# Patient Record
Sex: MALE | Race: WHITE | NOT HISPANIC OR LATINO
[De-identification: names, ages, dates, MRNs, and addresses within clinical notes are randomized per-mention and may not be internally consistent; named-entity substitution may affect disease eponyms.]

---

## 2017-12-21 ENCOUNTER — APPOINTMENT (OUTPATIENT)
Dept: PULMONOLOGY | Facility: CLINIC | Age: 67
End: 2017-12-21

## 2019-10-16 ENCOUNTER — TRANSCRIPTION ENCOUNTER (OUTPATIENT)
Age: 69
End: 2019-10-16

## 2019-10-17 ENCOUNTER — OUTPATIENT (OUTPATIENT)
Dept: OUTPATIENT SERVICES | Facility: HOSPITAL | Age: 69
LOS: 1 days | End: 2019-10-17
Payer: MEDICARE

## 2019-10-17 ENCOUNTER — RESULT REVIEW (OUTPATIENT)
Age: 69
End: 2019-10-17

## 2019-10-17 VITALS
WEIGHT: 126.1 LBS | SYSTOLIC BLOOD PRESSURE: 137 MMHG | DIASTOLIC BLOOD PRESSURE: 64 MMHG | HEART RATE: 67 BPM | HEIGHT: 63.75 IN | TEMPERATURE: 98 F | OXYGEN SATURATION: 98 % | RESPIRATION RATE: 18 BRPM

## 2019-10-17 VITALS
OXYGEN SATURATION: 96 % | RESPIRATION RATE: 19 BRPM | SYSTOLIC BLOOD PRESSURE: 132 MMHG | DIASTOLIC BLOOD PRESSURE: 78 MMHG | HEART RATE: 87 BPM

## 2019-10-17 DIAGNOSIS — Z98.41 CATARACT EXTRACTION STATUS, RIGHT EYE: Chronic | ICD-10-CM

## 2019-10-17 DIAGNOSIS — H18.20 UNSPECIFIED CORNEAL EDEMA: ICD-10-CM

## 2019-10-17 DIAGNOSIS — T85.22XA DISPLACEMENT OF INTRAOCULAR LENS, INITIAL ENCOUNTER: ICD-10-CM

## 2019-10-17 DIAGNOSIS — Z90.5 ACQUIRED ABSENCE OF KIDNEY: Chronic | ICD-10-CM

## 2019-10-17 PROCEDURE — C1889: CPT

## 2019-10-17 PROCEDURE — 88300 SURGICAL PATH GROSS: CPT

## 2019-10-17 PROCEDURE — 88300 SURGICAL PATH GROSS: CPT | Mod: 26

## 2019-10-17 PROCEDURE — 66986 EXCHANGE LENS PROSTHESIS: CPT | Mod: LT

## 2019-10-17 PROCEDURE — 67039 LASER TREATMENT OF RETINA: CPT | Mod: LT

## 2019-10-17 PROCEDURE — V2632: CPT

## 2019-10-17 NOTE — ASU DISCHARGE PLAN (ADULT/PEDIATRIC) - PATIENT EDUCATION MATERIALS PROVIED
Intraocular lens implant (IOL), Gustavo gas bubble card, Eye shield instructions and eye kit given to patient/Implant card (specify)/Other (specify)

## 2019-10-17 NOTE — ASU DISCHARGE PLAN (ADULT/PEDIATRIC) - NURSING INSTRUCTIONS
Do not rub the eye. Tylenol or extra strength tylenol if needed for discomfort, avoid   Advil, Motrin, Aleve and aspirin to minimize bleeding.  Appointment with Dr. Calero on 10/18/19 @ 11:15am

## 2019-10-17 NOTE — ASU DISCHARGE PLAN (ADULT/PEDIATRIC) - PROCEDURE
left eye pars plana vitrectomy, removal of dislocated intraocular lens with new insertion of IOL scleral sutured, endolaser left eye pars plana vitrectomy, removal of dislocated intraocular lens with new insertion of IOL scleral sutured, SF6 gas,  endolaser

## 2019-10-17 NOTE — ASU DISCHARGE PLAN (ADULT/PEDIATRIC) - CARE PROVIDER_API CALL
Camilo Virgen)  Ophthalmology  [address] [place] [address], [facility] [address]  [place] [address]  Phone: [phone]  Fax: [phone]  Follow Up Time:

## 2019-10-17 NOTE — ASU DISCHARGE PLAN (ADULT/PEDIATRIC) - CALL YOUR DOCTOR IF YOU HAVE ANY OF THE FOLLOWING:
Pain not relieved by Medications/Nausea and vomiting that does not stop/Bleeding that does not stop/Fever greater than (need to indicate Fahrenheit or Celsius)/Wound/Surgical Site with redness, or foul smelling discharge or pus/Swelling that gets worse Bleeding that does not stop/Nausea and vomiting that does not stop/Swelling that gets worse/Fever greater than (need to indicate Fahrenheit or Celsius)/Pain not relieved by Medications

## 2019-12-05 PROBLEM — I10 ESSENTIAL (PRIMARY) HYPERTENSION: Chronic | Status: ACTIVE | Noted: 2019-10-17

## 2019-12-16 ENCOUNTER — APPOINTMENT (OUTPATIENT)
Dept: UROLOGY | Facility: CLINIC | Age: 69
End: 2019-12-16
Payer: MEDICARE

## 2019-12-16 VITALS
BODY MASS INDEX: 22.53 KG/M2 | WEIGHT: 132 LBS | DIASTOLIC BLOOD PRESSURE: 67 MMHG | SYSTOLIC BLOOD PRESSURE: 182 MMHG | HEART RATE: 81 BPM | RESPIRATION RATE: 16 BRPM | TEMPERATURE: 98.3 F | HEIGHT: 64 IN

## 2019-12-16 DIAGNOSIS — Z80.1 FAMILY HISTORY OF MALIGNANT NEOPLASM OF TRACHEA, BRONCHUS AND LUNG: ICD-10-CM

## 2019-12-16 DIAGNOSIS — Z83.3 FAMILY HISTORY OF DIABETES MELLITUS: ICD-10-CM

## 2019-12-16 DIAGNOSIS — Z72.0 TOBACCO USE: ICD-10-CM

## 2019-12-16 PROCEDURE — 99204 OFFICE O/P NEW MOD 45 MIN: CPT

## 2019-12-16 RX ORDER — AMLODIPINE BESYLATE 5 MG/1
5 TABLET ORAL
Refills: 0 | Status: ACTIVE | COMMUNITY

## 2019-12-16 NOTE — ASSESSMENT
[FreeTextEntry1] : \par \par Impression/plan: 70 yo gentleman with PMH bladder cancer s/p right NU, followed by recent TURBT with left stent placement for hydroureteronephrosis. The patient had a UDS study which demonstrated reduced bladder capacity with elevated voiding pressures up to 100 cm H2O, c/w MARIANO. He is here today for a second opinion regarding UDS study and discuss management options.\par \par 1. Hydro likely secondary to MARIANO. To confirm, can have MAg-3 with stent out to ensure upper tract not obstructed. If so, can have MARIANO procedure. Will confer with Dr. Smith re: outlet procedure.

## 2019-12-16 NOTE — HISTORY OF PRESENT ILLNESS
[FreeTextEntry1] : 70 yo gentleman with PMH bladder cancer s/p right NU, followed by recent TURBT with left stent placement for hydroureteronephrosis. The patient had a UDS study which demonstrated reduced bladder capacity with elevated voiding pressures up to 100 cm H2O, c/w MARIANO. He is here today for a second opinion regarding UDS study and discuss management options. He is currently on finasteride, no sig obstructive voiding symptoms. Creat 1.9, which is stable since NU 2 years ago.

## 2019-12-16 NOTE — REVIEW OF SYSTEMS
[Negative] : Heme/Lymph [Blood in urine that you can see] : blood visible in urine [Told you have blood in urine on a urine test] : told blood was present in a urine test [Discharge from urine canal] : discharge from urine canal [Wake up at night to urinate  How many times?  ___] : wakes up to urinate [unfilled] times during the night [Leakage of urine with straining, coughing, laughing] : leakage of urine with straining, coughing, laughing [FreeTextEntry3] : frequent urination

## 2019-12-16 NOTE — PHYSICAL EXAM
[General Appearance - Well Nourished] : well nourished [General Appearance - Well Developed] : well developed [Normal Appearance] : normal appearance [Well Groomed] : well groomed [General Appearance - In No Acute Distress] : no acute distress [Edema] : no peripheral edema [Respiration, Rhythm And Depth] : normal respiratory rhythm and effort [Exaggerated Use Of Accessory Muscles For Inspiration] : no accessory muscle use [Abdomen Soft] : soft [Abdomen Tenderness] : non-tender [Normal Station and Gait] : the gait and station were normal for the patient's age [Costovertebral Angle Tenderness] : no ~M costovertebral angle tenderness [No Focal Deficits] : no focal deficits [] : no rash [Oriented To Time, Place, And Person] : oriented to person, place, and time

## 2020-01-23 ENCOUNTER — APPOINTMENT (OUTPATIENT)
Dept: UROLOGY | Facility: CLINIC | Age: 70
End: 2020-01-23
Payer: MEDICARE

## 2020-01-23 VITALS
DIASTOLIC BLOOD PRESSURE: 69 MMHG | HEART RATE: 76 BPM | HEIGHT: 64 IN | RESPIRATION RATE: 16 BRPM | SYSTOLIC BLOOD PRESSURE: 175 MMHG | BODY MASS INDEX: 22.53 KG/M2 | TEMPERATURE: 98.7 F | WEIGHT: 132 LBS

## 2020-01-23 PROCEDURE — 99213 OFFICE O/P EST LOW 20 MIN: CPT

## 2020-01-23 NOTE — PHYSICAL EXAM
[General Appearance - Well Developed] : well developed [General Appearance - Well Nourished] : well nourished [Normal Appearance] : normal appearance [Well Groomed] : well groomed [General Appearance - In No Acute Distress] : no acute distress [Edema] : no peripheral edema [Respiration, Rhythm And Depth] : normal respiratory rhythm and effort [] : no respiratory distress [Exaggerated Use Of Accessory Muscles For Inspiration] : no accessory muscle use [Oriented To Time, Place, And Person] : oriented to person, place, and time [Normal Station and Gait] : the gait and station were normal for the patient's age

## 2020-01-23 NOTE — ASSESSMENT
[FreeTextEntry1] : \par \par Impression/plan: 68 yo gentleman with PMH bladder cancer s/p right NU, followed by recent TURBT with left stent placement for hydroureteronephrosis (only retrograde performed). The patient had a UDS study which demonstrated reduced bladder capacity with elevated voiding pressures up to 100 cm H2O, c/w MARIANO. He is currently on finasteride 5 mg daily. Creat 1.9, which is stable since NU 2 years ago. The plan was for stent removal, but after discussion with Dr. Smith, decision is made for outlet procedure, URS to evaluate ureter and stent removal followed by nuclear study to r/o ureteral obstruction. \par \par 1. Referral to Dr. Smith for discussion of outlet procedure, left URS and eval for ureteral obstruction.

## 2020-01-23 NOTE — HISTORY OF PRESENT ILLNESS
[FreeTextEntry1] : 68 yo gentleman with PMH bladder cancer s/p right NU, followed by recent TURBT with left stent placement for hydroureteronephrosis (only retrograde performed). The patient had a UDS study which demonstrated reduced bladder capacity with elevated voiding pressures up to 100 cm H2O, c/w MARIANO. He is currently on finasteride 5 mg daily. Creat 1.9, which is stable since NU 2 years ago. The plan was for stent removal, but after discussion with Dr. Smith, decision is made for outlet procedure, URS to evaluate ureter and stent removal followed by nuclear study to r/o ureteral obstruction. \par

## 2020-02-03 ENCOUNTER — APPOINTMENT (OUTPATIENT)
Dept: UROLOGY | Facility: CLINIC | Age: 70
End: 2020-02-03
Payer: MEDICARE

## 2020-02-03 PROCEDURE — 99214 OFFICE O/P EST MOD 30 MIN: CPT

## 2020-02-03 NOTE — PHYSICAL EXAM
[General Appearance - Well Developed] : well developed [General Appearance - Well Nourished] : well nourished [Normal Appearance] : normal appearance [Well Groomed] : well groomed [General Appearance - In No Acute Distress] : no acute distress [Edema] : no peripheral edema [Exaggerated Use Of Accessory Muscles For Inspiration] : no accessory muscle use [Respiration, Rhythm And Depth] : normal respiratory rhythm and effort [Abdomen Soft] : soft [Abdomen Tenderness] : non-tender [Costovertebral Angle Tenderness] : no ~M costovertebral angle tenderness [Urethral Meatus] : meatus normal [Testes Mass (___cm)] : there were no testicular masses [Scrotum] : the scrotum was normal [Urinary Bladder Findings] : the bladder was normal on palpation [No Prostate Nodules] : no prostate nodules [Normal Station and Gait] : the gait and station were normal for the patient's age [No Focal Deficits] : no focal deficits [] : no rash [Affect] : the affect was normal [Oriented To Time, Place, And Person] : oriented to person, place, and time [Not Anxious] : not anxious [Mood] : the mood was normal [No Palpable Adenopathy] : no palpable adenopathy

## 2020-02-03 NOTE — ADDENDUM
[FreeTextEntry1] : Entered by William Conde, acting as scribe for Dr. Paul Smith.\par \par The documentation recorded by the scribe accurately reflects the service I personally performed and the decisions made by me.

## 2020-02-03 NOTE — LETTER BODY
[FreeTextEntry1] : Kalin TOSCANO. Leventhal DO\par [address]\[place] [address]\par [phone]\par \par Dear Dr. Leventhal,\par \par Reason for Visit: BPH. Bladder cancer. Previous kidney cancer s/p right radical nephrectomy. Left hydronephrosis s/p stent placement.\par \par This is a 69 year-old gentleman with history of kidney cancer presenting with symptoms of BPH, bladder cancer, and left hydronephrosis s/p stent placement. Patient is here today for evaluation, accompanied by his son. Patient reports previous kidney cancer s/p right radical nephrectomy at [facility] and left hydronephrosis s/p  stent placement. He reports that he was told to replace his stent every 3 months. His recent urodynamics testing with Dr. Kumari demonstrated reduced bladder capacity with elevated voiding pressures consistent with bladder outlet obstruction. He reports previous bcg instillations for his bladder cancer. He denies any hematuria or urinary incontinence. He denies any alleviating factors. He is currently taking Flomax and reports strong uroflow. He reports no pain. All other review of systems are negative. He has no cancer in his family medical history. He has previous surgical history. Past medical history, family history and social history were inquired and were noncontributory to current condition. Patient currently smokes. I encourage the patient to stop smoking and seek smoking cessation programs. I discuss with him to potential long term complications and health effects from smoking. I gave the patient additional information including the [facility] Refer-to-Quit program. I spent over 3 minutes counseling the patient regarding smoking cessation. Medications and allergies were reviewed. He has no known allergies to medication. \par \par On examination, the patient is a healthy-appearing gentleman in no acute distress. He is alert and oriented follows commands. He has normal mood and affect. He is normocephalic. Neck is supple. Oral no thrush Respirations are unlabored. His abdomen is soft and nontender. Bladder is nonpalpable. No CVA tenderness. Neurologically he is grossly intact. No peripheral edema. Skin without gross abnormality. He has normal male external genitalia. Normal meatus. Bilateral testes are descended intrascrotally and normal to palpation. On rectal examination, there is normal sphincter tone. The prostate is clinically benign without focal induration or nodularity.\par \par His PSA is 0.98, within normal limits. His creatinine level is 1.9, which is stable.\par \par ASSESSMENT: BPH. Bladder cancer. Previous kidney cancer s/p right radical nephrectomy. Left hydronephrosis s/p stent placement.\par \par I counseled the patient on the various etiology of his symptoms. I discussed the natural history of BPH and the treatment options available. I discussed the options of conservative management with fluid in dietary restrictions, herbal therapy, medical therapy, and minimally invasive procedures.  Risk and benefits were discussed. I answered his questions. Given his bladder outlet obstruction, I recommend he continue taking his prostate medications and proceed with greenlight laser vaporization of prostate. I counseled the patient regarding the procedure. The risks and benefits were discussed. Alternatives were given. I answered the patient questions. In terms of his left hydronephrosis, I recommend he undergo left ureteroscopy and stent replacement.  I counseled the patient regarding the procedure. The risks and benefits were discussed. Alternatives were given. I answered the patient questions. The patient will take the necessary preparations for the procedures, including activated partial thromboplastin time, BMP, CBC w/ DIFF, culture, prothrombin Time and INR. I also asked that he send me his pathology report from [facility]. The patient will follow-up as directed and will contact me with any questions or concerns. Thank you for the opportunity to participate in the care of Mr. AHMADI. I will keep you updated on his progress.\par \par Plan: Left ureteroscopy and stent placement. Greenlight laser vaporization of prostate. Activated partial thromboplastin time. BMP. CBC w/ DIFF. Culture. Prothrombin Time and INR. Obtain pathology report from [facility]. Follow up as directed.

## 2020-02-04 LAB
ANION GAP SERPL CALC-SCNC: 14 MMOL/L
APPEARANCE: CLEAR
APTT BLD: 29.1 SEC
BACTERIA: NEGATIVE
BASOPHILS # BLD AUTO: 0.05 K/UL
BASOPHILS NFR BLD AUTO: 0.6 %
BILIRUBIN URINE: NEGATIVE
BLOOD URINE: ABNORMAL
BUN SERPL-MCNC: 28 MG/DL
CALCIUM SERPL-MCNC: 8.8 MG/DL
CHLORIDE SERPL-SCNC: 101 MMOL/L
CO2 SERPL-SCNC: 25 MMOL/L
COLOR: NORMAL
CREAT SERPL-MCNC: 1.98 MG/DL
EOSINOPHIL # BLD AUTO: 0.25 K/UL
EOSINOPHIL NFR BLD AUTO: 3 %
GLUCOSE QUALITATIVE U: NEGATIVE
GLUCOSE SERPL-MCNC: 90 MG/DL
HCT VFR BLD CALC: 40.4 %
HGB BLD-MCNC: 12.4 G/DL
HYALINE CASTS: 0 /LPF
IMM GRANULOCYTES NFR BLD AUTO: 0.1 %
INR PPP: 0.97 RATIO
KETONES URINE: NEGATIVE
LEUKOCYTE ESTERASE URINE: NEGATIVE
LYMPHOCYTES # BLD AUTO: 1.81 K/UL
LYMPHOCYTES NFR BLD AUTO: 21.6 %
MAN DIFF?: NORMAL
MCHC RBC-ENTMCNC: 28.9 PG
MCHC RBC-ENTMCNC: 30.7 GM/DL
MCV RBC AUTO: 94.2 FL
MICROSCOPIC-UA: NORMAL
MONOCYTES # BLD AUTO: 0.77 K/UL
MONOCYTES NFR BLD AUTO: 9.2 %
NEUTROPHILS # BLD AUTO: 5.48 K/UL
NEUTROPHILS NFR BLD AUTO: 65.5 %
NITRITE URINE: NEGATIVE
PH URINE: 6
PLATELET # BLD AUTO: 294 K/UL
POTASSIUM SERPL-SCNC: 5 MMOL/L
PROTEIN URINE: NORMAL
PSA FREE FLD-MCNC: 23 %
PSA FREE SERPL-MCNC: 0.23 NG/ML
PSA SERPL-MCNC: 1.01 NG/ML
PT BLD: 10.9 SEC
RBC # BLD: 4.29 M/UL
RBC # FLD: 13.2 %
RED BLOOD CELLS URINE: 23 /HPF
SODIUM SERPL-SCNC: 140 MMOL/L
SPECIFIC GRAVITY URINE: 1.01
SQUAMOUS EPITHELIAL CELLS: 0 /HPF
UROBILINOGEN URINE: NORMAL
WBC # FLD AUTO: 8.37 K/UL
WHITE BLOOD CELLS URINE: 2 /HPF

## 2020-02-05 LAB — BACTERIA UR CULT: NORMAL

## 2020-02-19 ENCOUNTER — OUTPATIENT (OUTPATIENT)
Dept: OUTPATIENT SERVICES | Facility: HOSPITAL | Age: 70
LOS: 1 days | End: 2020-02-19
Payer: MEDICARE

## 2020-02-19 VITALS
HEART RATE: 87 BPM | TEMPERATURE: 98 F | OXYGEN SATURATION: 97 % | DIASTOLIC BLOOD PRESSURE: 79 MMHG | SYSTOLIC BLOOD PRESSURE: 155 MMHG | RESPIRATION RATE: 15 BRPM | HEIGHT: 63 IN | WEIGHT: 128.09 LBS

## 2020-02-19 DIAGNOSIS — N40.1 BENIGN PROSTATIC HYPERPLASIA WITH LOWER URINARY TRACT SYMPTOMS: ICD-10-CM

## 2020-02-19 DIAGNOSIS — Z01.818 ENCOUNTER FOR OTHER PREPROCEDURAL EXAMINATION: ICD-10-CM

## 2020-02-19 DIAGNOSIS — N13.30 UNSPECIFIED HYDRONEPHROSIS: ICD-10-CM

## 2020-02-19 DIAGNOSIS — C67.9 MALIGNANT NEOPLASM OF BLADDER, UNSPECIFIED: ICD-10-CM

## 2020-02-19 DIAGNOSIS — Z98.41 CATARACT EXTRACTION STATUS, RIGHT EYE: Chronic | ICD-10-CM

## 2020-02-19 DIAGNOSIS — Z90.5 ACQUIRED ABSENCE OF KIDNEY: Chronic | ICD-10-CM

## 2020-02-19 DIAGNOSIS — Z29.9 ENCOUNTER FOR PROPHYLACTIC MEASURES, UNSPECIFIED: ICD-10-CM

## 2020-02-19 LAB
BLD GP AB SCN SERPL QL: NEGATIVE — SIGNIFICANT CHANGE UP
RH IG SCN BLD-IMP: POSITIVE — SIGNIFICANT CHANGE UP

## 2020-02-19 RX ORDER — CEFAZOLIN SODIUM 1 G
2000 VIAL (EA) INJECTION ONCE
Refills: 0 | Status: DISCONTINUED | OUTPATIENT
Start: 2020-02-26 | End: 2020-03-12

## 2020-02-19 NOTE — H&P PST ADULT - EYES COMMENTS
Left upper lid drooping over eye, cloudy film to eye, unable to assess pupil reaction to light, R pupil reactive

## 2020-02-19 NOTE — H&P PST ADULT - NEUROLOGICAL DETAILS
alert and oriented x 3/sensation intact/responds to verbal commands/normal strength/responds to pain

## 2020-02-19 NOTE — H&P PST ADULT - ATTENDING COMMENTS
70 year old gentleman with BPH and bladder cancer with persistent urinary symptoms and left hydronephrosis despite medical therapy. Patient wishes to proceed with Greenlight laser vaporization of prostate possible transurethral resection of prostate and left ureteroscopy/stent placement  to treat his condition. Alternatives were given. Risks including but not limited to bleeding, need for blood transfusion, infection, risk of anesthesia, pain, failure to cure, persistent urinary symptoms, bladder neck contractures, erectile dysfunction, urinary incontinence, need for future procedure, and injury to surrounding structures were discussed. Patient wishes to proceed with procedure.

## 2020-02-19 NOTE — H&P PST ADULT - NSICDXPROBLEM_GEN_ALL_CORE_FT
PROBLEM DIAGNOSES  Problem: Enlarged prostate with lower urinary tract symptoms (LUTS)  Assessment and Plan: Scheduled for Greenlight Laser Vaporization of Prostate Left ureteroscopy  Preop instructions given.  Labs, incl urine cx and medical eval in chart.  T&S drawn and sent to lab  Recommend nicotine patch  Smoking cessation education    Problem: Prophylactic measure  Assessment and Plan: The Caprini score indicates this patient is at risk for a VTE event (score 3-5).  Most surgical patients in this group would benefit from pharmacologic prophylaxis.  The surgical team will determine the balance between VTE risk and bleeding risk

## 2020-02-19 NOTE — H&P PST ADULT - OPHTHALMOLOGIC COMMENTS
Outer membrane of left eye needs to be operated on, s/p 2 cataract surgeries, left eye droop upper lid, cloudy vision in left eye

## 2020-02-19 NOTE — H&P PST ADULT - HISTORY OF PRESENT ILLNESS
Mr. Delgado is a 70 year old man with PMH HTN, everyday smoker, prediabetes, bladder cancer s/p multiple surgeries, s/p right nephrectomy and enlarged prostate with LUTS now scheduled for Greenlight Vaporization of prostate left ureteroscopy.

## 2020-02-19 NOTE — H&P PST ADULT - NSANTHOSAYNRD_GEN_A_CORE
No. MILAN screening performed.  STOP BANG Legend: 0-2 = LOW Risk; 3-4 = INTERMEDIATE Risk; 5-8 = HIGH Risk

## 2020-02-19 NOTE — H&P PST ADULT - NEGATIVE ALLERGY TYPES
no reactions to medicines/no reactions to animals/no reactions to food/no outdoor environmental allergies/no indoor environmental allergies

## 2020-02-19 NOTE — H&P PST ADULT - NSICDXPASTMEDICALHX_GEN_ALL_CORE_FT
PAST MEDICAL HISTORY:  Bladder cancer     Current every day smoker     Enlarged prostate     HTN (hypertension)     Prediabetes     Ureter cancer, right

## 2020-02-19 NOTE — H&P PST ADULT - ASSESSMENT
CAPRINI SCORE [CLOT]    AGE RELATED RISK FACTORS                                                       MOBILITY RELATED FACTORS  [ ] Age 41-60 years                                            (1 Point)                  [ ] Bed rest                                                        (1 Point)  [x ] Age: 61-74 years                                           (2 Points)                 [ ] Plaster cast                                                   (2 Points)  [ ] Age= 75 years                                              (3 Points)                 [ ] Bed bound for more than 72 hours                 (2 Points)    DISEASE RELATED RISK FACTORS                                               GENDER SPECIFIC FACTORS  [ ] Edema in the lower extremities                       (1 Point)                  [ ] Pregnancy                                                     (1 Point)  [ ] Varicose veins                                               (1 Point)                  [ ] Post-partum < 6 weeks                                   (1 Point)             [ ] BMI > 25 Kg/m2                                            (1 Point)                  [ ] Hormonal therapy  or oral contraception          (1 Point)                 [ ] Sepsis (in the previous month)                        (1 Point)                  [ ] History of pregnancy complications                 (1 point)  [ ] Pneumonia or serious lung disease                                               [ ] Unexplained or recurrent                     (1 Point)           (in the previous month)                               (1 Point)  [ ] Abnormal pulmonary function test                     (1 Point)                 SURGERY RELATED RISK FACTORS  [ ] Acute myocardial infarction                              (1 Point)                 [ ]  Section                                             (1 Point)  [ ] Congestive heart failure (in the previous month)  (1 Point)               [ ] Minor surgery                                                  (1 Point)   [ ] Inflammatory bowel disease                             (1 Point)                 [ ] Arthroscopic surgery                                        (2 Points)  [ ] Central venous access                                      (2 Points)                [ x] General surgery lasting more than 45 minutes   (2 Points)       [ ] Stroke (in the previous month)                          (5 Points)               [ ] Elective arthroplasty                                         (5 Points)                                                                                                                                               HEMATOLOGY RELATED FACTORS                                                 TRAUMA RELATED RISK FACTORS  [ ] Prior episodes of VTE                                     (3 Points)                 [ ] Fracture of the hip, pelvis, or leg                       (5 Points)  [ ] Positive family history for VTE                         (3 Points)                 [ ] Acute spinal cord injury (in the previous month)  (5 Points)  [ ] Prothrombin 50466 A                                     (3 Points)                 [ ] Paralysis  (less than 1 month)                             (5 Points)  [ ] Factor V Leiden                                             (3 Points)                  [ ] Multiple Trauma within 1 month                        (5 Points)  [ ] Lupus anticoagulants                                     (3 Points)                                                           [ ] Anticardiolipin antibodies                               (3 Points)                                                       [ ] High homocysteine in the blood                      (3 Points)                                             [ ] Other congenital or acquired thrombophilia      (3 Points)                                                [ ] Heparin induced thrombocytopenia                  (3 Points)                                          Total Score [   4       ]

## 2020-02-19 NOTE — H&P PST ADULT - RS GEN PE MLT RESP DETAILS PC
breath sounds equal/no rhonchi/no wheezes/clear to auscultation bilaterally/respirations non-labored/good air movement/airway patent

## 2020-02-25 ENCOUNTER — TRANSCRIPTION ENCOUNTER (OUTPATIENT)
Age: 70
End: 2020-02-25

## 2020-02-26 ENCOUNTER — RESULT REVIEW (OUTPATIENT)
Age: 70
End: 2020-02-26

## 2020-02-26 ENCOUNTER — OUTPATIENT (OUTPATIENT)
Dept: OUTPATIENT SERVICES | Facility: HOSPITAL | Age: 70
LOS: 1 days | End: 2020-02-26
Payer: MEDICARE

## 2020-02-26 ENCOUNTER — APPOINTMENT (OUTPATIENT)
Dept: UROLOGY | Facility: HOSPITAL | Age: 70
End: 2020-02-26

## 2020-02-26 VITALS
DIASTOLIC BLOOD PRESSURE: 65 MMHG | RESPIRATION RATE: 16 BRPM | HEIGHT: 63 IN | SYSTOLIC BLOOD PRESSURE: 148 MMHG | WEIGHT: 128.09 LBS | HEART RATE: 74 BPM | OXYGEN SATURATION: 97 % | TEMPERATURE: 98 F

## 2020-02-26 DIAGNOSIS — N13.30 UNSPECIFIED HYDRONEPHROSIS: ICD-10-CM

## 2020-02-26 DIAGNOSIS — C67.9 MALIGNANT NEOPLASM OF BLADDER, UNSPECIFIED: ICD-10-CM

## 2020-02-26 DIAGNOSIS — Z90.5 ACQUIRED ABSENCE OF KIDNEY: Chronic | ICD-10-CM

## 2020-02-26 DIAGNOSIS — N40.1 BENIGN PROSTATIC HYPERPLASIA WITH LOWER URINARY TRACT SYMPTOMS: ICD-10-CM

## 2020-02-26 DIAGNOSIS — Z98.41 CATARACT EXTRACTION STATUS, RIGHT EYE: Chronic | ICD-10-CM

## 2020-02-26 LAB — RH IG SCN BLD-IMP: POSITIVE — SIGNIFICANT CHANGE UP

## 2020-02-26 PROCEDURE — 76000 FLUOROSCOPY <1 HR PHYS/QHP: CPT

## 2020-02-26 PROCEDURE — 52648 LASER SURGERY OF PROSTATE: CPT

## 2020-02-26 PROCEDURE — 86850 RBC ANTIBODY SCREEN: CPT

## 2020-02-26 PROCEDURE — G0463: CPT

## 2020-02-26 PROCEDURE — 86900 BLOOD TYPING SEROLOGIC ABO: CPT

## 2020-02-26 PROCEDURE — 88112 CYTOPATH CELL ENHANCE TECH: CPT | Mod: 26

## 2020-02-26 PROCEDURE — 52351 CYSTOURETERO & OR PYELOSCOPE: CPT

## 2020-02-26 PROCEDURE — 74420 UROGRAPHY RTRGR +-KUB: CPT | Mod: 26

## 2020-02-26 PROCEDURE — 88305 TISSUE EXAM BY PATHOLOGIST: CPT | Mod: 26

## 2020-02-26 PROCEDURE — 86901 BLOOD TYPING SEROLOGIC RH(D): CPT

## 2020-02-26 RX ORDER — LIDOCAINE HCL 20 MG/ML
0.2 VIAL (ML) INJECTION ONCE
Refills: 0 | Status: DISCONTINUED | OUTPATIENT
Start: 2020-02-26 | End: 2020-02-26

## 2020-02-26 RX ORDER — SODIUM CHLORIDE 9 MG/ML
1000 INJECTION, SOLUTION INTRAVENOUS
Refills: 0 | Status: DISCONTINUED | OUTPATIENT
Start: 2020-02-26 | End: 2020-03-12

## 2020-02-26 RX ORDER — CEPHALEXIN 500 MG
1 CAPSULE ORAL
Qty: 6 | Refills: 0
Start: 2020-02-26 | End: 2020-02-28

## 2020-02-26 RX ORDER — ONDANSETRON 8 MG/1
4 TABLET, FILM COATED ORAL ONCE
Refills: 0 | Status: DISCONTINUED | OUTPATIENT
Start: 2020-02-26 | End: 2020-02-27

## 2020-02-26 RX ORDER — SODIUM CHLORIDE 9 MG/ML
3 INJECTION INTRAMUSCULAR; INTRAVENOUS; SUBCUTANEOUS EVERY 8 HOURS
Refills: 0 | Status: DISCONTINUED | OUTPATIENT
Start: 2020-02-26 | End: 2020-02-26

## 2020-02-26 RX ORDER — HYDROMORPHONE HYDROCHLORIDE 2 MG/ML
0.25 INJECTION INTRAMUSCULAR; INTRAVENOUS; SUBCUTANEOUS
Refills: 0 | Status: DISCONTINUED | OUTPATIENT
Start: 2020-02-26 | End: 2020-02-27

## 2020-02-26 NOTE — ASU DISCHARGE PLAN (ADULT/PEDIATRIC) - CARE PROVIDER_API CALL
Paul Smith)  Urology  [address] [place] [address], [place] [address]  Phone: [phone]  Fax: [phone]  Follow Up Time:

## 2020-02-26 NOTE — BRIEF OPERATIVE NOTE - NSICDXBRIEFPOSTOP_GEN_ALL_CORE_FT
POST-OP DIAGNOSIS:  Hydronephrosis, left 26-Feb-2020 16:46:00  Juan Carlos Rolon  BPH with urinary obstruction 26-Feb-2020 16:45:39  Juan Carlos Rolon

## 2020-02-26 NOTE — BRIEF OPERATIVE NOTE - NSICDXBRIEFPREOP_GEN_ALL_CORE_FT
PRE-OP DIAGNOSIS:  Hydronephrosis, left 26-Feb-2020 16:45:48  Juan Carlos Rolon  BPH with urinary obstruction 26-Feb-2020 16:45:29  Juan Carlos Rolon

## 2020-02-26 NOTE — BRIEF OPERATIVE NOTE - NSICDXBRIEFPROCEDURE_GEN_ALL_CORE_FT
PROCEDURES:  Left rigid ureteroscopy 26-Feb-2020 16:45:08 left ureteral stent exchange Juan Carlos Rolon  Photoselective vaporization of prostate (PVP) with GreenLight laser 26-Feb-2020 16:44:05  Juan Carlos Rolon

## 2020-02-26 NOTE — ASU DISCHARGE PLAN (ADULT/PEDIATRIC) - ASU DC SPECIAL INSTRUCTIONSFT
You may take Tylenol and Ibuprofen over the counter every 6 hours for pain.     You will be discharged with the reeves in place. Please follow up with Dr Smith tomorrow for catheter removal. Please call the office to schedule your appointment.

## 2020-02-27 ENCOUNTER — APPOINTMENT (OUTPATIENT)
Dept: UROLOGY | Facility: CLINIC | Age: 70
End: 2020-02-27

## 2020-02-27 VITALS
DIASTOLIC BLOOD PRESSURE: 79 MMHG | SYSTOLIC BLOOD PRESSURE: 167 MMHG | OXYGEN SATURATION: 99 % | TEMPERATURE: 98 F | HEART RATE: 92 BPM | RESPIRATION RATE: 18 BRPM

## 2020-02-27 PROBLEM — F17.200 NICOTINE DEPENDENCE, UNSPECIFIED, UNCOMPLICATED: Chronic | Status: ACTIVE | Noted: 2020-02-19

## 2020-02-27 PROCEDURE — C1758: CPT

## 2020-02-27 PROCEDURE — C1889: CPT

## 2020-02-27 PROCEDURE — 76000 FLUOROSCOPY <1 HR PHYS/QHP: CPT

## 2020-02-27 PROCEDURE — 86900 BLOOD TYPING SEROLOGIC ABO: CPT

## 2020-02-27 PROCEDURE — C2617: CPT

## 2020-02-27 PROCEDURE — 88112 CYTOPATH CELL ENHANCE TECH: CPT

## 2020-02-27 PROCEDURE — 88305 TISSUE EXAM BY PATHOLOGIST: CPT

## 2020-02-27 PROCEDURE — C1769: CPT

## 2020-02-27 PROCEDURE — 86901 BLOOD TYPING SEROLOGIC RH(D): CPT

## 2020-02-27 PROCEDURE — C1887: CPT

## 2020-02-27 PROCEDURE — 52648 LASER SURGERY OF PROSTATE: CPT

## 2020-02-27 PROCEDURE — 52332 CYSTOSCOPY AND TREATMENT: CPT | Mod: LT

## 2020-02-27 RX ORDER — ACETAMINOPHEN 500 MG
650 TABLET ORAL ONCE
Refills: 0 | Status: COMPLETED | OUTPATIENT
Start: 2020-02-27 | End: 2020-02-27

## 2020-02-27 RX ADMIN — SODIUM CHLORIDE 125 MILLILITER(S): 9 INJECTION, SOLUTION INTRAVENOUS at 00:18

## 2020-02-27 RX ADMIN — Medication 650 MILLIGRAM(S): at 07:00

## 2020-02-27 RX ADMIN — SODIUM CHLORIDE 125 MILLILITER(S): 9 INJECTION, SOLUTION INTRAVENOUS at 04:30

## 2020-02-27 NOTE — PROGRESS NOTE ADULT - SUBJECTIVE AND OBJECTIVE BOX
UROLOGY DAILY PROGRESS NOTE:     Subjective: Patient seen and examined at bedside. No acute events overnight      Objective:  Vital signs  T(F): , Max: 97.9 (02-27-20 @ 00:00)  HR: 92 (02-27-20 @ 04:00)  BP: 121/61 (02-27-20 @ 04:00)  SpO2: 94% (02-27-20 @ 04:00)  Wt(kg): --    Output     I&O's Detail    26 Feb 2020 07:01  -  27 Feb 2020 06:49  --------------------------------------------------------  IN:    lactated ringers.: 1375 mL    Oral Fluid: 240 mL  Total IN: 1615 mL    OUT:  Total OUT: 0 mL    Total NET: 1615 mL          Physical Exam:  Gen: NAD  Abd: soft NTND  Back: no CVAT  : CBI held and urine watermelon red translucent reeves removed     Labs:          LABS/RADIOLOGY RESULTS:        Blood Cultures                Urine Cx:

## 2020-02-27 NOTE — PROGRESS NOTE ADULT - ASSESSMENT
69 yo male BPH POD #1 greenlight TURP  CBI ran overnight  held and reeves removed  TOV  PVR  DVT prophylaxis   incentive spirometer  Dc planning keflex x 3 days

## 2020-03-02 LAB — NON-GYNECOLOGICAL CYTOLOGY STUDY: SIGNIFICANT CHANGE UP

## 2020-03-05 ENCOUNTER — APPOINTMENT (OUTPATIENT)
Dept: UROLOGY | Facility: CLINIC | Age: 70
End: 2020-03-05
Payer: MEDICARE

## 2020-03-05 LAB
ANION GAP SERPL CALC-SCNC: 16 MMOL/L
BUN SERPL-MCNC: 37 MG/DL
CALCIUM SERPL-MCNC: 8.2 MG/DL
CHLORIDE SERPL-SCNC: 100 MMOL/L
CO2 SERPL-SCNC: 23 MMOL/L
CREAT SERPL-MCNC: 2 MG/DL
GLUCOSE SERPL-MCNC: 112 MG/DL
POTASSIUM SERPL-SCNC: 5.1 MMOL/L
SODIUM SERPL-SCNC: 139 MMOL/L

## 2020-03-05 PROCEDURE — 99024 POSTOP FOLLOW-UP VISIT: CPT

## 2020-03-05 NOTE — ADDENDUM
[FreeTextEntry1] : Entered by Gee Guzman, acting as scribe for Dr. Paul Smith.\par \par The documentation recorded by the scribe accurately reflects the service I personally performed and the decisions made by me.

## 2020-03-05 NOTE — LETTER BODY
[FreeTextEntry1] : Kalin OBREGON Leventhal DO\par [address]\[place] [address]\par [phone]\par \par Dear Dr. Leventhal,\par \par Reason for Visit: BPH. Bladder cancer. Previous kidney cancer s/p right radical nephrectomy. Left hydronephrosis s/p stent placement.\par \par This is a 70 year-old gentleman with history of kidney cancer, s/p right radical nephrectomy, and low grade bladder cancer, presenting with BPH and left hydronephrosis s/p stent placement. Patient reports previous kidney cancer s/p right radical nephrectomy at [facility] and left hydronephrosis s/p stent placement. He reports that he was told to replace his stent every 3 months. His recent urodynamics testing with Dr. Kumari demonstrated reduced bladder capacity with elevated voiding pressures consistent with bladder outlet obstruction. He reports of previous BCG instillations for his bladder cancer. Patient returns today for follow up, accompanied by his son. Since his last visit, he underwent an unremarkable left ureteroscopy, laser lithotripsy, stone extraction, and stent placement and GreenLight laser vaporization of prostate 1 week ago. He reports he is recovering well following the procedures. He denies any gross hematuria or urinary incontinence. He reports of strong uroflow and improved urinary symptoms compared to prior to the surgery. He currently has two left stents in place. He has no pain currently. The past medical history, family history and social history are unchanged. All other review of systems are negative. Patient denies any changes in medications. Medication list was reconciled. \par \par On examination, the patient is an older-appearing gentleman in no acute distress. He is alert and oriented follows commands. He has normal mood and affect. He is normocephalic. Neck is supple. Oral no thrush Respirations are unlabored. His abdomen is soft and nontender. Bladder is nonpalpable. No CVA tenderness. Neurologically he is grossly intact. No peripheral edema. Skin without gross abnormality. \par \par Post-void residual on bladder scan today was  cc. \par \par ASSESSMENT: BPH. Bladder cancer. Previous kidney cancer s/p right radical nephrectomy. Left hydronephrosis s/p stent placement.\par \par I counseled the patient and his son. I reassured them. 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 The left ureter and renal collecting system were notably hydronephrotic and tortuous. Due to minimal bother of the stents, I recommend he keep the stents in place until I can be sure that his left ureter will not return to its J hook formation. He will follow up in 2 months for renal ultrasound to ensure stability. Risks and alternatives were discussed. I answered the patient questions. He will also obtain BMP, urinalysis, and urine culture today. The patient will follow-up as directed and will contact me with any questions or concerns. Thank you for the opportunity to participate in the care of Mr. AHMADI. I will keep you updated on his progress.\par \par Plan: BMP. Urinalysis. Culture. Follow up in 2 months for renal US.

## 2020-03-08 LAB
APPEARANCE: CLEAR
BACTERIA UR CULT: NORMAL
BACTERIA: NEGATIVE
BILIRUBIN URINE: NEGATIVE
BLOOD URINE: ABNORMAL
COLOR: NORMAL
GLUCOSE QUALITATIVE U: NEGATIVE
HYALINE CASTS: 1 /LPF
KETONES URINE: NEGATIVE
LEUKOCYTE ESTERASE URINE: ABNORMAL
MICROSCOPIC-UA: NORMAL
NITRITE URINE: NEGATIVE
PH URINE: 6
PROTEIN URINE: ABNORMAL
RED BLOOD CELLS URINE: 60 /HPF
SPECIFIC GRAVITY URINE: 1.02
SQUAMOUS EPITHELIAL CELLS: 4 /HPF
UROBILINOGEN URINE: NORMAL
WHITE BLOOD CELLS URINE: 35 /HPF

## 2020-05-11 ENCOUNTER — APPOINTMENT (OUTPATIENT)
Dept: UROLOGY | Facility: CLINIC | Age: 70
End: 2020-05-11
Payer: MEDICARE

## 2020-05-11 ENCOUNTER — OUTPATIENT (OUTPATIENT)
Dept: OUTPATIENT SERVICES | Facility: HOSPITAL | Age: 70
LOS: 1 days | End: 2020-05-11
Payer: MEDICARE

## 2020-05-11 DIAGNOSIS — Z98.41 CATARACT EXTRACTION STATUS, RIGHT EYE: Chronic | ICD-10-CM

## 2020-05-11 DIAGNOSIS — R35.0 FREQUENCY OF MICTURITION: ICD-10-CM

## 2020-05-11 DIAGNOSIS — Z90.5 ACQUIRED ABSENCE OF KIDNEY: Chronic | ICD-10-CM

## 2020-05-11 PROCEDURE — 76775 US EXAM ABDO BACK WALL LIM: CPT

## 2020-05-11 PROCEDURE — 76775 US EXAM ABDO BACK WALL LIM: CPT | Mod: 26

## 2020-05-11 PROCEDURE — 99213 OFFICE O/P EST LOW 20 MIN: CPT | Mod: 25

## 2020-05-11 PROCEDURE — 99024 POSTOP FOLLOW-UP VISIT: CPT

## 2020-05-11 NOTE — LETTER BODY
[FreeTextEntry1] : Keith S. Leventhal DO\par [address]\[place] [address]\par [phone]\par \par Dear Dr. Leventhal,\par \par Reason for Visit: BPH s/p Greenlight laser vaporization of prostate. Bladder cancer. Previous kidney cancer s/p right radical nephrectomy. Left hydronephrosis s/p stent placement.\par \par This is a 70 year-old gentleman with history of kidney cancer, s/p right radical nephrectomy, and low grade bladder cancer, presenting with BPH s/p greenlight laser vaporization of prostate and left hydronephrosis s/p stent placement. Patient recently underwent Greenlight laser vaporization of prostate and left ureteroscopy and stent exchange in February. Patient returns today for follow up and renal ultrasound. Since his last visit, he reports voiding well with strong uroflow following his surgery. His stents are still in place. He has no pain, hematuria, or dysuria currently. The past medical history, family history and social history are unchanged. All other review of systems are negative. Patient denies any changes in medications. Medication list was reconciled. \par \par On examination, the patient is an older-appearing gentleman in no acute distress. He is alert and oriented follows commands. He has normal mood and affect. He is normocephalic. Neck is supple. Oral no thrush Respirations are unlabored. His abdomen is soft and nontender. Bladder is nonpalpable. No CVA tenderness. Neurologically he is grossly intact. No peripheral edema. Skin without gross abnormality. \par \par Post-void residual on bladder scan today was 60 cc. \par \par I personally reviewed ultrasound images with the patient today. Images demonstrated no evidence of hydronephrosis.\par \par ASSESSMENT: BPH s/p Greenlight laser vaporization of prostate. Bladder cancer. Previous kidney cancer s/p right radical nephrectomy. Left hydronephrosis s/p stent placement.\par \par I counseled the patient. Patient's ultrasound today demonstrated no evidence of hydronephrosis. I reassured the patient and recommended he follow up in 3 months for stent exchange. In terms of his BPH, patient is voiding well following his surgery, PVR 60 cc. I recommended he obtain a BMP today to ensure stability. Patient understands that if he develops gross hematuria or any urinary discomfort, he will contact me for further evaluation. Risks and alternatives were discussed. I answered the patient questions. The patient will follow-up as directed and will contact me with any questions or concerns. Thank you for the opportunity to participate in the care of Mr. AHMADI. I will keep you updated on his progress.\par \par Plan: BMP. Follow up in 3 months for stent exchange. Health Care Proxy (HCP)

## 2020-05-12 DIAGNOSIS — N40.1 BENIGN PROSTATIC HYPERPLASIA WITH LOWER URINARY TRACT SYMPTOMS: ICD-10-CM

## 2020-05-12 DIAGNOSIS — C67.9 MALIGNANT NEOPLASM OF BLADDER, UNSPECIFIED: ICD-10-CM

## 2020-05-12 DIAGNOSIS — N13.30 UNSPECIFIED HYDRONEPHROSIS: ICD-10-CM

## 2020-05-13 LAB
ANION GAP SERPL CALC-SCNC: 13 MMOL/L
BUN SERPL-MCNC: 32 MG/DL
CALCIUM SERPL-MCNC: 8.3 MG/DL
CHLORIDE SERPL-SCNC: 100 MMOL/L
CO2 SERPL-SCNC: 28 MMOL/L
CREAT SERPL-MCNC: 1.86 MG/DL
GLUCOSE SERPL-MCNC: 104 MG/DL
POTASSIUM SERPL-SCNC: 5.1 MMOL/L
SODIUM SERPL-SCNC: 141 MMOL/L

## 2020-08-10 ENCOUNTER — APPOINTMENT (OUTPATIENT)
Dept: UROLOGY | Facility: CLINIC | Age: 70
End: 2020-08-10
Payer: MEDICARE

## 2020-08-10 VITALS — TEMPERATURE: 98.3 F

## 2020-08-10 PROCEDURE — 99214 OFFICE O/P EST MOD 30 MIN: CPT

## 2020-08-10 NOTE — LETTER BODY
[FreeTextEntry1] : Keith S. Leventhal DO\par [address]\[place] [address]\par [phone]\par \par Dear Dr. Leventhal,\par \par Reason for Visit: BPH s/p Greenlight laser vaporization of prostate. Bladder cancer. Previous kidney cancer s/p right radical nephrectomy. Left hydronephrosis s/p stent placement.\par \par This is a 70 year-old gentleman with history of kidney cancer, s/p right radical nephrectomy, and low grade bladder cancer, presenting with BPH s/p greenlight laser vaporization of prostate and chronic left hydronephrosis s/p stent placement. Patient underwent Greenlight laser vaporization of prostate and left ureteroscopy and stent exchange in February. Patient returns today for follow up. Since he was last seen, patient has been voiding well without difficulty. He has strong uroflow. He denies any urinary complaints. He is due for stent exchange. The past medical history, family history and social history are unchanged. All other review of systems are negative. Patient denies any changes in medications. Medication list was reconciled. \par \par On examination, the patient is an older-appearing gentleman in no acute distress. He is alert and oriented follows commands. He has normal mood and affect. He is normocephalic. Neck is supple. Oral no thrush Respirations are unlabored. His abdomen is soft and nontender. Bladder is nonpalpable. No CVA tenderness. Neurologically he is grossly intact. No peripheral edema. Skin without gross abnormality. \par \par ASSESSMENT: BPH s/p Greenlight laser vaporization of prostate. Bladder cancer. Previous kidney cancer s/p right radical nephrectomy. Left hydronephrosis s/p stent placement.\par \par I counseled the patient. In terms of his left ureteral obstruction, patient will proceed with stent exchange and cystoscopy for evaluation of his left ureteral stricture.  I counseled the patient regarding the procedure. The risks and benefits were discussed. Alternatives were given. I answered the patient questions. The patient will take the necessary preparations for the procedure, including activated partial thromboplastin time, BMP, CBC w/ DIFF, culture, prothrombin Time and INR. In terms of his BPH, patient is voiding without any difficulties. I recommend he continue taking Proscar regularly as directed. Risks and alternatives were discussed. I answered the patient questions. The patient will follow-up as directed and will contact me with any questions or concerns. Thank you for the opportunity to participate in the care of Mr. AHMADI. I will keep you updated on his progress.\par \par Plan: Continue Proscar. Stent exchange and cystoscopy. Pre-operative labs. Follow up as directed.

## 2020-08-15 LAB
ANION GAP SERPL CALC-SCNC: 14 MMOL/L
APTT BLD: 29 SEC
BACTERIA UR CULT: NORMAL
BASOPHILS # BLD AUTO: 0.05 K/UL
BASOPHILS NFR BLD AUTO: 0.7 %
BUN SERPL-MCNC: 38 MG/DL
CALCIUM SERPL-MCNC: 8.3 MG/DL
CHLORIDE SERPL-SCNC: 100 MMOL/L
CO2 SERPL-SCNC: 25 MMOL/L
CREAT SERPL-MCNC: 1.9 MG/DL
EOSINOPHIL # BLD AUTO: 0.27 K/UL
EOSINOPHIL NFR BLD AUTO: 3.5 %
GLUCOSE SERPL-MCNC: 135 MG/DL
HCT VFR BLD CALC: 41 %
HGB BLD-MCNC: 12.9 G/DL
IMM GRANULOCYTES NFR BLD AUTO: 0.1 %
INR PPP: 1.01 RATIO
LYMPHOCYTES # BLD AUTO: 1.62 K/UL
LYMPHOCYTES NFR BLD AUTO: 21.1 %
MAN DIFF?: NORMAL
MCHC RBC-ENTMCNC: 29.1 PG
MCHC RBC-ENTMCNC: 31.5 GM/DL
MCV RBC AUTO: 92.6 FL
MONOCYTES # BLD AUTO: 0.75 K/UL
MONOCYTES NFR BLD AUTO: 9.8 %
NEUTROPHILS # BLD AUTO: 4.98 K/UL
NEUTROPHILS NFR BLD AUTO: 64.8 %
PLATELET # BLD AUTO: 283 K/UL
POTASSIUM SERPL-SCNC: 4.5 MMOL/L
PSA FREE FLD-MCNC: 19 %
PSA FREE SERPL-MCNC: 0.09 NG/ML
PSA SERPL-MCNC: 0.46 NG/ML
PT BLD: 11.9 SEC
RBC # BLD: 4.43 M/UL
RBC # FLD: 12.8 %
SODIUM SERPL-SCNC: 139 MMOL/L
URINE CYTOLOGY: NORMAL
WBC # FLD AUTO: 7.68 K/UL

## 2020-08-16 ENCOUNTER — OUTPATIENT (OUTPATIENT)
Dept: OUTPATIENT SERVICES | Facility: HOSPITAL | Age: 70
LOS: 1 days | End: 2020-08-16
Payer: MEDICARE

## 2020-08-16 DIAGNOSIS — Z90.5 ACQUIRED ABSENCE OF KIDNEY: Chronic | ICD-10-CM

## 2020-08-16 DIAGNOSIS — Z11.59 ENCOUNTER FOR SCREENING FOR OTHER VIRAL DISEASES: ICD-10-CM

## 2020-08-16 DIAGNOSIS — Z98.41 CATARACT EXTRACTION STATUS, RIGHT EYE: Chronic | ICD-10-CM

## 2020-08-16 LAB — SARS-COV-2 RNA SPEC QL NAA+PROBE: SIGNIFICANT CHANGE UP

## 2020-08-16 PROCEDURE — U0003: CPT

## 2020-08-17 ENCOUNTER — TRANSCRIPTION ENCOUNTER (OUTPATIENT)
Age: 70
End: 2020-08-17

## 2020-08-18 ENCOUNTER — RESULT REVIEW (OUTPATIENT)
Age: 70
End: 2020-08-18

## 2020-08-18 ENCOUNTER — OUTPATIENT (OUTPATIENT)
Dept: OUTPATIENT SERVICES | Facility: HOSPITAL | Age: 70
LOS: 1 days | End: 2020-08-18
Payer: MEDICARE

## 2020-08-18 VITALS
RESPIRATION RATE: 19 BRPM | TEMPERATURE: 98 F | WEIGHT: 130.95 LBS | HEART RATE: 73 BPM | SYSTOLIC BLOOD PRESSURE: 133 MMHG | OXYGEN SATURATION: 98 % | DIASTOLIC BLOOD PRESSURE: 60 MMHG | HEIGHT: 63.5 IN

## 2020-08-18 VITALS
SYSTOLIC BLOOD PRESSURE: 153 MMHG | DIASTOLIC BLOOD PRESSURE: 69 MMHG | HEART RATE: 86 BPM | OXYGEN SATURATION: 99 % | RESPIRATION RATE: 14 BRPM

## 2020-08-18 DIAGNOSIS — H18.20 UNSPECIFIED CORNEAL EDEMA: ICD-10-CM

## 2020-08-18 DIAGNOSIS — Z98.41 CATARACT EXTRACTION STATUS, RIGHT EYE: Chronic | ICD-10-CM

## 2020-08-18 DIAGNOSIS — Z90.5 ACQUIRED ABSENCE OF KIDNEY: Chronic | ICD-10-CM

## 2020-08-18 PROCEDURE — 88312 SPECIAL STAINS GROUP 1: CPT

## 2020-08-18 PROCEDURE — 65756 CORNEAL TRNSPL ENDOTHELIAL: CPT | Mod: AS

## 2020-08-18 PROCEDURE — 87075 CULTR BACTERIA EXCEPT BLOOD: CPT

## 2020-08-18 PROCEDURE — 65756 CORNEAL TRNSPL ENDOTHELIAL: CPT | Mod: LT

## 2020-08-18 PROCEDURE — 88304 TISSUE EXAM BY PATHOLOGIST: CPT

## 2020-08-18 PROCEDURE — 88112 CYTOPATH CELL ENHANCE TECH: CPT | Mod: 26

## 2020-08-18 PROCEDURE — V2785: CPT

## 2020-08-18 PROCEDURE — 88304 TISSUE EXAM BY PATHOLOGIST: CPT | Mod: 26

## 2020-08-18 PROCEDURE — 87070 CULTURE OTHR SPECIMN AEROBIC: CPT

## 2020-08-18 PROCEDURE — 88312 SPECIAL STAINS GROUP 1: CPT | Mod: 26

## 2020-08-18 NOTE — ASU DISCHARGE PLAN (ADULT/PEDIATRIC) - CALL YOUR DOCTOR IF YOU HAVE ANY OF THE FOLLOWING:
Wound/Surgical Site with redness, or foul smelling discharge or pus/Swelling that gets worse/Nausea and vomiting that does not stop/Bleeding that does not stop/Pain not relieved by Medications/Fever greater than (need to indicate Fahrenheit or Celsius)

## 2020-08-18 NOTE — ASU DISCHARGE PLAN (ADULT/PEDIATRIC) - CARE PROVIDER_API CALL
Kristie Langford Y  OPHTHALMOLOGY  [address] [place], [place] [address]  Phone: [phone]  Fax: [phone]  Scheduled Appointment: 08/19/2020 12:30 PM

## 2020-08-19 LAB
GRAM STN FLD: SIGNIFICANT CHANGE UP
SPECIMEN SOURCE: SIGNIFICANT CHANGE UP

## 2020-09-08 LAB
CULTURE RESULTS: SIGNIFICANT CHANGE UP
SPECIMEN SOURCE: SIGNIFICANT CHANGE UP

## 2020-11-03 ENCOUNTER — OUTPATIENT (OUTPATIENT)
Dept: OUTPATIENT SERVICES | Facility: HOSPITAL | Age: 70
LOS: 1 days | End: 2020-11-03
Payer: MEDICARE

## 2020-11-03 VITALS
HEIGHT: 64 IN | TEMPERATURE: 97 F | RESPIRATION RATE: 20 BRPM | WEIGHT: 134.92 LBS | OXYGEN SATURATION: 96 % | SYSTOLIC BLOOD PRESSURE: 131 MMHG | HEART RATE: 69 BPM | DIASTOLIC BLOOD PRESSURE: 64 MMHG

## 2020-11-03 DIAGNOSIS — N13.30 UNSPECIFIED HYDRONEPHROSIS: ICD-10-CM

## 2020-11-03 DIAGNOSIS — Z01.818 ENCOUNTER FOR OTHER PREPROCEDURAL EXAMINATION: ICD-10-CM

## 2020-11-03 DIAGNOSIS — C67.9 MALIGNANT NEOPLASM OF BLADDER, UNSPECIFIED: ICD-10-CM

## 2020-11-03 DIAGNOSIS — N40.1 BENIGN PROSTATIC HYPERPLASIA WITH LOWER URINARY TRACT SYMPTOMS: ICD-10-CM

## 2020-11-03 DIAGNOSIS — Z90.5 ACQUIRED ABSENCE OF KIDNEY: Chronic | ICD-10-CM

## 2020-11-03 DIAGNOSIS — Z98.41 CATARACT EXTRACTION STATUS, RIGHT EYE: Chronic | ICD-10-CM

## 2020-11-03 DIAGNOSIS — Z96.0 PRESENCE OF UROGENITAL IMPLANTS: Chronic | ICD-10-CM

## 2020-11-03 LAB
A1C WITH ESTIMATED AVERAGE GLUCOSE RESULT: 6.5 % — HIGH (ref 4–5.6)
ANION GAP SERPL CALC-SCNC: 13 MMOL/L — SIGNIFICANT CHANGE UP (ref 5–17)
BUN SERPL-MCNC: 28 MG/DL — HIGH (ref 7–23)
CALCIUM SERPL-MCNC: 8 MG/DL — LOW (ref 8.4–10.5)
CHLORIDE SERPL-SCNC: 101 MMOL/L — SIGNIFICANT CHANGE UP (ref 96–108)
CO2 SERPL-SCNC: 25 MMOL/L — SIGNIFICANT CHANGE UP (ref 22–31)
CREAT SERPL-MCNC: 1.67 MG/DL — HIGH (ref 0.5–1.3)
ESTIMATED AVERAGE GLUCOSE: 140 MG/DL — HIGH (ref 68–114)
GLUCOSE SERPL-MCNC: 141 MG/DL — HIGH (ref 70–99)
HCT VFR BLD CALC: 40.5 % — SIGNIFICANT CHANGE UP (ref 39–50)
HGB BLD-MCNC: 13 G/DL — SIGNIFICANT CHANGE UP (ref 13–17)
MCHC RBC-ENTMCNC: 29.3 PG — SIGNIFICANT CHANGE UP (ref 27–34)
MCHC RBC-ENTMCNC: 32.1 GM/DL — SIGNIFICANT CHANGE UP (ref 32–36)
MCV RBC AUTO: 91.2 FL — SIGNIFICANT CHANGE UP (ref 80–100)
NRBC # BLD: 0 /100 WBCS — SIGNIFICANT CHANGE UP (ref 0–0)
PLATELET # BLD AUTO: 268 K/UL — SIGNIFICANT CHANGE UP (ref 150–400)
POTASSIUM SERPL-MCNC: 4.3 MMOL/L — SIGNIFICANT CHANGE UP (ref 3.5–5.3)
POTASSIUM SERPL-SCNC: 4.3 MMOL/L — SIGNIFICANT CHANGE UP (ref 3.5–5.3)
RBC # BLD: 4.44 M/UL — SIGNIFICANT CHANGE UP (ref 4.2–5.8)
RBC # FLD: 13.2 % — SIGNIFICANT CHANGE UP (ref 10.3–14.5)
SODIUM SERPL-SCNC: 139 MMOL/L — SIGNIFICANT CHANGE UP (ref 135–145)
WBC # BLD: 7.95 K/UL — SIGNIFICANT CHANGE UP (ref 3.8–10.5)
WBC # FLD AUTO: 7.95 K/UL — SIGNIFICANT CHANGE UP (ref 3.8–10.5)

## 2020-11-03 PROCEDURE — 85027 COMPLETE CBC AUTOMATED: CPT

## 2020-11-03 PROCEDURE — 80048 BASIC METABOLIC PNL TOTAL CA: CPT

## 2020-11-03 PROCEDURE — 83036 HEMOGLOBIN GLYCOSYLATED A1C: CPT

## 2020-11-03 PROCEDURE — G0463: CPT

## 2020-11-03 PROCEDURE — 87086 URINE CULTURE/COLONY COUNT: CPT

## 2020-11-03 RX ORDER — CEFAZOLIN SODIUM 1 G
2000 VIAL (EA) INJECTION ONCE
Refills: 0 | Status: DISCONTINUED | OUTPATIENT
Start: 2020-11-11 | End: 2020-11-25

## 2020-11-03 NOTE — H&P PST ADULT - ATTENDING COMMENTS
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 Patient wishes to proceed with examination under anesthesia, left retrograde pyelogram, possibe left ureteroscopy, possible left ureteral stent exchange, possible stent removal, proceed as indicated.  Informed consent was obtained. Alternatives were given. Risks including but not limited to bleeding, infection, risk of anesthesia, pain, failure to cure, negative ureteroscopy, recurrent obstruction, renal failure, need for future procedure, and injury to surrounding structures were discussed. Patient wishes to proceed with procedure. I also spoke with his son as well at .

## 2020-11-03 NOTE — H&P PST ADULT - NSICDXPASTMEDICALHX_GEN_ALL_CORE_FT
PAST MEDICAL HISTORY:  Bladder cancer     Current every day smoker     Enlarged prostate     HTN (hypertension)     Prediabetes     Ureter cancer, right PAST MEDICAL HISTORY:  Bladder cancer x 20 yrs    Current every day smoker     Enlarged prostate     HTN (hypertension)     Prediabetes     Ureter cancer, right

## 2020-11-03 NOTE — H&P PST ADULT - NSICDXFAMILYHX_GEN_ALL_CORE_FT
FAMILY HISTORY:  FH: lung cancer, Mother  FH: type 2 diabetes, Father FAMILY HISTORY:  FH: lung cancer, Mother  FH: type 2 diabetes, Father

## 2020-11-03 NOTE — H&P PST ADULT - NSANTHSNORERD_ENT_A_CORE
Yes, Sandhya should have been notified by Triage (and I also sent a MyChart Message) that I did speak with the [facility]. They are waiting on a form that was sent to her email. She needs to fill this form out and return it to them before an appointment can be scheduled.    Unfortunately I have not heard back from my contact at the [facility] yet. I would recommend that Sandhya call pulmonology and schedule with the first available provider. They are all wonderful!    Finally, I also notified Sandhya that her white blood cell count was elevated which concerned me. Has she been able to schedule with Infectious Disease?    No

## 2020-11-03 NOTE — H&P PST ADULT - NSICDXPROBLEM_GEN_ALL_CORE_FT
PROBLEM DIAGNOSES  Problem: Malignant neoplasm of bladder  Assessment and Plan: cystoscopy left stent exchange

## 2020-11-03 NOTE — H&P PST ADULT - HISTORY OF PRESENT ILLNESS
69 yo male with history of prostate cancer and bladder cancer, requiring ureteral stent change every 3-5 mos.  last stent change was 2/20 and there was a delay with pandemic.  patient feeling ok at present, c/o dysuria and occasional hematuria.      Covid swab 11/8/20 at [facility].

## 2020-11-03 NOTE — H&P PST ADULT - NSICDXPASTSURGICALHX_GEN_ALL_CORE_FT
PAST SURGICAL HISTORY:  H/O right nephrectomy     History of bilateral cataract extraction PAST SURGICAL HISTORY:  H/O right nephrectomy     History of bilateral cataract extraction     S/P ureteral stent placement many times

## 2020-11-04 LAB
CULTURE RESULTS: SIGNIFICANT CHANGE UP
SPECIMEN SOURCE: SIGNIFICANT CHANGE UP

## 2020-11-08 ENCOUNTER — OUTPATIENT (OUTPATIENT)
Dept: OUTPATIENT SERVICES | Facility: HOSPITAL | Age: 70
LOS: 1 days | End: 2020-11-08
Payer: MEDICARE

## 2020-11-08 DIAGNOSIS — Z11.59 ENCOUNTER FOR SCREENING FOR OTHER VIRAL DISEASES: ICD-10-CM

## 2020-11-08 DIAGNOSIS — Z90.5 ACQUIRED ABSENCE OF KIDNEY: Chronic | ICD-10-CM

## 2020-11-08 DIAGNOSIS — Z98.41 CATARACT EXTRACTION STATUS, RIGHT EYE: Chronic | ICD-10-CM

## 2020-11-08 DIAGNOSIS — Z96.0 PRESENCE OF UROGENITAL IMPLANTS: Chronic | ICD-10-CM

## 2020-11-08 PROBLEM — C67.9 MALIGNANT NEOPLASM OF BLADDER, UNSPECIFIED: Chronic | Status: ACTIVE | Noted: 2019-10-17

## 2020-11-08 PROCEDURE — U0003: CPT

## 2020-11-09 LAB — SARS-COV-2 RNA SPEC QL NAA+PROBE: SIGNIFICANT CHANGE UP

## 2020-11-10 ENCOUNTER — TRANSCRIPTION ENCOUNTER (OUTPATIENT)
Age: 70
End: 2020-11-10

## 2020-11-11 ENCOUNTER — RESULT REVIEW (OUTPATIENT)
Age: 70
End: 2020-11-11

## 2020-11-11 ENCOUNTER — TRANSCRIPTION ENCOUNTER (OUTPATIENT)
Age: 70
End: 2020-11-11

## 2020-11-11 ENCOUNTER — APPOINTMENT (OUTPATIENT)
Dept: UROLOGY | Facility: HOSPITAL | Age: 70
End: 2020-11-11

## 2020-11-11 ENCOUNTER — OUTPATIENT (OUTPATIENT)
Dept: OUTPATIENT SERVICES | Facility: HOSPITAL | Age: 70
LOS: 1 days | End: 2020-11-11
Payer: MEDICARE

## 2020-11-11 VITALS
OXYGEN SATURATION: 97 % | RESPIRATION RATE: 18 BRPM | WEIGHT: 134.92 LBS | TEMPERATURE: 97 F | DIASTOLIC BLOOD PRESSURE: 57 MMHG | HEART RATE: 73 BPM | HEIGHT: 64 IN | SYSTOLIC BLOOD PRESSURE: 134 MMHG

## 2020-11-11 VITALS
DIASTOLIC BLOOD PRESSURE: 68 MMHG | RESPIRATION RATE: 18 BRPM | OXYGEN SATURATION: 99 % | HEART RATE: 72 BPM | SYSTOLIC BLOOD PRESSURE: 118 MMHG

## 2020-11-11 DIAGNOSIS — Z90.5 ACQUIRED ABSENCE OF KIDNEY: Chronic | ICD-10-CM

## 2020-11-11 DIAGNOSIS — C67.9 MALIGNANT NEOPLASM OF BLADDER, UNSPECIFIED: ICD-10-CM

## 2020-11-11 DIAGNOSIS — N13.30 UNSPECIFIED HYDRONEPHROSIS: ICD-10-CM

## 2020-11-11 DIAGNOSIS — Z96.0 PRESENCE OF UROGENITAL IMPLANTS: Chronic | ICD-10-CM

## 2020-11-11 DIAGNOSIS — N40.1 BENIGN PROSTATIC HYPERPLASIA WITH LOWER URINARY TRACT SYMPTOMS: ICD-10-CM

## 2020-11-11 DIAGNOSIS — Z98.41 CATARACT EXTRACTION STATUS, RIGHT EYE: Chronic | ICD-10-CM

## 2020-11-11 DIAGNOSIS — Z01.818 ENCOUNTER FOR OTHER PREPROCEDURAL EXAMINATION: ICD-10-CM

## 2020-11-11 LAB — GLUCOSE BLDC GLUCOMTR-MCNC: 140 MG/DL — HIGH (ref 70–99)

## 2020-11-11 PROCEDURE — C1769: CPT

## 2020-11-11 PROCEDURE — 52354 CYSTOURETERO W/BIOPSY: CPT | Mod: LT

## 2020-11-11 PROCEDURE — C2617: CPT

## 2020-11-11 PROCEDURE — 88112 CYTOPATH CELL ENHANCE TECH: CPT

## 2020-11-11 PROCEDURE — 88305 TISSUE EXAM BY PATHOLOGIST: CPT

## 2020-11-11 PROCEDURE — 52332 CYSTOSCOPY AND TREATMENT: CPT | Mod: LT

## 2020-11-11 PROCEDURE — C2625: CPT

## 2020-11-11 PROCEDURE — 52601 PROSTATECTOMY (TURP): CPT

## 2020-11-11 PROCEDURE — 52235 CYSTOSCOPY AND TREATMENT: CPT

## 2020-11-11 PROCEDURE — 88112 CYTOPATH CELL ENHANCE TECH: CPT | Mod: 26

## 2020-11-11 PROCEDURE — C1758: CPT

## 2020-11-11 PROCEDURE — 52235 CYSTOSCOPY AND TREATMENT: CPT | Mod: 59

## 2020-11-11 PROCEDURE — 82962 GLUCOSE BLOOD TEST: CPT

## 2020-11-11 PROCEDURE — 76000 FLUOROSCOPY <1 HR PHYS/QHP: CPT

## 2020-11-11 PROCEDURE — 88305 TISSUE EXAM BY PATHOLOGIST: CPT | Mod: 26,59

## 2020-11-11 PROCEDURE — 88305 TISSUE EXAM BY PATHOLOGIST: CPT | Mod: 26

## 2020-11-11 PROCEDURE — 74420 UROGRAPHY RTRGR +-KUB: CPT | Mod: 26

## 2020-11-11 PROCEDURE — 52630 REMOVE PROSTATE REGROWTH: CPT

## 2020-11-11 RX ORDER — ONDANSETRON 8 MG/1
4 TABLET, FILM COATED ORAL ONCE
Refills: 0 | Status: DISCONTINUED | OUTPATIENT
Start: 2020-11-11 | End: 2020-11-11

## 2020-11-11 RX ORDER — ACETAMINOPHEN 500 MG
1000 TABLET ORAL ONCE
Refills: 0 | Status: DISCONTINUED | OUTPATIENT
Start: 2020-11-11 | End: 2020-11-11

## 2020-11-11 RX ORDER — SODIUM CHLORIDE 9 MG/ML
1000 INJECTION, SOLUTION INTRAVENOUS
Refills: 0 | Status: DISCONTINUED | OUTPATIENT
Start: 2020-11-11 | End: 2020-11-25

## 2020-11-11 RX ORDER — SODIUM CHLORIDE 9 MG/ML
3 INJECTION INTRAMUSCULAR; INTRAVENOUS; SUBCUTANEOUS EVERY 8 HOURS
Refills: 0 | Status: DISCONTINUED | OUTPATIENT
Start: 2020-11-11 | End: 2020-11-11

## 2020-11-11 RX ORDER — LIDOCAINE HCL 20 MG/ML
0.2 VIAL (ML) INJECTION ONCE
Refills: 0 | Status: DISCONTINUED | OUTPATIENT
Start: 2020-11-11 | End: 2020-11-11

## 2020-11-11 RX ORDER — CEPHALEXIN 500 MG
1 CAPSULE ORAL
Qty: 9 | Refills: 0
Start: 2020-11-11 | End: 2020-11-13

## 2020-11-11 NOTE — DISCHARGE NOTE NURSING/CASE MANAGEMENT/SOCIAL WORK - PATIENT PORTAL LINK FT
You can access the FollowMyHealth Patient Portal offered by [facility] by registering at the following website: [email or web address]. By joining [employer]’s FollowMyHealth portal, you will also be able to view your health information using other applications (apps) compatible with our system.

## 2020-11-11 NOTE — ASU DISCHARGE PLAN (ADULT/PEDIATRIC) - ASU DC SPECIAL INSTRUCTIONSFT
Call the office if you have fever greater than 101, reeves not draining, pain not relieved with pain medication, nausea/vomiting. Drink plenty of fluids.  No heavy lifting >10lbs or straining. Avoid constipation. You may have intermittent blood in the urine.  This is normal. If your urine becomes bright red or with clots, please call the office. Take antibiotics as prescribed. Empty reeves bag as needed. Please call to schedule an appointment with Dr. Smith tomorrow 11/12/20 for removal of reeves.

## 2020-11-11 NOTE — ASU DISCHARGE PLAN (ADULT/PEDIATRIC) - CARE PROVIDER_API CALL
Paul Smith  UROLOGY  [address] [place] [address], [place] [address]  Phone: [phone]  Fax: [phone]  Follow Up Time:

## 2020-11-11 NOTE — BRIEF OPERATIVE NOTE - OPERATION/FINDINGS
Cystoscopy, left retrograde pyelogram, left ureteroscopy, left ureteral brushing biopsy, TURBT, TUR prostate biopsy, left ureteral stents replacement    bladder tumor noted at left ureteral orifice Cystoscopy, left retrograde pyelogram, left ureteroscopy, left ureteral brushing biopsy, TURBT, TUR prostate biopsy, two tandem left ureteral stents replacement    bladder tumor noted at left ureteral orifice

## 2020-11-11 NOTE — BRIEF OPERATIVE NOTE - NSICDXBRIEFPROCEDURE_GEN_ALL_CORE_FT
PROCEDURES:  Cystoscopy with replacement of left ureteral stent 11-Nov-2020 09:24:56  Stacey Aguirre  Cystoscopy, with TURP and TURBT 11-Nov-2020 09:22:28  Stacey Aguirre  Cystoscopy with left ureteroscopy with biopsy 11-Nov-2020 09:22:08  Stacey Aguirre

## 2020-11-11 NOTE — BRIEF OPERATIVE NOTE - SPECIMENS
1. left ureteral washing (cytology); 2. left ureteral brushing biopsy (cytology); 3. bladder tumor left ureteral orifice; 4. prostate chip

## 2020-11-11 NOTE — BRIEF OPERATIVE NOTE - NSICDXBRIEFPOSTOP_GEN_ALL_CORE_FT
POST-OP DIAGNOSIS:  Ureteral stricture, left 11-Nov-2020 09:23:51  Stacey Aguirre  Bladder tumor 11-Nov-2020 09:23:25 left ureteral orfice Stacey Aguirre

## 2020-11-11 NOTE — PRE-OP CHECKLIST - 1.
emotional support provided to patient pre op instruction and orientation to procedure provided to patient

## 2020-11-11 NOTE — ASU DISCHARGE PLAN (ADULT/PEDIATRIC) - CALL YOUR DOCTOR IF YOU HAVE ANY OF THE FOLLOWING:
Fever greater than (need to indicate Fahrenheit or Celsius)/Bleeding that does not stop/Inability to tolerate liquids or foods/Jones not draining/Nausea and vomiting that does not stop/Pain not relieved by Medications

## 2020-11-12 ENCOUNTER — APPOINTMENT (OUTPATIENT)
Dept: UROLOGY | Facility: CLINIC | Age: 70
End: 2020-11-12
Payer: MEDICARE

## 2020-11-12 ENCOUNTER — OUTPATIENT (OUTPATIENT)
Dept: OUTPATIENT SERVICES | Facility: HOSPITAL | Age: 70
LOS: 1 days | End: 2020-11-12
Payer: MEDICARE

## 2020-11-12 VITALS — DIASTOLIC BLOOD PRESSURE: 78 MMHG | HEART RATE: 88 BPM | SYSTOLIC BLOOD PRESSURE: 145 MMHG

## 2020-11-12 VITALS — TEMPERATURE: 97.4 F

## 2020-11-12 DIAGNOSIS — Z96.0 PRESENCE OF UROGENITAL IMPLANTS: Chronic | ICD-10-CM

## 2020-11-12 DIAGNOSIS — Z98.41 CATARACT EXTRACTION STATUS, RIGHT EYE: Chronic | ICD-10-CM

## 2020-11-12 DIAGNOSIS — Z90.5 ACQUIRED ABSENCE OF KIDNEY: Chronic | ICD-10-CM

## 2020-11-12 PROCEDURE — 51700 IRRIGATION OF BLADDER: CPT | Mod: 58

## 2020-11-12 PROCEDURE — 51700 IRRIGATION OF BLADDER: CPT

## 2020-11-13 LAB — NON-GYNECOLOGICAL CYTOLOGY STUDY: SIGNIFICANT CHANGE UP

## 2020-11-17 LAB — SURGICAL PATHOLOGY STUDY: SIGNIFICANT CHANGE UP

## 2020-11-18 LAB — NON-GYNECOLOGICAL CYTOLOGY STUDY: SIGNIFICANT CHANGE UP

## 2020-11-20 ENCOUNTER — NON-APPOINTMENT (OUTPATIENT)
Age: 70
End: 2020-11-20

## 2020-11-21 NOTE — LETTER BODY
[FreeTextEntry1] : Keith S. Leventhal DO\par [address]\[place] [address]\par [phone]\par \par Dear Dr. Leventhal,\par \par Reason for visit: Followup after TURBT\par \par This is a 70 year old gentleman with previous right urothelial carcinoma s/p right nephroureterectomy. Patient returns for voiding trial after s/p TURBT. Patient denies any changes in health. The past medical history and family history and social history are unchanged. All other review of systems are negative. Patient denies any changes in medications. Medication list was reconciled.\par \par Patient was given a voiding trial today. The patient's bladder was filled with 200 cc of water. Patient was able to void spontaneously.\par \par Assessment: Recurrent bladder cance.r \par \par I counseled the patient. I encouraged hydration. He will followup after pathology report is available.  Risks and alternatives were discussed. I answered the patient questions. The patient will follow-up as directed and will contact me with any questions or concerns.\par \par Plan: Followup 1 month

## 2020-12-02 DIAGNOSIS — C67.9 MALIGNANT NEOPLASM OF BLADDER, UNSPECIFIED: ICD-10-CM

## 2020-12-02 DIAGNOSIS — N40.1 BENIGN PROSTATIC HYPERPLASIA WITH LOWER URINARY TRACT SYMPTOMS: ICD-10-CM

## 2020-12-07 ENCOUNTER — OUTPATIENT (OUTPATIENT)
Dept: OUTPATIENT SERVICES | Facility: HOSPITAL | Age: 70
LOS: 1 days | End: 2020-12-07
Payer: MEDICARE

## 2020-12-07 ENCOUNTER — APPOINTMENT (OUTPATIENT)
Dept: UROLOGY | Facility: CLINIC | Age: 70
End: 2020-12-07
Payer: MEDICARE

## 2020-12-07 VITALS — DIASTOLIC BLOOD PRESSURE: 69 MMHG | HEART RATE: 58 BPM | SYSTOLIC BLOOD PRESSURE: 167 MMHG

## 2020-12-07 VITALS — SYSTOLIC BLOOD PRESSURE: 135 MMHG | DIASTOLIC BLOOD PRESSURE: 76 MMHG | RESPIRATION RATE: 16 BRPM | HEART RATE: 65 BPM

## 2020-12-07 DIAGNOSIS — R35.0 FREQUENCY OF MICTURITION: ICD-10-CM

## 2020-12-07 DIAGNOSIS — Z90.5 ACQUIRED ABSENCE OF KIDNEY: Chronic | ICD-10-CM

## 2020-12-07 DIAGNOSIS — Z96.0 PRESENCE OF UROGENITAL IMPLANTS: Chronic | ICD-10-CM

## 2020-12-07 DIAGNOSIS — Z98.41 CATARACT EXTRACTION STATUS, RIGHT EYE: Chronic | ICD-10-CM

## 2020-12-07 PROCEDURE — 96402U: CUSTOM | Mod: NC

## 2020-12-07 PROCEDURE — 51720 TREATMENT OF BLADDER LESION: CPT

## 2020-12-07 RX ORDER — OFLOXACIN 3 MG/ML
0.3 SOLUTION/ DROPS OPHTHALMIC
Qty: 5 | Refills: 0 | Status: ACTIVE | COMMUNITY
Start: 2020-08-12

## 2020-12-07 RX ORDER — NEOMYCIN SULFATE, POLYMYXIN B SULFATE AND DEXAMETHASONE 3.5; 10000; 1 MG/ML; [USP'U]/ML; MG/ML
3.5-10000-0.1 SUSPENSION OPHTHALMIC
Qty: 5 | Refills: 0 | Status: ACTIVE | COMMUNITY
Start: 2020-10-15

## 2020-12-07 RX ORDER — MITOMYCIN 40 MG/80ML
40 INJECTION, POWDER, LYOPHILIZED, FOR SOLUTION INTRAVENOUS
Qty: 1 | Refills: 0 | Status: COMPLETED | OUTPATIENT
Start: 2020-12-07

## 2020-12-07 RX ORDER — CEPHALEXIN 500 MG/1
500 CAPSULE ORAL
Qty: 9 | Refills: 0 | Status: DISCONTINUED | COMMUNITY
Start: 2020-11-11

## 2020-12-07 RX ORDER — MITOMYCIN 40 MG/80ML
40 INJECTION, POWDER, LYOPHILIZED, FOR SOLUTION INTRAVENOUS
Qty: 1 | Refills: 0 | Status: COMPLETED | OUTPATIENT
Start: 2020-12-07 | End: 2020-12-07

## 2020-12-07 RX ORDER — MITOMYCIN 5 MG/10ML
40 INJECTION, POWDER, LYOPHILIZED, FOR SOLUTION INTRAVENOUS ONCE
Refills: 0 | Status: DISCONTINUED | OUTPATIENT
Start: 2020-12-07 | End: 2020-12-21

## 2020-12-07 RX ORDER — NEOMYCIN AND POLYMYXIN B SULFATES AND DEXAMETHASONE 3.5; 10000; 1 MG/G; [IU]/G; MG/G
3.5-10000-0.1 OINTMENT OPHTHALMIC
Qty: 4 | Refills: 0 | Status: ACTIVE | COMMUNITY
Start: 2020-10-15

## 2020-12-07 RX ORDER — PREDNISOLONE ACETATE 10 MG/ML
1 SUSPENSION/ DROPS OPHTHALMIC
Qty: 15 | Refills: 0 | Status: ACTIVE | COMMUNITY
Start: 2020-11-19

## 2020-12-07 RX ADMIN — MITOMYCIN 0 MG: 40 INJECTION, POWDER, LYOPHILIZED, FOR SOLUTION INTRAVENOUS at 00:00

## 2020-12-12 DIAGNOSIS — C67.9 MALIGNANT NEOPLASM OF BLADDER, UNSPECIFIED: ICD-10-CM

## 2020-12-14 ENCOUNTER — OUTPATIENT (OUTPATIENT)
Dept: OUTPATIENT SERVICES | Facility: HOSPITAL | Age: 70
LOS: 1 days | End: 2020-12-14
Payer: MEDICARE

## 2020-12-14 ENCOUNTER — APPOINTMENT (OUTPATIENT)
Dept: UROLOGY | Facility: CLINIC | Age: 70
End: 2020-12-14
Payer: MEDICARE

## 2020-12-14 VITALS — TEMPERATURE: 97.8 F | SYSTOLIC BLOOD PRESSURE: 164 MMHG | DIASTOLIC BLOOD PRESSURE: 81 MMHG | HEART RATE: 89 BPM

## 2020-12-14 DIAGNOSIS — Z98.41 CATARACT EXTRACTION STATUS, RIGHT EYE: Chronic | ICD-10-CM

## 2020-12-14 DIAGNOSIS — Z90.5 ACQUIRED ABSENCE OF KIDNEY: Chronic | ICD-10-CM

## 2020-12-14 DIAGNOSIS — Z96.0 PRESENCE OF UROGENITAL IMPLANTS: Chronic | ICD-10-CM

## 2020-12-14 DIAGNOSIS — R35.0 FREQUENCY OF MICTURITION: ICD-10-CM

## 2020-12-14 PROCEDURE — 51720 TREATMENT OF BLADDER LESION: CPT | Mod: 58

## 2020-12-14 PROCEDURE — 51720 TREATMENT OF BLADDER LESION: CPT

## 2020-12-14 RX ORDER — MITOMYCIN 40 MG/80ML
40 INJECTION, POWDER, LYOPHILIZED, FOR SOLUTION INTRAVENOUS
Qty: 1 | Refills: 0 | Status: COMPLETED | OUTPATIENT
Start: 2020-12-14

## 2020-12-14 RX ORDER — MITOMYCIN 5 MG/10ML
40 INJECTION, POWDER, LYOPHILIZED, FOR SOLUTION INTRAVENOUS ONCE
Refills: 0 | Status: DISCONTINUED | OUTPATIENT
Start: 2020-12-14 | End: 2020-12-28

## 2020-12-14 RX ADMIN — MITOMYCIN 0 MG: 40 INJECTION, POWDER, LYOPHILIZED, FOR SOLUTION INTRAVENOUS at 00:00

## 2020-12-18 DIAGNOSIS — C67.9 MALIGNANT NEOPLASM OF BLADDER, UNSPECIFIED: ICD-10-CM

## 2020-12-21 ENCOUNTER — APPOINTMENT (OUTPATIENT)
Dept: UROLOGY | Facility: CLINIC | Age: 70
End: 2020-12-21
Payer: MEDICARE

## 2020-12-21 ENCOUNTER — OUTPATIENT (OUTPATIENT)
Dept: OUTPATIENT SERVICES | Facility: HOSPITAL | Age: 70
LOS: 1 days | End: 2020-12-21
Payer: MEDICARE

## 2020-12-21 VITALS — SYSTOLIC BLOOD PRESSURE: 118 MMHG | RESPIRATION RATE: 16 BRPM | HEART RATE: 86 BPM | DIASTOLIC BLOOD PRESSURE: 72 MMHG

## 2020-12-21 VITALS
SYSTOLIC BLOOD PRESSURE: 122 MMHG | HEIGHT: 64 IN | DIASTOLIC BLOOD PRESSURE: 72 MMHG | TEMPERATURE: 97.8 F | WEIGHT: 132 LBS | RESPIRATION RATE: 16 BRPM | HEART RATE: 86 BPM | BODY MASS INDEX: 22.53 KG/M2

## 2020-12-21 DIAGNOSIS — Z96.0 PRESENCE OF UROGENITAL IMPLANTS: Chronic | ICD-10-CM

## 2020-12-21 DIAGNOSIS — Z90.5 ACQUIRED ABSENCE OF KIDNEY: Chronic | ICD-10-CM

## 2020-12-21 DIAGNOSIS — R35.0 FREQUENCY OF MICTURITION: ICD-10-CM

## 2020-12-21 DIAGNOSIS — Z98.41 CATARACT EXTRACTION STATUS, RIGHT EYE: Chronic | ICD-10-CM

## 2020-12-21 PROCEDURE — 51720 TREATMENT OF BLADDER LESION: CPT

## 2020-12-21 PROCEDURE — 51720 TREATMENT OF BLADDER LESION: CPT | Mod: 58

## 2020-12-21 PROCEDURE — 99214 OFFICE O/P EST MOD 30 MIN: CPT | Mod: 25

## 2020-12-21 RX ORDER — MITOMYCIN 40 MG/80ML
40 INJECTION, POWDER, LYOPHILIZED, FOR SOLUTION INTRAVENOUS
Qty: 1 | Refills: 0 | Status: COMPLETED | OUTPATIENT
Start: 2020-12-13 | End: 2020-12-21

## 2020-12-21 RX ORDER — MITOMYCIN 5 MG/10ML
40 INJECTION, POWDER, LYOPHILIZED, FOR SOLUTION INTRAVENOUS ONCE
Refills: 0 | Status: DISCONTINUED | OUTPATIENT
Start: 2020-12-21 | End: 2021-01-04

## 2020-12-21 RX ORDER — MITOMYCIN 40 MG/80ML
40 INJECTION, POWDER, LYOPHILIZED, FOR SOLUTION INTRAVENOUS
Qty: 1 | Refills: 0 | Status: COMPLETED | OUTPATIENT
Start: 2020-12-21

## 2020-12-21 RX ADMIN — MITOMYCIN 0 MG: 40 INJECTION, POWDER, LYOPHILIZED, FOR SOLUTION INTRAVENOUS at 00:00

## 2020-12-22 DIAGNOSIS — C67.9 MALIGNANT NEOPLASM OF BLADDER, UNSPECIFIED: ICD-10-CM

## 2020-12-22 DIAGNOSIS — N40.1 BENIGN PROSTATIC HYPERPLASIA WITH LOWER URINARY TRACT SYMPTOMS: ICD-10-CM

## 2020-12-28 ENCOUNTER — OUTPATIENT (OUTPATIENT)
Dept: OUTPATIENT SERVICES | Facility: HOSPITAL | Age: 70
LOS: 1 days | End: 2020-12-28
Payer: MEDICARE

## 2020-12-28 ENCOUNTER — APPOINTMENT (OUTPATIENT)
Dept: UROLOGY | Facility: CLINIC | Age: 70
End: 2020-12-28
Payer: MEDICARE

## 2020-12-28 VITALS
TEMPERATURE: 97.7 F | RESPIRATION RATE: 16 BRPM | HEART RATE: 108 BPM | DIASTOLIC BLOOD PRESSURE: 69 MMHG | SYSTOLIC BLOOD PRESSURE: 173 MMHG

## 2020-12-28 VITALS — SYSTOLIC BLOOD PRESSURE: 120 MMHG | DIASTOLIC BLOOD PRESSURE: 78 MMHG | HEART RATE: 88 BPM

## 2020-12-28 DIAGNOSIS — Z96.0 PRESENCE OF UROGENITAL IMPLANTS: Chronic | ICD-10-CM

## 2020-12-28 DIAGNOSIS — Z98.41 CATARACT EXTRACTION STATUS, RIGHT EYE: Chronic | ICD-10-CM

## 2020-12-28 DIAGNOSIS — R35.0 FREQUENCY OF MICTURITION: ICD-10-CM

## 2020-12-28 DIAGNOSIS — Z90.5 ACQUIRED ABSENCE OF KIDNEY: Chronic | ICD-10-CM

## 2020-12-28 PROCEDURE — 51720 TREATMENT OF BLADDER LESION: CPT | Mod: 58

## 2020-12-28 PROCEDURE — 51720 TREATMENT OF BLADDER LESION: CPT

## 2020-12-28 RX ORDER — MITOMYCIN 40 MG/80ML
40 INJECTION, POWDER, LYOPHILIZED, FOR SOLUTION INTRAVENOUS
Qty: 1 | Refills: 0 | Status: COMPLETED | OUTPATIENT
Start: 2020-12-28

## 2020-12-28 RX ORDER — MITOMYCIN 5 MG/10ML
40 INJECTION, POWDER, LYOPHILIZED, FOR SOLUTION INTRAVENOUS ONCE
Refills: 0 | Status: DISCONTINUED | OUTPATIENT
Start: 2020-12-28 | End: 2021-01-11

## 2020-12-28 RX ORDER — MITOMYCIN 40 MG/80ML
40 INJECTION, POWDER, LYOPHILIZED, FOR SOLUTION INTRAVENOUS
Qty: 1 | Refills: 0 | Status: COMPLETED | OUTPATIENT
Start: 2020-12-28 | End: 2020-12-28

## 2020-12-28 RX ADMIN — MITOMYCIN 0 MG: 40 INJECTION, POWDER, LYOPHILIZED, FOR SOLUTION INTRAVENOUS at 00:00

## 2020-12-30 DIAGNOSIS — C67.9 MALIGNANT NEOPLASM OF BLADDER, UNSPECIFIED: ICD-10-CM

## 2021-01-04 ENCOUNTER — OUTPATIENT (OUTPATIENT)
Dept: OUTPATIENT SERVICES | Facility: HOSPITAL | Age: 71
LOS: 1 days | End: 2021-01-04
Payer: MEDICARE

## 2021-01-04 ENCOUNTER — APPOINTMENT (OUTPATIENT)
Dept: UROLOGY | Facility: CLINIC | Age: 71
End: 2021-01-04
Payer: MEDICARE

## 2021-01-04 VITALS
HEART RATE: 91 BPM | RESPIRATION RATE: 16 BRPM | SYSTOLIC BLOOD PRESSURE: 149 MMHG | TEMPERATURE: 97 F | DIASTOLIC BLOOD PRESSURE: 69 MMHG

## 2021-01-04 VITALS
RESPIRATION RATE: 14 BRPM | DIASTOLIC BLOOD PRESSURE: 74 MMHG | HEART RATE: 78 BPM | OXYGEN SATURATION: 97 % | SYSTOLIC BLOOD PRESSURE: 147 MMHG

## 2021-01-04 DIAGNOSIS — Z90.5 ACQUIRED ABSENCE OF KIDNEY: Chronic | ICD-10-CM

## 2021-01-04 DIAGNOSIS — R35.0 FREQUENCY OF MICTURITION: ICD-10-CM

## 2021-01-04 DIAGNOSIS — Z96.0 PRESENCE OF UROGENITAL IMPLANTS: Chronic | ICD-10-CM

## 2021-01-04 DIAGNOSIS — Z98.41 CATARACT EXTRACTION STATUS, RIGHT EYE: Chronic | ICD-10-CM

## 2021-01-04 PROCEDURE — 51720 TREATMENT OF BLADDER LESION: CPT

## 2021-01-04 PROCEDURE — 51720 TREATMENT OF BLADDER LESION: CPT | Mod: 58

## 2021-01-04 RX ORDER — MITOMYCIN 5 MG/10ML
40 INJECTION, POWDER, LYOPHILIZED, FOR SOLUTION INTRAVENOUS ONCE
Refills: 0 | Status: DISCONTINUED | OUTPATIENT
Start: 2021-01-04 | End: 2021-01-18

## 2021-01-04 RX ORDER — MITOMYCIN 40 MG/80ML
40 INJECTION, POWDER, LYOPHILIZED, FOR SOLUTION INTRAVENOUS
Qty: 1 | Refills: 0 | Status: COMPLETED | OUTPATIENT
Start: 2021-01-04

## 2021-01-04 RX ORDER — MITOMYCIN 40 MG/80ML
40 INJECTION, POWDER, LYOPHILIZED, FOR SOLUTION INTRAVENOUS
Qty: 1 | Refills: 0 | Status: COMPLETED | OUTPATIENT
Start: 2021-01-04 | End: 2021-01-04

## 2021-01-04 RX ADMIN — MITOMYCIN 0 MG: 40 INJECTION, POWDER, LYOPHILIZED, FOR SOLUTION INTRAVENOUS at 00:00

## 2021-01-08 DIAGNOSIS — C67.9 MALIGNANT NEOPLASM OF BLADDER, UNSPECIFIED: ICD-10-CM

## 2021-01-10 LAB — BACTERIA UR CULT: NORMAL

## 2021-01-11 ENCOUNTER — APPOINTMENT (OUTPATIENT)
Dept: UROLOGY | Facility: CLINIC | Age: 71
End: 2021-01-11

## 2021-01-11 VITALS — BODY MASS INDEX: 22.53 KG/M2 | TEMPERATURE: 98.2 F | HEIGHT: 64 IN | WEIGHT: 132 LBS

## 2021-01-25 ENCOUNTER — OUTPATIENT (OUTPATIENT)
Dept: OUTPATIENT SERVICES | Facility: HOSPITAL | Age: 71
LOS: 1 days | End: 2021-01-25
Payer: MEDICARE

## 2021-01-25 ENCOUNTER — APPOINTMENT (OUTPATIENT)
Dept: UROLOGY | Facility: CLINIC | Age: 71
End: 2021-01-25
Payer: MEDICARE

## 2021-01-25 VITALS — SYSTOLIC BLOOD PRESSURE: 174 MMHG | DIASTOLIC BLOOD PRESSURE: 76 MMHG | HEART RATE: 93 BPM

## 2021-01-25 VITALS — TEMPERATURE: 97.8 F

## 2021-01-25 DIAGNOSIS — Z96.0 PRESENCE OF UROGENITAL IMPLANTS: Chronic | ICD-10-CM

## 2021-01-25 DIAGNOSIS — Z98.41 CATARACT EXTRACTION STATUS, RIGHT EYE: Chronic | ICD-10-CM

## 2021-01-25 DIAGNOSIS — Z90.5 ACQUIRED ABSENCE OF KIDNEY: Chronic | ICD-10-CM

## 2021-01-25 DIAGNOSIS — C67.9 MALIGNANT NEOPLASM OF BLADDER, UNSPECIFIED: ICD-10-CM

## 2021-01-25 DIAGNOSIS — N40.1 BENIGN PROSTATIC HYPERPLASIA WITH LOWER URINARY TRACT SYMPTOMS: ICD-10-CM

## 2021-01-25 DIAGNOSIS — R35.0 FREQUENCY OF MICTURITION: ICD-10-CM

## 2021-01-25 PROCEDURE — 51720 TREATMENT OF BLADDER LESION: CPT | Mod: 79

## 2021-01-25 PROCEDURE — 99214 OFFICE O/P EST MOD 30 MIN: CPT | Mod: 24

## 2021-01-25 PROCEDURE — 51720 TREATMENT OF BLADDER LESION: CPT

## 2021-01-25 RX ORDER — MITOMYCIN 40 MG/80ML
40 INJECTION, POWDER, LYOPHILIZED, FOR SOLUTION INTRAVENOUS
Qty: 1 | Refills: 0 | Status: DISCONTINUED | OUTPATIENT
Start: 2021-01-25 | End: 2021-01-25

## 2021-01-25 RX ORDER — METHYLPREDNISOLONE 4 MG/1
4 TABLET ORAL
Qty: 1 | Refills: 0 | Status: ACTIVE | COMMUNITY
Start: 2021-01-25 | End: 1900-01-01

## 2021-01-25 RX ORDER — MITOMYCIN 5 MG/10ML
40 INJECTION, POWDER, LYOPHILIZED, FOR SOLUTION INTRAVENOUS ONCE
Refills: 0 | Status: DISCONTINUED | OUTPATIENT
Start: 2021-01-25 | End: 2021-02-08

## 2021-01-26 NOTE — LETTER BODY
[FreeTextEntry1] : Kalin OBREGON Leventhal DO\par [address]\[place] [address]\par [phone]\par \par Dear Dr. Leventhal,\par \par Reason for visit: Followup after TURBT\par \par This is a 70 year old gentleman with previous right urothelial carcinoma s/p right nephroureterectomy. The patient returns today for bladder instillation with Mitomycin. However, the patient complains of diffuse pruritic rash on arms and legs. Patient denies any other changes in health. The past medical history and family history and social history are unchanged. All other review of systems are negative. Patient denies any changes in medications. Medication list was reconciled.\par \par On examination, the patient is an older-appearing gentleman in no acute distress. He is alert and oriented follows commands. He has normal mood and affect. He is normocephalic. Neck is supple. Oral no thrush Respirations are unlabored. His abdomen is soft and nontender. Bladder is nonpalpable. No CVA tenderness. Neurologically he is grossly intact. No peripheral edema. Skin without gross abnormality. He has normal male external genitalia. Normal meatus. Bilateral testes are descended intrascrotally and normal to palpation. He has a nonerythematous papillary rash across his arms and legs.\par \par I counseled the patient. He will not undergo bladder instillation with Mitomycin today. I discussed with the patient that his rash is consistent with a drug reaction. He will discontinue bladder instillations with Mitomycin. I recommended the patient try a steroid taper. I discussed the potential side effects of the medication. I counseled the patient on its use and side effects. If the patient develops any side effects, the patient will discontinue the medication and contact me. He will follow-up in 6 weeks for cystoscopy. Risks and alternatives were discussed. I answered the patient questions. The patient will follow-up as directed and will contact me with any questions or concerns. Thank you for the opportunity to participate in the care of Mr. AHMADI. I will keep you updated on his progress.\par \par Plan: DC bladder instillations with Mitomycin. Try steroid taper. Follow-up in 6 weeks for cystoscopy.

## 2021-01-26 NOTE — ADDENDUM
[FreeTextEntry1] : Entered by Nils Tavarez, acting as scribe for Dr. Paul Smith.\par \par The documentation recorded by the scribe accurately reflects the service I personally performed and the decisions made by me.\par

## 2021-03-08 ENCOUNTER — OUTPATIENT (OUTPATIENT)
Dept: OUTPATIENT SERVICES | Facility: HOSPITAL | Age: 71
LOS: 1 days | End: 2021-03-08
Payer: MEDICARE

## 2021-03-08 ENCOUNTER — APPOINTMENT (OUTPATIENT)
Dept: UROLOGY | Facility: CLINIC | Age: 71
End: 2021-03-08
Payer: MEDICARE

## 2021-03-08 VITALS — DIASTOLIC BLOOD PRESSURE: 77 MMHG | HEART RATE: 77 BPM | SYSTOLIC BLOOD PRESSURE: 163 MMHG

## 2021-03-08 DIAGNOSIS — Z90.5 ACQUIRED ABSENCE OF KIDNEY: Chronic | ICD-10-CM

## 2021-03-08 DIAGNOSIS — Z96.0 PRESENCE OF UROGENITAL IMPLANTS: Chronic | ICD-10-CM

## 2021-03-08 DIAGNOSIS — R35.0 FREQUENCY OF MICTURITION: ICD-10-CM

## 2021-03-08 DIAGNOSIS — Z98.41 CATARACT EXTRACTION STATUS, RIGHT EYE: Chronic | ICD-10-CM

## 2021-03-08 LAB
ANION GAP SERPL CALC-SCNC: 9 MMOL/L
BUN SERPL-MCNC: 37 MG/DL
CALCIUM SERPL-MCNC: 8.4 MG/DL
CHLORIDE SERPL-SCNC: 106 MMOL/L
CO2 SERPL-SCNC: 30 MMOL/L
CREAT SERPL-MCNC: 1.85 MG/DL
GLUCOSE SERPL-MCNC: 91 MG/DL
POTASSIUM SERPL-SCNC: 4.7 MMOL/L
SODIUM SERPL-SCNC: 144 MMOL/L

## 2021-03-08 PROCEDURE — 52000 CYSTOURETHROSCOPY: CPT

## 2021-03-08 PROCEDURE — 99213 OFFICE O/P EST LOW 20 MIN: CPT | Mod: 25

## 2021-03-08 PROCEDURE — 88112 CYTOPATH CELL ENHANCE TECH: CPT | Mod: 26

## 2021-03-08 NOTE — LETTER BODY
[FreeTextEntry1] : Kalin OBREGON Leventhal DO\par [address]\[place] [address]\par [phone]\par \par Dear Dr. Leventhal,\par \par Reason for visit: Followup after TURBT.\par \par This is a 71 year old gentleman with previous right urothelial carcinoma s/p right nephroureterectomy and recurrent bladder cancer s/p TURBT. The patient previously experienced a diffuse pruritic rash on arms and legs with Mitomycin bladder instillation. He returns today for follow-up and cystoscopy. Since his last visit, the patient reports taking a steroid taper as directed. He notes resolution of his rash. Patient denies any other changes in health. The past medical history and family history and social history are unchanged. All other review of systems are negative. Patient denies any changes in medications. Medication list was reconciled.\par \par On examination, the patient is an older-appearing gentleman in no acute distress. He is alert and oriented follows commands. He has normal mood and affect. He is normocephalic. Neck is supple. Oral no thrush Respirations are unlabored. His abdomen is soft and nontender. Bladder is nonpalpable. No CVA tenderness. Neurologically he is grossly intact. No peripheral edema. Skin without gross abnormality. He has normal male external genitalia. Normal meatus. Bilateral testes are descended intrascrotally and normal to palpation. He has a nonerythematous papillary rash across his arms and legs.\par \par His cystoscopy today demonstrated two ureteral stents in place, but was otherwise unremarkable.\par \par Assessment: Bladder cancer, s/p TURBT.\par \par I counseled the patient. In terms of his previous bladder cancer, I reassured the patient as his cystoscopy today was unremarkable. He will obtain BMP and urine cytology today to ensure stability. I encouraged the patient to follow-up in 3 months for stents exchange. Risks and alternatives were discussed. I answered the patient questions. The patient will follow-up as directed and will contact me with any questions or concerns. Thank you for the opportunity to participate in the care of Mr. AHMADI. I will keep you updated on his progress.\par \par Plan: BMP. Cytology. Follow-up in 3 months for stents exchange.

## 2021-03-10 LAB — URINE CYTOLOGY: NORMAL

## 2021-03-18 DIAGNOSIS — N40.1 BENIGN PROSTATIC HYPERPLASIA WITH LOWER URINARY TRACT SYMPTOMS: ICD-10-CM

## 2021-03-18 DIAGNOSIS — N13.30 UNSPECIFIED HYDRONEPHROSIS: ICD-10-CM

## 2021-03-18 DIAGNOSIS — Z00.00 ENCOUNTER FOR GENERAL ADULT MEDICAL EXAMINATION WITHOUT ABNORMAL FINDINGS: ICD-10-CM

## 2021-03-18 DIAGNOSIS — C67.9 MALIGNANT NEOPLASM OF BLADDER, UNSPECIFIED: ICD-10-CM

## 2021-06-10 ENCOUNTER — APPOINTMENT (OUTPATIENT)
Dept: UROLOGY | Facility: CLINIC | Age: 71
End: 2021-06-10
Payer: MEDICARE

## 2021-06-10 LAB
BASOPHILS # BLD AUTO: 0.03 K/UL
BASOPHILS NFR BLD AUTO: 0.4 %
EOSINOPHIL # BLD AUTO: 0.2 K/UL
EOSINOPHIL NFR BLD AUTO: 2.6 %
HCT VFR BLD CALC: 39.8 %
HGB BLD-MCNC: 12.8 G/DL
IMM GRANULOCYTES NFR BLD AUTO: 0.1 %
LYMPHOCYTES # BLD AUTO: 1.24 K/UL
LYMPHOCYTES NFR BLD AUTO: 16.3 %
MAN DIFF?: NORMAL
MCHC RBC-ENTMCNC: 29.5 PG
MCHC RBC-ENTMCNC: 32.2 GM/DL
MCV RBC AUTO: 91.7 FL
MONOCYTES # BLD AUTO: 0.78 K/UL
MONOCYTES NFR BLD AUTO: 10.3 %
NEUTROPHILS # BLD AUTO: 5.33 K/UL
NEUTROPHILS NFR BLD AUTO: 70.3 %
PLATELET # BLD AUTO: 277 K/UL
RBC # BLD: 4.34 M/UL
RBC # FLD: 13.4 %
WBC # FLD AUTO: 7.59 K/UL

## 2021-06-10 PROCEDURE — 99213 OFFICE O/P EST LOW 20 MIN: CPT

## 2021-06-11 LAB
ANION GAP SERPL CALC-SCNC: 14 MMOL/L
BUN SERPL-MCNC: 52 MG/DL
CALCIUM SERPL-MCNC: 8.1 MG/DL
CHLORIDE SERPL-SCNC: 105 MMOL/L
CO2 SERPL-SCNC: 23 MMOL/L
CREAT SERPL-MCNC: 2.14 MG/DL
GLUCOSE SERPL-MCNC: 115 MG/DL
POTASSIUM SERPL-SCNC: 5.2 MMOL/L
SODIUM SERPL-SCNC: 142 MMOL/L

## 2021-06-14 LAB — BACTERIA UR CULT: NORMAL

## 2021-06-24 NOTE — ADDENDUM
[FreeTextEntry1] : Entered by Yana Corral, acting as scribe for Dr. Paul Smith.\par \par The documentation recorded by the scribe accurately reflects the service I personally performed and the decisions made by me.

## 2021-06-24 NOTE — LETTER BODY
[FreeTextEntry1] : Kalin S. Leventhal DO\par [address]\[place] [address]\par [phone]\par \par Dear Dr. Leventhal,\par \par Reason for visit: Bladder cancer s/p TURBT. Left renal obstruction. \par \par This is a 71 year old gentleman with previous right urothelial carcinoma s/p right nephroureterectomy and recurrent bladder cancer s/p TURBT presenting with left renal obstruction.. The patient previously experienced a diffuse pruritic rash on arms and legs with Mitomycin bladder instillation. Since he was last seen, the patient underwent a cystoscopy in March which demonstrated two ureteral stents in place, but was otherwise unremarkable He returns today for follow-up. Patient denies any other changes in health. The past medical history and family history and social history are unchanged. All other review of systems are negative. Patient denies any changes in medications. Medication list was reconciled.\par \par On examination, the patient is an older-appearing gentleman in no acute distress. He is alert and oriented follows commands. He has normal mood and affect. He is normocephalic. Neck is supple. Oral no thrush Respirations are unlabored. His abdomen is soft and nontender. Bladder is nonpalpable. No CVA tenderness. Neurologically he is grossly intact. No peripheral edema. Skin without gross abnormality. He has normal male external genitalia. Normal meatus. Bilateral testes are descended intrascrotally and normal to palpation\par  \par His BMP from March demonstrated abnormal renal functions, creatinine 1.85. His urine culture was negative. \par \par Assessment: Bladder cancer, s/p TURBT. Left ureteral obstruction. \par \par I counseled the patient. In terms of his previous bladder cancer, he is stable.  I reassured the patient. He will obtain BMP and urine cytology today to ensure stability. In terms of his left renal obstruction, I recommended the patient undergo left stent exchange. I counseled the patient regarding the procedure. The risks and benefits were discussed. Alternatives were given. I answered the patient questions. The patient will take the necessary preparations for the procedure.I recommended the patient obtain blood tests. Risks and alternatives were discussed. I answered the patient questions. The patient will follow-up as directed and will contact me with any questions or concerns. Thank you for the opportunity to participate in the care of Mr. AHMADI. I will keep you updated on his progress.\par \par Plan: BMP. Urine culture. Urine Cytology. Blood tests. Left stent exchange.

## 2021-06-30 ENCOUNTER — OUTPATIENT (OUTPATIENT)
Dept: OUTPATIENT SERVICES | Facility: HOSPITAL | Age: 71
LOS: 1 days | End: 2021-06-30
Payer: MEDICARE

## 2021-06-30 VITALS
WEIGHT: 132.94 LBS | TEMPERATURE: 98 F | HEART RATE: 81 BPM | DIASTOLIC BLOOD PRESSURE: 67 MMHG | OXYGEN SATURATION: 96 % | SYSTOLIC BLOOD PRESSURE: 123 MMHG | RESPIRATION RATE: 17 BRPM | HEIGHT: 64 IN

## 2021-06-30 DIAGNOSIS — N13.30 UNSPECIFIED HYDRONEPHROSIS: ICD-10-CM

## 2021-06-30 DIAGNOSIS — C67.9 MALIGNANT NEOPLASM OF BLADDER, UNSPECIFIED: ICD-10-CM

## 2021-06-30 DIAGNOSIS — Z96.0 PRESENCE OF UROGENITAL IMPLANTS: Chronic | ICD-10-CM

## 2021-06-30 DIAGNOSIS — Z90.5 ACQUIRED ABSENCE OF KIDNEY: Chronic | ICD-10-CM

## 2021-06-30 DIAGNOSIS — N40.1 BENIGN PROSTATIC HYPERPLASIA WITH LOWER URINARY TRACT SYMPTOMS: ICD-10-CM

## 2021-06-30 DIAGNOSIS — I10 ESSENTIAL (PRIMARY) HYPERTENSION: ICD-10-CM

## 2021-06-30 DIAGNOSIS — Z01.818 ENCOUNTER FOR OTHER PREPROCEDURAL EXAMINATION: ICD-10-CM

## 2021-06-30 DIAGNOSIS — Z98.41 CATARACT EXTRACTION STATUS, RIGHT EYE: Chronic | ICD-10-CM

## 2021-06-30 LAB
A1C WITH ESTIMATED AVERAGE GLUCOSE RESULT: 6.5 % — HIGH (ref 4–5.6)
ANION GAP SERPL CALC-SCNC: 16 MMOL/L — SIGNIFICANT CHANGE UP (ref 5–17)
BUN SERPL-MCNC: 48 MG/DL — HIGH (ref 7–23)
CALCIUM SERPL-MCNC: 8.2 MG/DL — LOW (ref 8.4–10.5)
CHLORIDE SERPL-SCNC: 102 MMOL/L — SIGNIFICANT CHANGE UP (ref 96–108)
CO2 SERPL-SCNC: 22 MMOL/L — SIGNIFICANT CHANGE UP (ref 22–31)
CREAT SERPL-MCNC: 2.15 MG/DL — HIGH (ref 0.5–1.3)
ESTIMATED AVERAGE GLUCOSE: 140 MG/DL — HIGH (ref 68–114)
GLUCOSE SERPL-MCNC: 136 MG/DL — HIGH (ref 70–99)
HCT VFR BLD CALC: 39.9 % — SIGNIFICANT CHANGE UP (ref 39–50)
HGB BLD-MCNC: 13 G/DL — SIGNIFICANT CHANGE UP (ref 13–17)
MCHC RBC-ENTMCNC: 29.5 PG — SIGNIFICANT CHANGE UP (ref 27–34)
MCHC RBC-ENTMCNC: 32.6 GM/DL — SIGNIFICANT CHANGE UP (ref 32–36)
MCV RBC AUTO: 90.7 FL — SIGNIFICANT CHANGE UP (ref 80–100)
NRBC # BLD: 0 /100 WBCS — SIGNIFICANT CHANGE UP (ref 0–0)
PLATELET # BLD AUTO: 256 K/UL — SIGNIFICANT CHANGE UP (ref 150–400)
POTASSIUM SERPL-MCNC: 4.3 MMOL/L — SIGNIFICANT CHANGE UP (ref 3.5–5.3)
POTASSIUM SERPL-SCNC: 4.3 MMOL/L — SIGNIFICANT CHANGE UP (ref 3.5–5.3)
RBC # BLD: 4.4 M/UL — SIGNIFICANT CHANGE UP (ref 4.2–5.8)
RBC # FLD: 13.4 % — SIGNIFICANT CHANGE UP (ref 10.3–14.5)
SODIUM SERPL-SCNC: 140 MMOL/L — SIGNIFICANT CHANGE UP (ref 135–145)
WBC # BLD: 9.6 K/UL — SIGNIFICANT CHANGE UP (ref 3.8–10.5)
WBC # FLD AUTO: 9.6 K/UL — SIGNIFICANT CHANGE UP (ref 3.8–10.5)

## 2021-06-30 PROCEDURE — 80048 BASIC METABOLIC PNL TOTAL CA: CPT

## 2021-06-30 PROCEDURE — 83036 HEMOGLOBIN GLYCOSYLATED A1C: CPT

## 2021-06-30 PROCEDURE — 85027 COMPLETE CBC AUTOMATED: CPT

## 2021-06-30 PROCEDURE — 87086 URINE CULTURE/COLONY COUNT: CPT

## 2021-06-30 PROCEDURE — G0463: CPT

## 2021-06-30 RX ORDER — LIDOCAINE HCL 20 MG/ML
0.2 VIAL (ML) INJECTION ONCE
Refills: 0 | Status: DISCONTINUED | OUTPATIENT
Start: 2021-07-14 | End: 2021-07-28

## 2021-06-30 RX ORDER — SODIUM CHLORIDE 9 MG/ML
3 INJECTION INTRAMUSCULAR; INTRAVENOUS; SUBCUTANEOUS EVERY 8 HOURS
Refills: 0 | Status: DISCONTINUED | OUTPATIENT
Start: 2021-07-14 | End: 2021-07-28

## 2021-06-30 NOTE — H&P PST ADULT - NSICDXPASTMEDICALHX_GEN_ALL_CORE_FT
PAST MEDICAL HISTORY:  Arthritis     Bladder cancer x 20 yrs    Current every day smoker     Emphysema lung diagnosed ct scan 2020    Enlarged prostate     HTN (hypertension)     Prediabetes     Ureter cancer, right      PAST MEDICAL HISTORY:  Arthritis     Bladder cancer x 20 yrs    Current every day smoker     Emphysema lung diagnosed ct scan 2020    Enlarged prostate     HTN (hypertension)     Prediabetes     Right inguinal hernia     Ureter cancer, right

## 2021-06-30 NOTE — H&P PST ADULT - NSICDXPROBLEM_GEN_ALL_CORE_FT
PROBLEM DIAGNOSES  Problem: Unspecified hydronephrosis  Assessment and Plan: Left stent exchange cystoscopy on 07/14/21  pre op instructions given  cbc,bmp,HAiC urine culture sent         PROBLEM DIAGNOSES  Problem: Unspecified hydronephrosis  Assessment and Plan: Left stent exchange cystoscopy on 07/14/21  pre op instructions given  cbc,bmp,HAiC urine culture sent  continue finasteride  medical clearance on 07/02/21      Problem: Hypertension  Assessment and Plan: continue antihypertensives

## 2021-06-30 NOTE — H&P PST ADULT - HISTORY OF PRESENT ILLNESS
70 yo male with PMH of right urothelial carcinoma s/p right nephroureterectomy and recurrent bladder cancer s/p TURBT with left renal hydronephrosis  requiring ureteral stent change every 5-6 months.  last stent change was 11/2020. 70 yo male with PMH of emphysema{asymptomatic as per pt}, HTN, right urothelial carcinoma s/p right nephroureterectomy and recurrent bladder cancer s/p TURBT with left renal hydronephrosis  requiring ureteral stent change every 5-6 months,  last stent change was 11/2020. Denies hematuria, urinary hesitancy dysuria or pelvic discomfort. Pt presented to PST for left ureteral stent exchange/ cystoscopy on 07/14/21. Pt denies chest pain, dyspnea, nausea, vomiting.    ****Denies recent travel, covid infection or covid exposure  Pt fully vaccinated, reports in chart. 72 yo male with PMH of emphysema{asymptomatic as per pt}, HTN, right urothelial carcinoma s/p right nephroureterectomy and recurrent bladder cancer s/p TURBT with left renal hydronephrosis  requiring ureteral stent change every 5-6 months,  last stent change was 11/2020. Denies hematuria, urinary hesitancy dysuria or pelvic discomfort. Pt presented to PST for left ureteral stent exchange/ cystoscopy on 07/14/21. Pt denies chest pain, dyspnea, nausea, vomiting.    ****Denies recent travel, covid infection or covid exposure  Pt fully vaccinated, reports in chart.  ++Advised Dr Smith via email and spoke to Manisha at office, Pt urine culture tested positive >100,000 CFU/ml Gram positive organisms. Please call Pt and order antibiotics.

## 2021-06-30 NOTE — H&P PST ADULT - NSICDXPASTSURGICALHX_GEN_ALL_CORE_FT
PAST SURGICAL HISTORY:  H/O right nephrectomy     History of bilateral cataract extraction     S/P ureteral stent placement many times

## 2021-06-30 NOTE — H&P PST ADULT - ATTENDING COMMENTS
Patient with left ureteral obstruction and recurrent bladder cancer. Patient wishes to proceed with left stent placement possible bladder biopsy, TURBT. Informed consent was obtained. Alternatives were given. Risks including but not limited to bleeding, infection, risk of anesthesia, pain, failure to cure, stone migration, need for future procedure, and injury to surrounding structures were discussed. Patient wishes to proceed with procedure.

## 2021-06-30 NOTE — H&P PST ADULT - ACTIVITY
walks daily 1-2 miles,climb 2-3 flights of stairs, do moderate household activities without any dyspnea or chest pain

## 2021-07-02 LAB
CULTURE RESULTS: SIGNIFICANT CHANGE UP
SPECIMEN SOURCE: SIGNIFICANT CHANGE UP

## 2021-07-04 ENCOUNTER — NON-APPOINTMENT (OUTPATIENT)
Age: 71
End: 2021-07-04

## 2021-07-06 ENCOUNTER — NON-APPOINTMENT (OUTPATIENT)
Age: 71
End: 2021-07-06

## 2021-07-09 PROBLEM — M19.90 UNSPECIFIED OSTEOARTHRITIS, UNSPECIFIED SITE: Chronic | Status: ACTIVE | Noted: 2021-06-30

## 2021-07-09 PROBLEM — J43.9 EMPHYSEMA, UNSPECIFIED: Chronic | Status: ACTIVE | Noted: 2021-06-30

## 2021-07-13 ENCOUNTER — TRANSCRIPTION ENCOUNTER (OUTPATIENT)
Age: 71
End: 2021-07-13

## 2021-07-14 ENCOUNTER — APPOINTMENT (OUTPATIENT)
Dept: UROLOGY | Facility: HOSPITAL | Age: 71
End: 2021-07-14

## 2021-07-14 ENCOUNTER — RESULT REVIEW (OUTPATIENT)
Age: 71
End: 2021-07-14

## 2021-07-14 ENCOUNTER — OUTPATIENT (OUTPATIENT)
Dept: OUTPATIENT SERVICES | Facility: HOSPITAL | Age: 71
LOS: 1 days | End: 2021-07-14
Payer: MEDICARE

## 2021-07-14 VITALS
OXYGEN SATURATION: 96 % | HEART RATE: 80 BPM | RESPIRATION RATE: 16 BRPM | SYSTOLIC BLOOD PRESSURE: 116 MMHG | DIASTOLIC BLOOD PRESSURE: 59 MMHG

## 2021-07-14 VITALS
TEMPERATURE: 98 F | SYSTOLIC BLOOD PRESSURE: 107 MMHG | RESPIRATION RATE: 16 BRPM | DIASTOLIC BLOOD PRESSURE: 56 MMHG | HEART RATE: 64 BPM | WEIGHT: 132.94 LBS | HEIGHT: 64 IN | OXYGEN SATURATION: 98 %

## 2021-07-14 DIAGNOSIS — Z90.5 ACQUIRED ABSENCE OF KIDNEY: Chronic | ICD-10-CM

## 2021-07-14 DIAGNOSIS — N13.5 CROSSING VESSEL AND STRICTURE OF URETER WITHOUT HYDRONEPHROSIS: ICD-10-CM

## 2021-07-14 DIAGNOSIS — Z98.41 CATARACT EXTRACTION STATUS, RIGHT EYE: Chronic | ICD-10-CM

## 2021-07-14 DIAGNOSIS — N13.30 UNSPECIFIED HYDRONEPHROSIS: ICD-10-CM

## 2021-07-14 DIAGNOSIS — Z96.0 PRESENCE OF UROGENITAL IMPLANTS: Chronic | ICD-10-CM

## 2021-07-14 DIAGNOSIS — N40.1 BENIGN PROSTATIC HYPERPLASIA WITH LOWER URINARY TRACT SYMPTOMS: ICD-10-CM

## 2021-07-14 DIAGNOSIS — C67.9 MALIGNANT NEOPLASM OF BLADDER, UNSPECIFIED: ICD-10-CM

## 2021-07-14 LAB — GLUCOSE BLDC GLUCOMTR-MCNC: 154 MG/DL — HIGH (ref 70–99)

## 2021-07-14 PROCEDURE — C2617: CPT

## 2021-07-14 PROCEDURE — C1758: CPT

## 2021-07-14 PROCEDURE — 88112 CYTOPATH CELL ENHANCE TECH: CPT | Mod: 26

## 2021-07-14 PROCEDURE — C1769: CPT

## 2021-07-14 PROCEDURE — 74420 UROGRAPHY RTRGR +-KUB: CPT | Mod: 26

## 2021-07-14 PROCEDURE — 82962 GLUCOSE BLOOD TEST: CPT

## 2021-07-14 PROCEDURE — 52351 CYSTOURETERO & OR PYELOSCOPE: CPT

## 2021-07-14 PROCEDURE — 52351 CYSTOURETERO & OR PYELOSCOPE: CPT | Mod: LT

## 2021-07-14 PROCEDURE — 52332 CYSTOSCOPY AND TREATMENT: CPT | Mod: LT

## 2021-07-14 PROCEDURE — 76000 FLUOROSCOPY <1 HR PHYS/QHP: CPT

## 2021-07-14 PROCEDURE — 88112 CYTOPATH CELL ENHANCE TECH: CPT

## 2021-07-14 RX ORDER — CEPHALEXIN 500 MG
1 CAPSULE ORAL
Qty: 2 | Refills: 0
Start: 2021-07-14 | End: 2021-07-14

## 2021-07-14 RX ORDER — CEFAZOLIN SODIUM 1 G
2000 VIAL (EA) INJECTION ONCE
Refills: 0 | Status: COMPLETED | OUTPATIENT
Start: 2021-07-14 | End: 2021-07-14

## 2021-07-14 RX ORDER — SODIUM CHLORIDE 9 MG/ML
1000 INJECTION, SOLUTION INTRAVENOUS
Refills: 0 | Status: DISCONTINUED | OUTPATIENT
Start: 2021-07-14 | End: 2021-07-28

## 2021-07-14 RX ORDER — ONDANSETRON 8 MG/1
4 TABLET, FILM COATED ORAL ONCE
Refills: 0 | Status: DISCONTINUED | OUTPATIENT
Start: 2021-07-14 | End: 2021-07-28

## 2021-07-14 NOTE — ASU PATIENT PROFILE, ADULT - PMH
Arthritis    Bladder cancer  x 20 yrs  Current every day smoker    Emphysema lung  diagnosed ct scan 2020  Enlarged prostate    HTN (hypertension)    Prediabetes    Right inguinal hernia    Ureter cancer, right

## 2021-07-14 NOTE — ASU DISCHARGE PLAN (ADULT/PEDIATRIC) - D. IF YOU HAD ANY TYPE OF SEDATION, YOU MAY EXPERIENCE LIGHTHEADEDNESS, DIZZINESS, OR SLEEPINESS FOLLOWING YOUR PROCEDURE. A RESPONSIBLE ADULT SHOULD STAY WITH YOU FOR AT LEAST 24 HOURS FOLLOWING YOUR PROCEDURE.
[facility]

                             Created on: 2018



Dat Ruiz

External Reference #: [id number]

: 1991

Sex: Male



Demographics







                          Address                   [address] [place]  [address]

 

                          Preferred Language        Unknown

 

                          Marital Status            Unknown

 

                          Scientology Affiliation     Unknown

 

                          Race                      Unknown

 

                          Ethnic Group              Unknown





Author







                          Author                    VERONICA MENDES

 

                          [facility]

 

                          Address                   [address] [place]  [address]



 

                          Phone                     [phone]







Care Team Providers







                    Care Team Member Name    Role                Phone

 

                    VERONICA MENDES     Unavailable         [phone]







PROBLEMS







          Type      Condition    ICD9-CM Code    LWD26-ID Code    Onset Dates    Condition Status    SNOMED

 Code

 

             Problem      Erectile dysfunction due to diseases classified elsewhere                 N52.1          

                          Active                    993941908

 

           Problem    Cigarette nicotine dependence without complication               F17.210               Active

                                        [id number]

 

                Problem         Diabetic polyneuropathy associated with type 1 diabetes mellitus                    E10.42

                                        Active              [id number]

 

          Problem    Diabetes              E11.9               Active    [id number]







ALLERGIES

No Information



ENCOUNTERS







                Encounter       Location        Date            Diagnosis

 

                          [facility]     [id number] [place] [address]

                                         

 

                          [facility]     [id number] [place] [address]

                                        Diabetes E11.9

 

                          [facility]     [id number] [place] [address]

                                         

 

                          [facility]     [id number] [place] [address]

                          14 May, 2018               

 

                          [facility]     [id number] [place] [address]

                                         

 

                          [facility]     [id number] [place] [address]

                                         

 

                          [facility]     [id number] [place] [address]

                          20 Dec, 2017               

 

                          [facility]     [id number] [place] [address]

                          05 Dec, 2017               

 

                          [facility]     [id number] [place] [address]

                                         

 

                          [facility]     [id number] [place] [address]

                                         

 

                          [facility]     [id number] [place] [address]

                                        Diabetes E11.9

 

                          [id number] [place] [address]

                          24 Aug, 2017              Type 1 diabetes mellitus with other neurologic complication E10.49



 

                          [facility]     [id number] [place] [address]

                          24 Aug, 2017               

 

                          [address] [address]

                          04 Aug, 2017              Diabetes E11.9 ; Erectile dysfunction due to diseases classified 

elsewhere N52.1 ; Diabetic polyneuropathy associated with type 1 diabetes 
mellitus E10.42 and Cigarette nicotine dependence without complication F17.210

 

                          [address] [place] [id number] [place] [address]

                                        Hip fracture, left, closed, with routine healing, subsequent encounter

 S72.002D and Allergy, initial encounter T78.40XA

 

                          [address] [place] [id number] [place] [address]

                                         

 

                          [address] [place] [id number] [place] [address]

                          30 May, 2017              Femur fracture, left S72.92XA

 

                          [address] [id number] [place] [address]

                          30 May, 2017              Femur fracture, left S72.92XA

 

                          [address] [place] [id number] [place] [address]

                          16 May, 2017              Diabetes E11.9 and Femur fracture, left S72.92XA

 

                          [facility] [place]    [id number] [place] [phone]

                          09 May, 2017               

 

                          [facility]    [id number] [place] [address]

                          31 May, 2016               

 

                          [facility] [place] [facility]     [address] [id number] [place] [address]

                          31 May, 2016               

 

                          [facility] [place] [facility]     [address] [id number] [place] [address]

                          14 2015               

 

                          [facility] [place] [facility]     [address] [id number] [place] [address]

                                         

 

                          [facility] [place] [facility]     [address] [id number] [place] [address]

                          24 Mar, 2015               

 

                          [facility] [place] [facility]     [address] [id number] [place] [address]

                          24 Mar, 2015               

 

                          [facility] [place] [facility]     [address] [id number] [place] [address]

                          04 Mar, 2015               

 

                          [facility] [place] [facility]     [address] [id number] [place] [address]

                          04 Mar, 2015               

 

                          [facility] [place] [facility]     [address] [id number] [place] [address]

                          04 Mar, 2015               

 

                          [facility] [place] [facility]     [address] [id number] [place] [address]

                          04 Mar, 2015               

 

                          [facility] [place] [facility]     [address] [id number] [place] [address]

                          30 Dec, 2014               

 

                          [facility] [place] [facility]     [address] [id number] [place] [address]

                          30 Dec, 2014               

 

                          [facility] [place] [facility]     [address] [id number] [place] [address]

                          18 Dec, 2014               

 

                          [facility] [place] [facility]     [address] [id number] [place] [address]

                          18 Dec, 2014               

 

                          [facility] [place] [facility]     [address] [id number] [place] [address]

                          15 Dec, 2014               

 

                          [facility] [place] [facility]     [address] [id number] [place] [address]

                          12 Dec, 2014               

 

                          [facility] [place] [facility]     [address] [id number] [address]

                          12 Dec, 2014               

 

                          [facility] [place] [facility]     [address] [id number] [place] [address]

                          24 Oct, 2014               

 

                          [facility] [place] [facility]     [address] [id number] [place] [address]

                          24 Oct, 2014               

 

                          [facility] [place] [facility]     [address] [id number] [place] [address]

                          21 Oct, 2014               

 

                          [facility] [place] [facility]     [address] [id number] [place] [address]

                          21 Oct, 2014               

 

                          [facility] [place] [facility]     [address] [id number] [place] [address]

                          16 Sep, 2014               

 

                          [facility] [place] [facility]     [address] [id number] [place] [address]

                          16 Sep, 2014               

 

                          [facility] [place] [facility]     [address] [id number] [place] [address]

                                         

 

                          [facility] [place] [facility]     [address] [id number] [place] [address]

                                         

 

                          [facility] [place] [facility]     [address] [facility] [id number] [place] [address]

                                         

 

                          [facility] [place] [facility]     [address] [facility] [id number] [place] [address]

                                         

 

                          [facility] [place] [facility]     [address] [id number] [place] [address]

                          10 [place], 2014               

 

                          [facility] [place] [facility]     [address] [id number] [place] [address]

                          10 [place], 2014               

 

                          [facility] [place] [facility]     [address] [id number] [place] [address]

                                         

 

                          [facility] [place] [facility]     [address] [id number] [place] [address]

                                         

 

                          [facility] [place] [facility]     [address] [facility] [id number] [place] [address]

                          15 2013               

 

                          [facility] [place] [facility]     [address] [id number] [place] [address]

                          14 2013               

 

                          [facility]     [address] [id number] [place] [address]

                          14 2013               

 

                          [facility] [place] [facility]     [address] [id number] [place] [address]

                                         

 

                          [facility] [place] [facility]     [address] [id number] [place] [address]

                                         

 

                          [facility] [place] [facility]     [address] [id number] [place] [address]

                                         

 

                          [facility] [place] [facility]     [address] [id number] [place] [address]

                                         

 

                          [facility] [place] [facility]     [address] [id number] [place] [address]

                          29 Oct, 2013               

 

                          [facility] [place] [facility]     [address] [id number] [place] [address]

                          29 Oct, 2013               

 

                          [facility] [place] [facility]     [address] [id number] [place] [address]

                          13 Aug, 2013               

 

                          [facility] [place] [facility]     [address] [id number] [place] [address]

                          23 May, 2013               

 

                          [facility] [place] [facility]     [address] [id number] [place] [address]

                                         

 

                          [facility] [place] [facility]     [address] [id number] [place] [address]

                                         

 

                          [facility] [place] [facility]     [address] [id number] [place] [address]

                          12 Mar, 2013               

 

                          [facility] [place] [facility]     [address] [id number] [place] [address]

                          05 Mar, 2013               

 

                          [facility] [place] [facility]     [address] [id number] [place] [address]

                          16 2012               

 

                          [facility] [place] [facility]     [address] [id number] [place] [address]

                                         

 

                          [facility]     [id number] [address]

                                         

 

                          [facility]     [id number] [address]

                          18 Dec, 2009               

 

                          [facility]     [address] [id number] [address]

                                         

 

                          [facility]     [id number] [address]

                          26 Oct, 2009               

 

                          [facility]     [id number] [address]

                          23 Oct, 2009               

 

                          [facility]     [id number] [address]

                          14 Sep, 2009               







IMMUNIZATIONS

No Known Immunizations



SOCIAL HISTORY

Never Assessed



REASON FOR VISIT

upload request



PLAN OF CARE





VITAL SIGNS





MEDICATIONS

Unknown Medications



RESULTS

No Results



PROCEDURES

No Known procedures



INSTRUCTIONS





MEDICATIONS ADMINISTERED

No Known Medications



MEDICAL (GENERAL) HISTORY







                    Type                Description         Date

 

                    Medical History     Diabetes             

 

                    Surgical History    Left hip surgery    2017

 

                    Surgical History    tonsilectomy         

 

                    Hospitalization History     [facility]     Statement Selected

## 2021-07-14 NOTE — ASU DISCHARGE PLAN (ADULT/PEDIATRIC) - CARE PROVIDER_API CALL
Paul Smith)  Urology  [address] [place] [address], [place] [address]  Phone: [phone]  Fax: [phone]  Follow Up Time: Routine

## 2021-07-14 NOTE — ASU PATIENT PROFILE, ADULT - PSH
H/O right nephrectomy    History of bilateral cataract extraction    S/P ureteral stent placement  many times

## 2021-07-14 NOTE — ASU DISCHARGE PLAN (ADULT/PEDIATRIC) - ASU DC SPECIAL INSTRUCTIONSFT
Resume home medications as instructed by prescriptions; a prescription for Keflex antibiotics was sent to your pharmacy, please take as prescribed.     Please follow up with Dr. Smith in 6 months to plan future stent exchange. Please call to make an appointment.     Follow-up with primary care doctor as well.     Call 911 and return to the ED for chest pain, shortness of breath, significant increase in pain, or significant change in color of surgical sites.

## 2021-07-14 NOTE — BRIEF OPERATIVE NOTE - NSICDXBRIEFPROCEDURE_GEN_ALL_CORE_FT
PROCEDURES:  Cystoscopy with replacement of left ureteral stent 14-Jul-2021 08:12:04  Ashley Barnes

## 2021-07-15 NOTE — H&P PST ADULT - DOCUMENT STATUS
[facility]    Brief Operative Note    Pre-operative diagnosis: Right knee OA  Post-operative diagnosis same  Procedure: RIGHT TOTAL KNEE ARTHROPLASTY  Surgeon(s) and Role:     * Migeul Buckley MD - Primary     * Dustin Yusuf PA-C - Assisting  Anesthesia: Spinal   Estimated blood loss: 100 ml  Drains:  None  Specimens: None  Findings:  Advanced OA  Complications: None    Plan: DC home POD1 w/family assist.  DVT prophylaxis w/ASA 325mg QD x6wks.        Implants:   Implant Name Type Inv. Item Serial No.  Lot No. LRB No. Used Action   BONE CEMENT RADIOPAQUE SIMPLEX HV FULL DOSE 6194-1-001 - [id number] Cement, Bone BONE CEMENT RADIOPAQUE SIMPLEX HV FULL DOSE 6194-1-001  [employer] [id number] Right 2 Implanted   IMP PEG DISTAL FEMORAL STRK 5575-X-000 - [id number] Total Joint Component/Insert IMP PEG DISTAL FEMORAL STRK 5575-X-000  [employer] [id number] Right 1 Implanted   IMP COMP FEM STRK TRIATHLN PS RT 7 5515-F-702 - [id number] Total Joint Component/Insert IMP COMP FEM STRK TRIATHLN PS RT 7 5515-F-702  [employer] [id number] Right 1 Implanted   IMP COMP PATELLA HOWM TRI 38 10MM 5551-L-381 - [id number] Total Joint Component/Insert IMP COMP PATELLA HOWM TRI 38 10MM 5551-L-381  [employer] [id number] Right 1 Implanted   IMP BASEPLATE TIBIAL STRK TRI SZ 6 5521-B-600 - [id number] Total Joint Component/Insert IMP BASEPLATE TIBIAL STRK TRI SZ 6 5521-B-600  [employer] [facility] [id number] Right 1 Implanted   IMP INSERT TIBIAL HOWM TRI SIZE 6 11MM 5532-G-611 - [id number] Total Joint Component/Insert IMP INSERT TIBIAL HOWM TRI SIZE 6 11MM 5532-G-611  [employer] [id number] Right 1 Implanted            Authored by Resident/PA/NP

## 2021-07-26 LAB — NON-GYNECOLOGICAL CYTOLOGY STUDY: SIGNIFICANT CHANGE UP

## 2021-08-05 ENCOUNTER — NON-APPOINTMENT (OUTPATIENT)
Age: 71
End: 2021-08-05

## 2021-10-14 ENCOUNTER — APPOINTMENT (OUTPATIENT)
Dept: UROLOGY | Facility: CLINIC | Age: 71
End: 2021-10-14
Payer: MEDICARE

## 2021-10-14 ENCOUNTER — OUTPATIENT (OUTPATIENT)
Dept: OUTPATIENT SERVICES | Facility: HOSPITAL | Age: 71
LOS: 1 days | End: 2021-10-14
Payer: MEDICARE

## 2021-10-14 VITALS
TEMPERATURE: 97.5 F | HEART RATE: 96 BPM | RESPIRATION RATE: 17 BRPM | OXYGEN SATURATION: 100 % | SYSTOLIC BLOOD PRESSURE: 150 MMHG | DIASTOLIC BLOOD PRESSURE: 71 MMHG

## 2021-10-14 DIAGNOSIS — Z90.5 ACQUIRED ABSENCE OF KIDNEY: Chronic | ICD-10-CM

## 2021-10-14 DIAGNOSIS — Z96.0 PRESENCE OF UROGENITAL IMPLANTS: Chronic | ICD-10-CM

## 2021-10-14 DIAGNOSIS — Z00.00 ENCOUNTER FOR GENERAL ADULT MEDICAL EXAMINATION WITHOUT ABNORMAL FINDINGS: ICD-10-CM

## 2021-10-14 DIAGNOSIS — Z98.41 CATARACT EXTRACTION STATUS, RIGHT EYE: Chronic | ICD-10-CM

## 2021-10-14 DIAGNOSIS — R35.0 FREQUENCY OF MICTURITION: ICD-10-CM

## 2021-10-14 DIAGNOSIS — C67.9 MALIGNANT NEOPLASM OF BLADDER, UNSPECIFIED: ICD-10-CM

## 2021-10-14 PROCEDURE — 52000 CYSTOURETHROSCOPY: CPT

## 2021-10-14 PROCEDURE — 88112 CYTOPATH CELL ENHANCE TECH: CPT | Mod: 26

## 2021-10-20 LAB — URINE CYTOLOGY: NORMAL

## 2022-01-20 ENCOUNTER — APPOINTMENT (OUTPATIENT)
Dept: UROLOGY | Facility: CLINIC | Age: 72
End: 2022-01-20
Payer: MEDICARE

## 2022-01-20 ENCOUNTER — APPOINTMENT (OUTPATIENT)
Dept: UROLOGY | Facility: CLINIC | Age: 72
End: 2022-01-20

## 2022-01-20 ENCOUNTER — OUTPATIENT (OUTPATIENT)
Dept: OUTPATIENT SERVICES | Facility: HOSPITAL | Age: 72
LOS: 1 days | End: 2022-01-20
Payer: MEDICARE

## 2022-01-20 VITALS
BODY MASS INDEX: 22.53 KG/M2 | TEMPERATURE: 98 F | OXYGEN SATURATION: 99 % | HEART RATE: 98 BPM | RESPIRATION RATE: 17 BRPM | HEIGHT: 64 IN | WEIGHT: 132 LBS | DIASTOLIC BLOOD PRESSURE: 72 MMHG | SYSTOLIC BLOOD PRESSURE: 148 MMHG

## 2022-01-20 DIAGNOSIS — Z90.5 ACQUIRED ABSENCE OF KIDNEY: Chronic | ICD-10-CM

## 2022-01-20 DIAGNOSIS — Z98.41 CATARACT EXTRACTION STATUS, RIGHT EYE: Chronic | ICD-10-CM

## 2022-01-20 DIAGNOSIS — R35.0 FREQUENCY OF MICTURITION: ICD-10-CM

## 2022-01-20 DIAGNOSIS — Z96.0 PRESENCE OF UROGENITAL IMPLANTS: Chronic | ICD-10-CM

## 2022-01-20 PROCEDURE — 52000 CYSTOURETHROSCOPY: CPT

## 2022-01-20 PROCEDURE — 88112 CYTOPATH CELL ENHANCE TECH: CPT | Mod: 26

## 2022-01-22 LAB — URINE CYTOLOGY: NORMAL

## 2022-01-24 DIAGNOSIS — C67.9 MALIGNANT NEOPLASM OF BLADDER, UNSPECIFIED: ICD-10-CM

## 2022-01-24 LAB — BACTERIA UR CULT: ABNORMAL

## 2022-01-25 ENCOUNTER — NON-APPOINTMENT (OUTPATIENT)
Age: 72
End: 2022-01-25

## 2022-02-16 ENCOUNTER — OUTPATIENT (OUTPATIENT)
Dept: OUTPATIENT SERVICES | Facility: HOSPITAL | Age: 72
LOS: 1 days | End: 2022-02-16
Payer: MEDICARE

## 2022-02-16 VITALS
HEART RATE: 72 BPM | SYSTOLIC BLOOD PRESSURE: 158 MMHG | WEIGHT: 134.92 LBS | HEIGHT: 64 IN | DIASTOLIC BLOOD PRESSURE: 73 MMHG | RESPIRATION RATE: 18 BRPM | TEMPERATURE: 97 F | OXYGEN SATURATION: 99 %

## 2022-02-16 DIAGNOSIS — C67.9 MALIGNANT NEOPLASM OF BLADDER, UNSPECIFIED: ICD-10-CM

## 2022-02-16 DIAGNOSIS — Z96.0 PRESENCE OF UROGENITAL IMPLANTS: Chronic | ICD-10-CM

## 2022-02-16 DIAGNOSIS — Z98.890 OTHER SPECIFIED POSTPROCEDURAL STATES: Chronic | ICD-10-CM

## 2022-02-16 DIAGNOSIS — N13.30 UNSPECIFIED HYDRONEPHROSIS: ICD-10-CM

## 2022-02-16 DIAGNOSIS — Z98.41 CATARACT EXTRACTION STATUS, RIGHT EYE: Chronic | ICD-10-CM

## 2022-02-16 DIAGNOSIS — Z00.00 ENCOUNTER FOR GENERAL ADULT MEDICAL EXAMINATION WITHOUT ABNORMAL FINDINGS: ICD-10-CM

## 2022-02-16 DIAGNOSIS — Z01.818 ENCOUNTER FOR OTHER PREPROCEDURAL EXAMINATION: ICD-10-CM

## 2022-02-16 DIAGNOSIS — Z90.5 ACQUIRED ABSENCE OF KIDNEY: Chronic | ICD-10-CM

## 2022-02-16 DIAGNOSIS — N40.1 BENIGN PROSTATIC HYPERPLASIA WITH LOWER URINARY TRACT SYMPTOMS: ICD-10-CM

## 2022-02-16 PROCEDURE — 85027 COMPLETE CBC AUTOMATED: CPT

## 2022-02-16 PROCEDURE — G0463: CPT

## 2022-02-16 PROCEDURE — 80048 BASIC METABOLIC PNL TOTAL CA: CPT

## 2022-02-16 PROCEDURE — 36415 COLL VENOUS BLD VENIPUNCTURE: CPT

## 2022-02-16 PROCEDURE — 87086 URINE CULTURE/COLONY COUNT: CPT

## 2022-02-16 RX ORDER — CEFAZOLIN SODIUM 1 G
3000 VIAL (EA) INJECTION ONCE
Refills: 0 | Status: DISCONTINUED | OUTPATIENT
Start: 2022-03-09 | End: 2022-03-23

## 2022-02-16 RX ORDER — LIDOCAINE HCL 20 MG/ML
0.2 VIAL (ML) INJECTION ONCE
Refills: 0 | Status: DISCONTINUED | OUTPATIENT
Start: 2022-03-09 | End: 2022-03-23

## 2022-02-16 RX ORDER — SODIUM CHLORIDE 9 MG/ML
1000 INJECTION, SOLUTION INTRAVENOUS
Refills: 0 | Status: DISCONTINUED | OUTPATIENT
Start: 2022-03-09 | End: 2022-03-23

## 2022-02-16 NOTE — H&P PST ADULT - HISTORY OF PRESENT ILLNESS
71 yo male with PMH of emphysema smokers cough HTN, right urothelial carcinoma s/p right nephroureterectomy and recurrent bladder cancer s/p TURBT with left renal hydronephrosis  requiring ureteral stent change every 5-6 months,  last stent change was 6/2021. Denies hematuria, urinary hesitancy dysuria or pelvic discomfort. Pt presented to PST for left ureteral stent exchange/ cystoscopy on 3/09/2022. Pt denies chest pain, dyspnea, nausea, vomiting.    Note; covid test 3/6/2022 [facility]

## 2022-02-16 NOTE — H&P PST ADULT - ATTENDING COMMENTS
Patient with left ureteral obstruction. Patient wishes to proceed with left stent exchange, proceed as indicated. Informed consent was obtained. Alternatives were given. Risks including but not limited to bleeding, infection, risk of anesthesia, pain, failure to cure, stone migration, need for future procedure, and injury to surrounding structures were discussed. Patient wishes to proceed with procedure.

## 2022-02-16 NOTE — H&P PST ADULT - NSICDXPASTMEDICALHX_GEN_ALL_CORE_FT
PAST MEDICAL HISTORY:  Arthritis     Bladder cancer x 20 yrs    Current every day smoker     Emphysema lung diagnosed ct scan 2020    Enlarged prostate     HTN (hypertension)     Prediabetes     Right inguinal hernia     Ureter cancer, right 2017 no chemo

## 2022-02-16 NOTE — H&P PST ADULT - NSICDXPASTSURGICALHX_GEN_ALL_CORE_FT
PAST SURGICAL HISTORY:  H/O right nephrectomy 2017    History of bilateral cataract extraction     History of prostate surgery laser with cystoscopy    S/P ureteral stent placement many times

## 2022-02-22 PROBLEM — C66.1 MALIGNANT NEOPLASM OF RIGHT URETER: Chronic | Status: ACTIVE | Noted: 2019-10-17

## 2022-03-06 ENCOUNTER — OUTPATIENT (OUTPATIENT)
Dept: OUTPATIENT SERVICES | Facility: HOSPITAL | Age: 72
LOS: 1 days | End: 2022-03-06
Payer: MEDICARE

## 2022-03-06 DIAGNOSIS — Z11.52 ENCOUNTER FOR SCREENING FOR COVID-19: ICD-10-CM

## 2022-03-06 DIAGNOSIS — Z98.41 CATARACT EXTRACTION STATUS, RIGHT EYE: Chronic | ICD-10-CM

## 2022-03-06 DIAGNOSIS — Z90.5 ACQUIRED ABSENCE OF KIDNEY: Chronic | ICD-10-CM

## 2022-03-06 DIAGNOSIS — Z98.890 OTHER SPECIFIED POSTPROCEDURAL STATES: Chronic | ICD-10-CM

## 2022-03-06 DIAGNOSIS — Z96.0 PRESENCE OF UROGENITAL IMPLANTS: Chronic | ICD-10-CM

## 2022-03-06 LAB — SARS-COV-2 RNA SPEC QL NAA+PROBE: SIGNIFICANT CHANGE UP

## 2022-03-06 PROCEDURE — C9803: CPT

## 2022-03-06 PROCEDURE — U0003: CPT

## 2022-03-06 PROCEDURE — U0005: CPT

## 2022-03-08 ENCOUNTER — TRANSCRIPTION ENCOUNTER (OUTPATIENT)
Age: 72
End: 2022-03-08

## 2022-03-09 ENCOUNTER — APPOINTMENT (OUTPATIENT)
Dept: UROLOGY | Facility: HOSPITAL | Age: 72
End: 2022-03-09

## 2022-03-09 ENCOUNTER — OUTPATIENT (OUTPATIENT)
Dept: OUTPATIENT SERVICES | Facility: HOSPITAL | Age: 72
LOS: 1 days | End: 2022-03-09
Payer: MEDICARE

## 2022-03-09 VITALS
SYSTOLIC BLOOD PRESSURE: 110 MMHG | WEIGHT: 134.92 LBS | OXYGEN SATURATION: 98 % | HEIGHT: 64 IN | RESPIRATION RATE: 14 BRPM | TEMPERATURE: 98 F | DIASTOLIC BLOOD PRESSURE: 60 MMHG | HEART RATE: 88 BPM

## 2022-03-09 VITALS
HEART RATE: 78 BPM | SYSTOLIC BLOOD PRESSURE: 140 MMHG | OXYGEN SATURATION: 97 % | TEMPERATURE: 97 F | DIASTOLIC BLOOD PRESSURE: 72 MMHG | RESPIRATION RATE: 16 BRPM

## 2022-03-09 DIAGNOSIS — N13.30 UNSPECIFIED HYDRONEPHROSIS: ICD-10-CM

## 2022-03-09 DIAGNOSIS — N40.1 BENIGN PROSTATIC HYPERPLASIA WITH LOWER URINARY TRACT SYMPTOMS: ICD-10-CM

## 2022-03-09 DIAGNOSIS — Z98.890 OTHER SPECIFIED POSTPROCEDURAL STATES: Chronic | ICD-10-CM

## 2022-03-09 DIAGNOSIS — Z96.0 PRESENCE OF UROGENITAL IMPLANTS: Chronic | ICD-10-CM

## 2022-03-09 DIAGNOSIS — Z00.00 ENCOUNTER FOR GENERAL ADULT MEDICAL EXAMINATION WITHOUT ABNORMAL FINDINGS: ICD-10-CM

## 2022-03-09 DIAGNOSIS — C67.9 MALIGNANT NEOPLASM OF BLADDER, UNSPECIFIED: ICD-10-CM

## 2022-03-09 DIAGNOSIS — Z01.818 ENCOUNTER FOR OTHER PREPROCEDURAL EXAMINATION: ICD-10-CM

## 2022-03-09 DIAGNOSIS — Z98.41 CATARACT EXTRACTION STATUS, RIGHT EYE: Chronic | ICD-10-CM

## 2022-03-09 DIAGNOSIS — Z90.5 ACQUIRED ABSENCE OF KIDNEY: Chronic | ICD-10-CM

## 2022-03-09 PROCEDURE — 74420 UROGRAPHY RTRGR +-KUB: CPT | Mod: 26

## 2022-03-09 PROCEDURE — 52332 CYSTOSCOPY AND TREATMENT: CPT | Mod: LT

## 2022-03-09 PROCEDURE — C1769: CPT

## 2022-03-09 PROCEDURE — 76000 FLUOROSCOPY <1 HR PHYS/QHP: CPT

## 2022-03-09 PROCEDURE — C2617: CPT

## 2022-03-09 PROCEDURE — C1758: CPT

## 2022-03-09 DEVICE — IMPLANTABLE DEVICE: Type: IMPLANTABLE DEVICE | Site: LEFT | Status: FUNCTIONAL

## 2022-03-09 DEVICE — STENT URET INLAY OPTIMA 6FRX26CM: Type: IMPLANTABLE DEVICE | Site: LEFT | Status: FUNCTIONAL

## 2022-03-09 DEVICE — GUIDEWIRE SENSOR DUAL-FLEX NITINOL STRAIGHT .035" X 150CM: Type: IMPLANTABLE DEVICE | Site: LEFT | Status: FUNCTIONAL

## 2022-03-09 DEVICE — URETERAL CATH OPEN END 5FR 70CM: Type: IMPLANTABLE DEVICE | Site: LEFT | Status: FUNCTIONAL

## 2022-03-09 RX ORDER — FENTANYL CITRATE 50 UG/ML
25 INJECTION INTRAVENOUS
Refills: 0 | Status: DISCONTINUED | OUTPATIENT
Start: 2022-03-09 | End: 2022-03-09

## 2022-03-09 RX ORDER — ONDANSETRON 8 MG/1
4 TABLET, FILM COATED ORAL ONCE
Refills: 0 | Status: DISCONTINUED | OUTPATIENT
Start: 2022-03-09 | End: 2022-03-23

## 2022-03-09 RX ORDER — OXYCODONE HYDROCHLORIDE 5 MG/1
5 TABLET ORAL ONCE
Refills: 0 | Status: DISCONTINUED | OUTPATIENT
Start: 2022-03-09 | End: 2022-03-09

## 2022-03-09 RX ADMIN — SODIUM CHLORIDE 100 MILLILITER(S): 9 INJECTION, SOLUTION INTRAVENOUS at 08:44

## 2022-03-09 NOTE — ASU DISCHARGE PLAN (ADULT/PEDIATRIC) - CARE PROVIDER_API CALL
Paul Smith)  Urology  [address] [place] [address], [place] [address]  Phone: [phone]  Fax: [phone]  Follow Up Time: Routine

## 2022-03-09 NOTE — ASU DISCHARGE PLAN (ADULT/PEDIATRIC) - NS MD DC FALL RISK RISK
For information on Fall & Injury Prevention, visit: [email or web address] OR  [email or web address] OR  https://www.cdc.gov/steadi/patient.html

## 2022-03-09 NOTE — ASU DISCHARGE PLAN (ADULT/PEDIATRIC) - NURSING INSTRUCTIONS
Next dose of Tylenol will be on or after 4pm ,today/tonight, If needed for pain/cramps. Your first dose of Tylenol was given at 10am . Do not exceed more than 4000mg of Tylenol in one 24 hour setting.

## 2022-03-09 NOTE — PRE-ANESTHESIA EVALUATION ADULT - NSANTHPMHFT_GEN_ALL_CORE
Emphysema Dx's on CT scan, pt. reports no symptoms. Able to walk long distances & climb stairs without a problem

## 2022-05-09 ENCOUNTER — APPOINTMENT (OUTPATIENT)
Dept: RADIOLOGY | Facility: IMAGING CENTER | Age: 72
End: 2022-05-09
Payer: MEDICARE

## 2022-05-09 ENCOUNTER — APPOINTMENT (OUTPATIENT)
Dept: UROLOGY | Facility: CLINIC | Age: 72
End: 2022-05-09
Payer: MEDICARE

## 2022-05-09 ENCOUNTER — OUTPATIENT (OUTPATIENT)
Dept: OUTPATIENT SERVICES | Facility: HOSPITAL | Age: 72
LOS: 1 days | End: 2022-05-09

## 2022-05-09 ENCOUNTER — OUTPATIENT (OUTPATIENT)
Dept: OUTPATIENT SERVICES | Facility: HOSPITAL | Age: 72
LOS: 1 days | End: 2022-05-09
Payer: MEDICARE

## 2022-05-09 ENCOUNTER — APPOINTMENT (OUTPATIENT)
Dept: UROLOGY | Facility: CLINIC | Age: 72
End: 2022-05-09

## 2022-05-09 DIAGNOSIS — Z98.890 OTHER SPECIFIED POSTPROCEDURAL STATES: Chronic | ICD-10-CM

## 2022-05-09 DIAGNOSIS — Z90.5 ACQUIRED ABSENCE OF KIDNEY: Chronic | ICD-10-CM

## 2022-05-09 DIAGNOSIS — Z96.0 PRESENCE OF UROGENITAL IMPLANTS: Chronic | ICD-10-CM

## 2022-05-09 DIAGNOSIS — N40.1 BENIGN PROSTATIC HYPERPLASIA WITH LOWER URINARY TRACT SYMPTOMS: ICD-10-CM

## 2022-05-09 DIAGNOSIS — Z98.41 CATARACT EXTRACTION STATUS, RIGHT EYE: Chronic | ICD-10-CM

## 2022-05-09 DIAGNOSIS — C67.9 MALIGNANT NEOPLASM OF BLADDER, UNSPECIFIED: ICD-10-CM

## 2022-05-09 DIAGNOSIS — N13.8 OTHER OBSTRUCTIVE AND REFLUX UROPATHY: ICD-10-CM

## 2022-05-09 DIAGNOSIS — R35.0 FREQUENCY OF MICTURITION: ICD-10-CM

## 2022-05-09 DIAGNOSIS — N13.30 UNSPECIFIED HYDRONEPHROSIS: ICD-10-CM

## 2022-05-09 PROCEDURE — 74018 RADEX ABDOMEN 1 VIEW: CPT | Mod: 26

## 2022-05-09 PROCEDURE — 74018 RADEX ABDOMEN 1 VIEW: CPT

## 2022-05-09 PROCEDURE — 52000 CYSTOURETHROSCOPY: CPT

## 2022-05-09 PROCEDURE — 99213 OFFICE O/P EST LOW 20 MIN: CPT | Mod: 25

## 2022-05-09 PROCEDURE — 88112 CYTOPATH CELL ENHANCE TECH: CPT | Mod: 26

## 2022-05-10 LAB
ANION GAP SERPL CALC-SCNC: 13 MMOL/L
BUN SERPL-MCNC: 38 MG/DL
CALCIUM SERPL-MCNC: 8.1 MG/DL
CHLORIDE SERPL-SCNC: 103 MMOL/L
CO2 SERPL-SCNC: 26 MMOL/L
CREAT SERPL-MCNC: 1.87 MG/DL
EGFR: 37 ML/MIN/1.73M2
GLUCOSE SERPL-MCNC: 130 MG/DL
POTASSIUM SERPL-SCNC: 4.6 MMOL/L
PSA FREE FLD-MCNC: 22 %
PSA FREE SERPL-MCNC: 0.1 NG/ML
PSA SERPL-MCNC: 0.46 NG/ML
SODIUM SERPL-SCNC: 142 MMOL/L
URINE CYTOLOGY: NORMAL

## 2022-09-08 ENCOUNTER — APPOINTMENT (OUTPATIENT)
Dept: UROLOGY | Facility: CLINIC | Age: 72
End: 2022-09-08

## 2022-09-08 ENCOUNTER — OUTPATIENT (OUTPATIENT)
Dept: OUTPATIENT SERVICES | Facility: HOSPITAL | Age: 72
LOS: 1 days | End: 2022-09-08
Payer: MEDICARE

## 2022-09-08 DIAGNOSIS — Z90.5 ACQUIRED ABSENCE OF KIDNEY: Chronic | ICD-10-CM

## 2022-09-08 DIAGNOSIS — Z98.41 CATARACT EXTRACTION STATUS, RIGHT EYE: Chronic | ICD-10-CM

## 2022-09-08 DIAGNOSIS — Z96.0 PRESENCE OF UROGENITAL IMPLANTS: Chronic | ICD-10-CM

## 2022-09-08 DIAGNOSIS — Z98.890 OTHER SPECIFIED POSTPROCEDURAL STATES: Chronic | ICD-10-CM

## 2022-09-08 DIAGNOSIS — R35.0 FREQUENCY OF MICTURITION: ICD-10-CM

## 2022-09-08 PROCEDURE — 52000 CYSTOURETHROSCOPY: CPT

## 2022-09-08 PROCEDURE — 99213 OFFICE O/P EST LOW 20 MIN: CPT | Mod: 25

## 2022-09-08 PROCEDURE — 88112 CYTOPATH CELL ENHANCE TECH: CPT | Mod: 26

## 2022-09-11 LAB
ANION GAP SERPL CALC-SCNC: 16 MMOL/L
BACTERIA UR CULT: ABNORMAL
BASOPHILS # BLD AUTO: 0.03 K/UL
BASOPHILS NFR BLD AUTO: 0.3 %
BUN SERPL-MCNC: 48 MG/DL
CALCIUM SERPL-MCNC: 8.2 MG/DL
CHLORIDE SERPL-SCNC: 100 MMOL/L
CO2 SERPL-SCNC: 23 MMOL/L
CREAT SERPL-MCNC: 2.1 MG/DL
EGFR: 32 ML/MIN/1.73M2
EOSINOPHIL # BLD AUTO: 0.19 K/UL
EOSINOPHIL NFR BLD AUTO: 1.8 %
GLUCOSE SERPL-MCNC: 99 MG/DL
HCT VFR BLD CALC: 39.5 %
HGB BLD-MCNC: 12.7 G/DL
IMM GRANULOCYTES NFR BLD AUTO: 0.2 %
LYMPHOCYTES # BLD AUTO: 1.74 K/UL
LYMPHOCYTES NFR BLD AUTO: 16.7 %
MAN DIFF?: NORMAL
MCHC RBC-ENTMCNC: 29.2 PG
MCHC RBC-ENTMCNC: 32.2 GM/DL
MCV RBC AUTO: 90.8 FL
MONOCYTES # BLD AUTO: 1 K/UL
MONOCYTES NFR BLD AUTO: 9.6 %
NEUTROPHILS # BLD AUTO: 7.46 K/UL
NEUTROPHILS NFR BLD AUTO: 71.4 %
PLATELET # BLD AUTO: 312 K/UL
POTASSIUM SERPL-SCNC: 4.8 MMOL/L
PSA FREE FLD-MCNC: 30 %
PSA FREE SERPL-MCNC: 0.75 NG/ML
PSA SERPL-MCNC: 2.54 NG/ML
RBC # BLD: 4.35 M/UL
RBC # FLD: 13.7 %
SODIUM SERPL-SCNC: 139 MMOL/L
URINE CYTOLOGY: NORMAL
WBC # FLD AUTO: 10.44 K/UL

## 2022-09-12 NOTE — ADDENDUM
[FreeTextEntry1] : Entered by TAN GUERRERO, acting as scribe for Dr. Paul Smith.\par The documentation recorded by the scribe accurately reflects the service I personally performed and the decisions made by me.

## 2022-09-12 NOTE — LETTER BODY
[FreeTextEntry1] : Kalin OBREGON Leventhal DO\par [address]\[place] [address]\par [phone]\par \par Dear Dr. Leventhal,\par \par Reason for visit: Bladder cancer s/p TURBT. Left renal obstruction. \par \par This is a 72 year old gentleman with previous right urothelial carcinoma s/p right nephroureterectomy and recurrent bladder cancer s/p TURBT presenting with left renal obstruction. The patient previously experienced a diffuse pruritic rash on arms and legs with Mitomycin bladder instillation. The patient returns today for interval surveillance cystoscopy. Since he was last seen, the patient reports doing well. He has no urinary complaints or difficulties. The patient is overall well. He denies any changes in health. The past medical history and family history and social history are unchanged. All other review of systems are negative. Patient denies any changes in medications. Medication list was reconciled.\par \par On examination, the patient is an older-appearing gentleman in no acute distress. He is alert and oriented follows commands. He has normal mood and affect. He is normocephalic. Neck is supple. Oral no thrush Respirations are unlabored. His abdomen is soft and nontender. Bladder is nonpalpable. No CVA tenderness. Neurologically he is grossly intact. No peripheral edema. Skin without gross abnormality. He has normal male external genitalia. Normal meatus. Bilateral testes are descended intrascrotally and normal to palpation\par  \par His cystoscopy today was unremarkable.\par \par Assessment: Bladder cancer, s/p TURBT. Left ureteral obstruction. Elevated creatinine, increased. \par \par I counseled the patient. Patient's last stent exchange was 6 months ago. Patient will proceed with left stent exchange for his left ureteral obstruction. I counseled the patient regarding the procedure. The risks and benefits were discussed. Alternatives were given. I answered the patient questions. The patient will take the necessary preparations for the procedure, including activated partial thromboplastin time, BMP, CBC w/ DIFF, culture, prothrombin Time and INR. In terms of his previous bladder cancer, his cystoscopy today was negative for bladder cancer. I reassured the patient. The patient will follow up in 6 months with surveillance cystoscopy. Risks and alternatives were discussed. I answered the patient questions. The patient will follow-up as directed and will contact me with any questions or concerns. Thank you for the opportunity to participate in the care of Mr. AHMADI. I will keep you updated on his progress.\par \par Plan: Left stent exchange. Preop labs. Follow up in 6 months for cystoscopy.

## 2022-09-12 NOTE — HISTORY OF PRESENT ILLNESS
[FreeTextEntry1] : Follow-up bladder cancer.  Patient had unremarkable cystoscopy today.\par \par Patient's last stent exchange was 6 months ago.  Patient will proceed with left stent exchange for his left ureteral obstruction.\par \par Preop labs.  Left stent exchange\par \par Follow-up in 6 months with surveillance cystoscopy..\par \par Please refer to URO Consult note

## 2022-09-14 ENCOUNTER — OUTPATIENT (OUTPATIENT)
Dept: OUTPATIENT SERVICES | Facility: HOSPITAL | Age: 72
LOS: 1 days | End: 2022-09-14
Payer: MEDICARE

## 2022-09-14 VITALS
WEIGHT: 130.07 LBS | HEART RATE: 80 BPM | TEMPERATURE: 98 F | OXYGEN SATURATION: 97 % | HEIGHT: 63 IN | SYSTOLIC BLOOD PRESSURE: 154 MMHG | RESPIRATION RATE: 16 BRPM | DIASTOLIC BLOOD PRESSURE: 66 MMHG

## 2022-09-14 DIAGNOSIS — Z00.00 ENCOUNTER FOR GENERAL ADULT MEDICAL EXAMINATION WITHOUT ABNORMAL FINDINGS: ICD-10-CM

## 2022-09-14 DIAGNOSIS — Z90.5 ACQUIRED ABSENCE OF KIDNEY: Chronic | ICD-10-CM

## 2022-09-14 DIAGNOSIS — C67.9 MALIGNANT NEOPLASM OF BLADDER, UNSPECIFIED: ICD-10-CM

## 2022-09-14 DIAGNOSIS — N40.1 BENIGN PROSTATIC HYPERPLASIA WITH LOWER URINARY TRACT SYMPTOMS: ICD-10-CM

## 2022-09-14 DIAGNOSIS — Z01.818 ENCOUNTER FOR OTHER PREPROCEDURAL EXAMINATION: ICD-10-CM

## 2022-09-14 DIAGNOSIS — N13.30 UNSPECIFIED HYDRONEPHROSIS: ICD-10-CM

## 2022-09-14 DIAGNOSIS — Z98.41 CATARACT EXTRACTION STATUS, RIGHT EYE: Chronic | ICD-10-CM

## 2022-09-14 DIAGNOSIS — Z98.890 OTHER SPECIFIED POSTPROCEDURAL STATES: Chronic | ICD-10-CM

## 2022-09-14 DIAGNOSIS — Z96.0 PRESENCE OF UROGENITAL IMPLANTS: Chronic | ICD-10-CM

## 2022-09-14 PROCEDURE — G0463: CPT

## 2022-09-14 PROCEDURE — 87086 URINE CULTURE/COLONY COUNT: CPT

## 2022-09-14 RX ORDER — SODIUM CHLORIDE 9 MG/ML
3 INJECTION INTRAMUSCULAR; INTRAVENOUS; SUBCUTANEOUS EVERY 8 HOURS
Refills: 0 | Status: DISCONTINUED | OUTPATIENT
Start: 2022-09-28 | End: 2022-10-12

## 2022-09-14 NOTE — H&P PST ADULT - PROBLEM SELECTOR PLAN 1
LEFT STENT EXCHANGE  Pre-op education provided - all questions answered   CBC & BMP results from 9/8/22 in [facility]/paper chart  Ucx sent in PST  Continue amlodipine DOS w/small sips of water

## 2022-09-14 NOTE — H&P PST ADULT - NSICDXPASTSURGICALHX_GEN_ALL_CORE_FT
PAST SURGICAL HISTORY:  H/O right nephrectomy 2017    History of bilateral cataract extraction     History of prostate surgery laser with cystoscopy    S/P ureteral stent placement many times - q6 month exchange     PAST SURGICAL HISTORY:  H/O hand surgery right    H/O right nephrectomy 2017    History of bilateral cataract extraction     History of prostate surgery laser with cystoscopy    S/P ureteral stent placement many times - q6 month exchange

## 2022-09-14 NOTE — H&P PST ADULT - NSICDXPASTMEDICALHX_GEN_ALL_CORE_FT
PAST MEDICAL HISTORY:  Anemia     Arthritis     Bladder cancer x 20 yrs    Current every day smoker     Emphysema lung diagnosed ct scan 2020    Enlarged prostate     HTN (hypertension)     Prediabetes     Right inguinal hernia     Ureter cancer, right 2017 no chemo

## 2022-09-14 NOTE — H&P PST ADULT - ACTIVITY
walks daily 1-2 miles, climb 2-3 flights of stairs, do moderate household activities without any dyspnea or chest pain

## 2022-09-14 NOTE — H&P PST ADULT - PRIMARY CARE PROVIDER
Dr. Star Adler [phone] preop eval 8/27/22 Dr. Star Adler [phone] physical exam 8/27/22, phone f/u to review results 8/27/22

## 2022-09-14 NOTE — H&P PST ADULT - ATTENDING COMMENTS
Patient with left ureteral obstruction. Patient wishes to proceed with left stent exchange. Informed consent was obtained. Alternatives were given. Risks including but not limited to bleeding, infection, risk of anesthesia, pain, failure to cure, need for future procedure, and injury to surrounding structures were discussed. Patient wishes to proceed with procedure.

## 2022-09-14 NOTE — H&P PST ADULT - HISTORY OF PRESENT ILLNESS
73 yo male with PMH of emphysema smokers cough HTN, right urothelial carcinoma s/p right nephroureterectomy and recurrent bladder cancer s/p TURBT with left renal hydronephrosis  requiring ureteral stent change every 5-6 months,  last stent change was 6/2021. Denies hematuria, urinary hesitancy dysuria or pelvic discomfort. Pt presented to PST for left ureteral stent exchange/ cystoscopy on 3/09/2022. Pt denies chest pain, dyspnea, nausea, vomiting.    Note; covid test 3/6/2022 [facility]         71 YO M PMH HTN, current smoker ("mild emphysema" detected on CT 2020), right urothelial carcinoma s/p right nephroureterectomy, bladder cancer, left ureteral stent change for ureteral obstruction every ~6 months (last stent change 3/2022), presents to [facility] for LEFT STENT EXCHANGE 9/28/2022. Denies any palpitations, SOB, N/V, Covid symptoms (fever, chills, cough) or exposure.      ***Covid test scheduled for 9/25/2022 at [facility].         71 YO M PMH HTN, current smoker ("mild emphysema" detected on CT 2020), right urothelial carcinoma s/p right nephroureterectomy, bladder cancer, left ureteral stent change for ureteral obstruction every ~6 months (last stent change 3/2022), presents to PST for LEFT STENT EXCHANGE 9/28/2022. Denies any palpitations, SOB, N/V, Covid symptoms (fever, chills, cough) or exposure.      ** Pt reports 7 day course of antibiotics was prescribed by PCP & completed for +Ucx. Ucx repeated on 9/8/2022 by Dr. Smith, + for coag negative Staphylococcus, not treated per pt. Ucx repeated in PST 9/14/2022.    ***Covid test scheduled for 9/25/2022 at [facility].

## 2022-09-17 LAB
CULTURE RESULTS: SIGNIFICANT CHANGE UP
SPECIMEN SOURCE: SIGNIFICANT CHANGE UP

## 2022-09-19 ENCOUNTER — NON-APPOINTMENT (OUTPATIENT)
Age: 72
End: 2022-09-19

## 2022-09-19 DIAGNOSIS — N40.1 BENIGN PROSTATIC HYPERPLASIA WITH LOWER URINARY TRACT SYMPTOMS: ICD-10-CM

## 2022-09-19 DIAGNOSIS — C67.8 MALIGNANT NEOPLASM OF OVERLAPPING SITES OF BLADDER: ICD-10-CM

## 2022-09-25 ENCOUNTER — OUTPATIENT (OUTPATIENT)
Dept: OUTPATIENT SERVICES | Facility: HOSPITAL | Age: 72
LOS: 1 days | End: 2022-09-25
Payer: MEDICARE

## 2022-09-25 DIAGNOSIS — Z11.52 ENCOUNTER FOR SCREENING FOR COVID-19: ICD-10-CM

## 2022-09-25 DIAGNOSIS — Z96.0 PRESENCE OF UROGENITAL IMPLANTS: Chronic | ICD-10-CM

## 2022-09-25 DIAGNOSIS — Z90.5 ACQUIRED ABSENCE OF KIDNEY: Chronic | ICD-10-CM

## 2022-09-25 DIAGNOSIS — Z98.41 CATARACT EXTRACTION STATUS, RIGHT EYE: Chronic | ICD-10-CM

## 2022-09-25 DIAGNOSIS — Z98.890 OTHER SPECIFIED POSTPROCEDURAL STATES: Chronic | ICD-10-CM

## 2022-09-25 LAB — SARS-COV-2 RNA SPEC QL NAA+PROBE: SIGNIFICANT CHANGE UP

## 2022-09-25 PROCEDURE — U0005: CPT

## 2022-09-25 PROCEDURE — C9803: CPT

## 2022-09-25 PROCEDURE — U0003: CPT

## 2022-09-27 ENCOUNTER — TRANSCRIPTION ENCOUNTER (OUTPATIENT)
Age: 72
End: 2022-09-27

## 2022-09-28 ENCOUNTER — TRANSCRIPTION ENCOUNTER (OUTPATIENT)
Age: 72
End: 2022-09-28

## 2022-09-28 ENCOUNTER — RESULT REVIEW (OUTPATIENT)
Age: 72
End: 2022-09-28

## 2022-09-28 ENCOUNTER — OUTPATIENT (OUTPATIENT)
Dept: OUTPATIENT SERVICES | Facility: HOSPITAL | Age: 72
LOS: 1 days | Discharge: HOME | End: 2022-09-28
Payer: MEDICARE

## 2022-09-28 ENCOUNTER — APPOINTMENT (OUTPATIENT)
Dept: UROLOGY | Facility: HOSPITAL | Age: 72
End: 2022-09-28

## 2022-09-28 VITALS
RESPIRATION RATE: 16 BRPM | WEIGHT: 130.07 LBS | SYSTOLIC BLOOD PRESSURE: 115 MMHG | OXYGEN SATURATION: 98 % | TEMPERATURE: 98 F | HEIGHT: 63 IN | DIASTOLIC BLOOD PRESSURE: 63 MMHG | HEART RATE: 69 BPM

## 2022-09-28 VITALS
RESPIRATION RATE: 16 BRPM | SYSTOLIC BLOOD PRESSURE: 127 MMHG | OXYGEN SATURATION: 98 % | DIASTOLIC BLOOD PRESSURE: 59 MMHG | HEART RATE: 80 BPM

## 2022-09-28 DIAGNOSIS — C67.9 MALIGNANT NEOPLASM OF BLADDER, UNSPECIFIED: ICD-10-CM

## 2022-09-28 DIAGNOSIS — Z90.5 ACQUIRED ABSENCE OF KIDNEY: Chronic | ICD-10-CM

## 2022-09-28 DIAGNOSIS — N40.1 BENIGN PROSTATIC HYPERPLASIA WITH LOWER URINARY TRACT SYMPTOMS: ICD-10-CM

## 2022-09-28 DIAGNOSIS — N13.30 UNSPECIFIED HYDRONEPHROSIS: ICD-10-CM

## 2022-09-28 DIAGNOSIS — Z00.00 ENCOUNTER FOR GENERAL ADULT MEDICAL EXAMINATION WITHOUT ABNORMAL FINDINGS: ICD-10-CM

## 2022-09-28 DIAGNOSIS — Z98.890 OTHER SPECIFIED POSTPROCEDURAL STATES: Chronic | ICD-10-CM

## 2022-09-28 DIAGNOSIS — Z96.0 PRESENCE OF UROGENITAL IMPLANTS: Chronic | ICD-10-CM

## 2022-09-28 DIAGNOSIS — Z98.41 CATARACT EXTRACTION STATUS, RIGHT EYE: Chronic | ICD-10-CM

## 2022-09-28 PROCEDURE — C1769: CPT

## 2022-09-28 PROCEDURE — 52332 CYSTOSCOPY AND TREATMENT: CPT | Mod: LT

## 2022-09-28 PROCEDURE — 52351 CYSTOURETERO & OR PYELOSCOPE: CPT | Mod: LT

## 2022-09-28 PROCEDURE — 74420 UROGRAPHY RTRGR +-KUB: CPT | Mod: 26

## 2022-09-28 PROCEDURE — 88112 CYTOPATH CELL ENHANCE TECH: CPT

## 2022-09-28 PROCEDURE — C2617: CPT

## 2022-09-28 PROCEDURE — 76000 FLUOROSCOPY <1 HR PHYS/QHP: CPT

## 2022-09-28 PROCEDURE — 88112 CYTOPATH CELL ENHANCE TECH: CPT | Mod: 26

## 2022-09-28 PROCEDURE — C1758: CPT

## 2022-09-28 DEVICE — KIT URET PERFLX PLUS 6FRX26CM: Type: IMPLANTABLE DEVICE | Site: LEFT | Status: FUNCTIONAL

## 2022-09-28 DEVICE — IMPLANTABLE DEVICE: Type: IMPLANTABLE DEVICE | Site: LEFT | Status: FUNCTIONAL

## 2022-09-28 DEVICE — GUIDEWIRE SENSOR DUAL-FLEX NITINOL STRAIGHT .035" X 150CM: Type: IMPLANTABLE DEVICE | Site: LEFT | Status: FUNCTIONAL

## 2022-09-28 DEVICE — URETERAL CATH OPEN END 5FR 70CM: Type: IMPLANTABLE DEVICE | Site: LEFT | Status: FUNCTIONAL

## 2022-09-28 RX ORDER — CEFAZOLIN SODIUM 1 G
2000 VIAL (EA) INJECTION ONCE
Refills: 0 | Status: COMPLETED | OUTPATIENT
Start: 2022-09-28 | End: 2022-09-28

## 2022-09-28 RX ORDER — OXYCODONE HYDROCHLORIDE 5 MG/1
5 TABLET ORAL ONCE
Refills: 0 | Status: DISCONTINUED | OUTPATIENT
Start: 2022-09-28 | End: 2022-09-28

## 2022-09-28 RX ORDER — CEPHALEXIN 500 MG
1 CAPSULE ORAL
Qty: 6 | Refills: 0
Start: 2022-09-28 | End: 2022-09-30

## 2022-09-28 RX ORDER — ONDANSETRON 8 MG/1
4 TABLET, FILM COATED ORAL ONCE
Refills: 0 | Status: DISCONTINUED | OUTPATIENT
Start: 2022-09-28 | End: 2022-10-12

## 2022-09-28 RX ORDER — SODIUM CHLORIDE 9 MG/ML
1000 INJECTION, SOLUTION INTRAVENOUS
Refills: 0 | Status: DISCONTINUED | OUTPATIENT
Start: 2022-09-28 | End: 2022-10-12

## 2022-09-28 RX ORDER — LIDOCAINE HCL 20 MG/ML
0.2 VIAL (ML) INJECTION ONCE
Refills: 0 | Status: COMPLETED | OUTPATIENT
Start: 2022-09-28 | End: 2022-09-28

## 2022-09-28 RX ORDER — HYDROMORPHONE HYDROCHLORIDE 2 MG/ML
0.25 INJECTION INTRAMUSCULAR; INTRAVENOUS; SUBCUTANEOUS
Refills: 0 | Status: DISCONTINUED | OUTPATIENT
Start: 2022-09-28 | End: 2022-09-28

## 2022-09-28 RX ADMIN — SODIUM CHLORIDE 3 MILLILITER(S): 9 INJECTION INTRAMUSCULAR; INTRAVENOUS; SUBCUTANEOUS at 08:10

## 2022-09-28 NOTE — ASU PATIENT PROFILE, ADULT - NSICDXPASTSURGICALHX_GEN_ALL_CORE_FT
PAST SURGICAL HISTORY:  H/O hand surgery right    H/O right nephrectomy 2017    History of bilateral cataract extraction     History of prostate surgery laser with cystoscopy    S/P ureteral stent placement many times - q6 month exchange

## 2022-09-28 NOTE — BRIEF OPERATIVE NOTE - NSICDXBRIEFPROCEDURE_GEN_ALL_CORE_FT
PROCEDURES:  Cystoscopy 28-Sep-2022 10:59:59  Celi Samuels  Left ureteroscopy 28-Sep-2022 11:00:12  Celi Samuels  Retrograde pyelogram 28-Sep-2022 11:00:26  Celi Samuels  Change external ureteral stent 28-Sep-2022 11:01:28  Celi Samuels

## 2022-09-28 NOTE — ASU DISCHARGE PLAN (ADULT/PEDIATRIC) - ASU DC SPECIAL INSTRUCTIONSFT
It is normal to see some blood in your urine after a procedure. This should improve.  Take over the counter pain medication such as Tylenol or ibuprofen as needed for pain  Take your prescribed antibiotic to completion  Follow up with Dr. Smith in 1 month

## 2022-09-28 NOTE — ASU DISCHARGE PLAN (ADULT/PEDIATRIC) - NS MD DC FALL RISK RISK
For information on Fall & Injury Prevention, visit: [email or web address] OR  [email or web address] OR  https://www.cdc.gov/steadi/patient.html

## 2022-09-28 NOTE — ASU DISCHARGE PLAN (ADULT/PEDIATRIC) - CARE PROVIDER_API CALL
Paul Smith)  Urology  [address] [place] [address], [address]  [place] [address]  Phone: [phone]  Fax: [phone]  Established Patient  Follow Up Time:

## 2022-09-28 NOTE — ASU DISCHARGE PLAN (ADULT/PEDIATRIC) - NURSING INSTRUCTIONS
Next dose of Tylenol will be on or after __4:30pm_________ ,today/tonight and every 6 hours afterwards for pain management, do not take any Tylenol containing products until this time. Your first dose of Tylenol was given at ____10:30am_______. Do not exceed more than 4000mg of Tylenol in one 24 hour setting.

## 2022-09-29 LAB — NON-GYNECOLOGICAL CYTOLOGY STUDY: SIGNIFICANT CHANGE UP

## 2022-11-11 ENCOUNTER — INPATIENT (INPATIENT)
Facility: HOSPITAL | Age: 72
LOS: 3 days | Discharge: HOME | End: 2022-11-15
Attending: INTERNAL MEDICINE | Admitting: INTERNAL MEDICINE

## 2022-11-11 VITALS
RESPIRATION RATE: 18 BRPM | HEART RATE: 78 BPM | SYSTOLIC BLOOD PRESSURE: 157 MMHG | HEIGHT: 63 IN | DIASTOLIC BLOOD PRESSURE: 70 MMHG | OXYGEN SATURATION: 98 % | TEMPERATURE: 99 F

## 2022-11-11 DIAGNOSIS — Z29.9 ENCOUNTER FOR PROPHYLACTIC MEASURES, UNSPECIFIED: ICD-10-CM

## 2022-11-11 DIAGNOSIS — Z98.41 CATARACT EXTRACTION STATUS, RIGHT EYE: Chronic | ICD-10-CM

## 2022-11-11 DIAGNOSIS — Z90.5 ACQUIRED ABSENCE OF KIDNEY: Chronic | ICD-10-CM

## 2022-11-11 DIAGNOSIS — Z96.0 PRESENCE OF UROGENITAL IMPLANTS: Chronic | ICD-10-CM

## 2022-11-11 DIAGNOSIS — S02.30XA FRACTURE OF ORBITAL FLOOR, UNSPECIFIED SIDE, INITIAL ENCOUNTER FOR CLOSED FRACTURE: ICD-10-CM

## 2022-11-11 DIAGNOSIS — N18.9 CHRONIC KIDNEY DISEASE, UNSPECIFIED: ICD-10-CM

## 2022-11-11 DIAGNOSIS — Z98.890 OTHER SPECIFIED POSTPROCEDURAL STATES: Chronic | ICD-10-CM

## 2022-11-11 DIAGNOSIS — C67.9 MALIGNANT NEOPLASM OF BLADDER, UNSPECIFIED: ICD-10-CM

## 2022-11-11 DIAGNOSIS — S02.85XA FRACTURE OF ORBIT, UNSPECIFIED, INITIAL ENCOUNTER FOR CLOSED FRACTURE: ICD-10-CM

## 2022-11-11 DIAGNOSIS — I10 ESSENTIAL (PRIMARY) HYPERTENSION: ICD-10-CM

## 2022-11-11 LAB
ALBUMIN SERPL ELPH-MCNC: 4.3 G/DL — SIGNIFICANT CHANGE UP (ref 3.3–5)
ALP SERPL-CCNC: 62 U/L — SIGNIFICANT CHANGE UP (ref 40–120)
ALT FLD-CCNC: 16 U/L — SIGNIFICANT CHANGE UP (ref 4–41)
ANION GAP SERPL CALC-SCNC: 14 MMOL/L — SIGNIFICANT CHANGE UP (ref 7–14)
APTT BLD: 26.4 SEC — LOW (ref 27–36.3)
AST SERPL-CCNC: 28 U/L — SIGNIFICANT CHANGE UP (ref 4–40)
BASOPHILS # BLD AUTO: 0.03 K/UL — SIGNIFICANT CHANGE UP (ref 0–0.2)
BASOPHILS NFR BLD AUTO: 0.3 % — SIGNIFICANT CHANGE UP (ref 0–2)
BILIRUB SERPL-MCNC: 0.4 MG/DL — SIGNIFICANT CHANGE UP (ref 0.2–1.2)
BLD GP AB SCN SERPL QL: NEGATIVE — SIGNIFICANT CHANGE UP
BUN SERPL-MCNC: 51 MG/DL — HIGH (ref 7–23)
CALCIUM SERPL-MCNC: 7.9 MG/DL — LOW (ref 8.4–10.5)
CHLORIDE SERPL-SCNC: 104 MMOL/L — SIGNIFICANT CHANGE UP (ref 98–107)
CO2 SERPL-SCNC: 21 MMOL/L — LOW (ref 22–31)
CREAT SERPL-MCNC: 1.97 MG/DL — HIGH (ref 0.5–1.3)
EGFR: 35 ML/MIN/1.73M2 — LOW
EOSINOPHIL # BLD AUTO: 0.12 K/UL — SIGNIFICANT CHANGE UP (ref 0–0.5)
EOSINOPHIL NFR BLD AUTO: 1.3 % — SIGNIFICANT CHANGE UP (ref 0–6)
FLUAV AG NPH QL: SIGNIFICANT CHANGE UP
FLUBV AG NPH QL: SIGNIFICANT CHANGE UP
GLUCOSE SERPL-MCNC: 96 MG/DL — SIGNIFICANT CHANGE UP (ref 70–99)
HCT VFR BLD CALC: 35.7 % — LOW (ref 39–50)
HGB BLD-MCNC: 11.4 G/DL — LOW (ref 13–17)
IANC: 7.17 K/UL — SIGNIFICANT CHANGE UP (ref 1.8–7.4)
IMM GRANULOCYTES NFR BLD AUTO: 0.3 % — SIGNIFICANT CHANGE UP (ref 0–0.9)
INR BLD: 1.03 RATIO — SIGNIFICANT CHANGE UP (ref 0.88–1.16)
LYMPHOCYTES # BLD AUTO: 1.07 K/UL — SIGNIFICANT CHANGE UP (ref 1–3.3)
LYMPHOCYTES # BLD AUTO: 11.4 % — LOW (ref 13–44)
MCHC RBC-ENTMCNC: 29.5 PG — SIGNIFICANT CHANGE UP (ref 27–34)
MCHC RBC-ENTMCNC: 31.9 GM/DL — LOW (ref 32–36)
MCV RBC AUTO: 92.2 FL — SIGNIFICANT CHANGE UP (ref 80–100)
MONOCYTES # BLD AUTO: 0.93 K/UL — HIGH (ref 0–0.9)
MONOCYTES NFR BLD AUTO: 9.9 % — SIGNIFICANT CHANGE UP (ref 2–14)
NEUTROPHILS # BLD AUTO: 7.17 K/UL — SIGNIFICANT CHANGE UP (ref 1.8–7.4)
NEUTROPHILS NFR BLD AUTO: 76.8 % — SIGNIFICANT CHANGE UP (ref 43–77)
NRBC # BLD: 0 /100 WBCS — SIGNIFICANT CHANGE UP (ref 0–0)
NRBC # FLD: 0 K/UL — SIGNIFICANT CHANGE UP (ref 0–0)
PLATELET # BLD AUTO: 257 K/UL — SIGNIFICANT CHANGE UP (ref 150–400)
POTASSIUM SERPL-MCNC: 3.9 MMOL/L — SIGNIFICANT CHANGE UP (ref 3.5–5.3)
POTASSIUM SERPL-SCNC: 3.9 MMOL/L — SIGNIFICANT CHANGE UP (ref 3.5–5.3)
PROT SERPL-MCNC: 7.3 G/DL — SIGNIFICANT CHANGE UP (ref 6–8.3)
PROTHROM AB SERPL-ACNC: 12 SEC — SIGNIFICANT CHANGE UP (ref 10.5–13.4)
RBC # BLD: 3.87 M/UL — LOW (ref 4.2–5.8)
RBC # FLD: 13.1 % — SIGNIFICANT CHANGE UP (ref 10.3–14.5)
RH IG SCN BLD-IMP: POSITIVE — SIGNIFICANT CHANGE UP
RSV RNA NPH QL NAA+NON-PROBE: SIGNIFICANT CHANGE UP
SARS-COV-2 RNA SPEC QL NAA+PROBE: SIGNIFICANT CHANGE UP
SODIUM SERPL-SCNC: 139 MMOL/L — SIGNIFICANT CHANGE UP (ref 135–145)
UFH PPP CHRO-ACNC: 0.05 IU/ML — LOW (ref 0.3–0.7)
WBC # BLD: 9.35 K/UL — SIGNIFICANT CHANGE UP (ref 3.8–10.5)
WBC # FLD AUTO: 9.35 K/UL — SIGNIFICANT CHANGE UP (ref 3.8–10.5)

## 2022-11-11 PROCEDURE — 99223 1ST HOSP IP/OBS HIGH 75: CPT | Mod: FS

## 2022-11-11 PROCEDURE — 73030 X-RAY EXAM OF SHOULDER: CPT | Mod: 26,RT

## 2022-11-11 PROCEDURE — 99285 EMERGENCY DEPT VISIT HI MDM: CPT | Mod: FS

## 2022-11-11 RX ORDER — AMLODIPINE BESYLATE 2.5 MG/1
5 TABLET ORAL DAILY
Refills: 0 | Status: DISCONTINUED | OUTPATIENT
Start: 2022-11-11 | End: 2022-11-15

## 2022-11-11 RX ORDER — AMPICILLIN SODIUM AND SULBACTAM SODIUM 250; 125 MG/ML; MG/ML
3 INJECTION, POWDER, FOR SUSPENSION INTRAMUSCULAR; INTRAVENOUS EVERY 12 HOURS
Refills: 0 | Status: DISCONTINUED | OUTPATIENT
Start: 2022-11-11 | End: 2022-11-13

## 2022-11-11 RX ORDER — NICOTINE POLACRILEX 2 MG
1 GUM BUCCAL DAILY
Refills: 0 | Status: DISCONTINUED | OUTPATIENT
Start: 2022-11-11 | End: 2022-11-15

## 2022-11-11 RX ORDER — OXYCODONE HYDROCHLORIDE 5 MG/1
5 TABLET ORAL EVERY 8 HOURS
Refills: 0 | Status: DISCONTINUED | OUTPATIENT
Start: 2022-11-11 | End: 2022-11-15

## 2022-11-11 RX ORDER — FINASTERIDE 5 MG/1
5 TABLET, FILM COATED ORAL DAILY
Refills: 0 | Status: DISCONTINUED | OUTPATIENT
Start: 2022-11-11 | End: 2022-11-15

## 2022-11-11 RX ORDER — ACETAMINOPHEN 500 MG
650 TABLET ORAL EVERY 6 HOURS
Refills: 0 | Status: DISCONTINUED | OUTPATIENT
Start: 2022-11-11 | End: 2022-11-15

## 2022-11-11 RX ORDER — ACETAMINOPHEN 500 MG
1000 TABLET ORAL ONCE
Refills: 0 | Status: COMPLETED | OUTPATIENT
Start: 2022-11-11 | End: 2022-11-11

## 2022-11-11 RX ORDER — INFLUENZA VIRUS VACCINE 15; 15; 15; 15 UG/.5ML; UG/.5ML; UG/.5ML; UG/.5ML
0.7 SUSPENSION INTRAMUSCULAR ONCE
Refills: 0 | Status: COMPLETED | OUTPATIENT
Start: 2022-11-11 | End: 2022-11-11

## 2022-11-11 RX ADMIN — AMPICILLIN SODIUM AND SULBACTAM SODIUM 200 GRAM(S): 250; 125 INJECTION, POWDER, FOR SUSPENSION INTRAMUSCULAR; INTRAVENOUS at 12:13

## 2022-11-11 RX ADMIN — FINASTERIDE 5 MILLIGRAM(S): 5 TABLET, FILM COATED ORAL at 22:13

## 2022-11-11 RX ADMIN — Medication 400 MILLIGRAM(S): at 08:24

## 2022-11-11 RX ADMIN — Medication 1000 MILLIGRAM(S): at 08:40

## 2022-11-11 RX ADMIN — Medication 1000 MILLIGRAM(S): at 08:50

## 2022-11-11 NOTE — CONSULT NOTE ADULT - SUBJECTIVE AND OBJECTIVE BOX
Patient is a 72y old  Male who presents with a chief complaint of a mechanical fall. Patient was transferred from [facility].     73yM with past medical history of COPD, anemia, HTN, right urothelial carcinoma s/p right nephroureterectomy, bladder cancer, left ureteral stent for ureteral obstruction, sent as a transfer from outside hospital with multiple facial fractures after mechanical fall. Patient states last night he tripped on his shoelace fell forward hitting the right side of his face on the curb. He believes he may have passed out for a few moments. Had a CT scan performed at outside hospital (CT head without evidence of ICH), CT face which shows a right orbit lateral wall fracture with fragments that protrude into the lateral aspect of the orbit and indents the lateral rectus muscle, findings also concerning for a trimalar fracture. Patient transferred for OMFS evaluation. Patient currently endorses soreness to the right side of the face as well as right shoulder pain. Patient denies dizziness changes to his vision numbness tingling weakness chest pain shortness of breath palpitations abdominal pain nausea vomiting difficulty with memory recent travel or illness or any other concerns.    PAST MEDICAL & SURGICAL HISTORY:  Bladder cancer x 20 yrs  HTN (hypertension)  Prediabetes  Enlarged prostate  Ureter cancer, right 2017 no chemo  Current every day smoker  Arthritis  Emphysema lung diagnosed ct scan   Right inguinal hernia  Anemia    PSH:  H/O right nephrectomy 2017  History of bilateral cataract extraction  S/P ureteral stent placement many times - q6 month exchange  History of prostate surgery laser with cystoscopy  H/O hand surgery right    MEDICATIONS  (STANDING):  MEDICATIONS  (PRN):    Allergies: mitomycin (Hives; Rash)    FAMILY HISTORY:  FH: lung cancer  Mother    FH: type 2 diabetes  Father      *SOCIAL HISTORY: Denies ETOH use. drug use. current every day smoker    Review of systems:  denies fever, chills.   + LOC for a few seconds after fall. patient reports regaining consciousness quickly after.   denies nausea/vomiting/headache. denies CP, SOB, cough. Denies palpatitions. denies blurred/double vision. denies dyspahgia, dyspnea.    T(F): 98 (22 @ 08:39), Max: 98.7 (22 @ 05:53)  HR: 74 (22 @ 08:39) (74 - 78)  BP: 143/67 (22 @ 08:39) (143/67 - 157/70)  RR: 14 (22 @ 08:39) (14 - 18)  SpO2: 98% (22 @ 08:39) (98% - 98%)    PHYSICAL EXAM:    Gen: AAox3, NAD, NC/AT  Head: right sided facial abrasion with dried blood. currently hemostatic.     Eyes: EOMI, PERRL, visual acuity intact, no diplopia. EOM without pain or restriction. right periorbital edema present. right periorbital ecchymosis present. right lateral orbital wall step deformity palpated. right infraorbital rim step deformity palpated.     Ears: Gross hearing intact, No heme appreciated, Condylar head palpated    Nose: no crepitis/septal hematoma/asymmetry.  no Lacerations/abrasions/hematomas.    Malar: right malar depression palpable, endorses CN V-2 paresthesia    Throat: no LAD, supple, FROM, no lesions  Extraoral/Intraoral Exam: CHEL: 40, patient completely edentulate  no lacerations/abrasions/hematomas. right buccal ecchymosis present. no mandibular step deformity, no CN V-3 paresthesia, no signs of infection,  FOM soft, NT. no mobility of maxilla/crepitis. TMJ: no clicking/popping    right maxillary buccal vestibule can be palpated for malar fracture.     CN II-XII intact    LABS:                          11.4   9.35  )-----------( 257      ( 2022 08:30 )             35.7         139  |  104  |  51<H>  ----------------------------<  96  3.9   |  21<L>  |  1.97<H>    Ca    7.9<L>      2022 08:30    TPro  7.3  /  Alb  4.3  /  TBili  0.4  /  DBili  x   /  AST  28  /  ALT  16  /  AlkPhos  62        PT/INR - ( 2022 08:30 )   PT: 12.0 sec;   INR: 1.03 ratio         PTT - ( 2022 08:30 )  PTT:26.4 sec    IMAGIN. fracture of lateral wall of right orbit. the fracture protrudes into the lateral aspect of the orbit and indents the lateral rectus.   2. fracture of the lateral wall of the right orbit  3. fracture of the anterior wall of the right maxillary sinus as well as the lateral wall this is in combination with the depressed mid zygomatic arch fracture on the right and widening of the right frontal zygomatic sutures is consistent with the trimalar fracture.

## 2022-11-11 NOTE — ED PROVIDER NOTE - PHYSICAL EXAMINATION
ecchymoses to right orbital region, small abrasion to right side of face, +swelling to right face lateral to orbit  EOMs intact although with pain in right lateral gaze ecchymoses to right orbital region, small abrasion to right side of face, +swelling to right face lateral to orbit  EOMs intact although with pain in right lateral gaze  Visual Acuity 20/40

## 2022-11-11 NOTE — ED ADULT NURSE NOTE - CHIEF COMPLAINT QUOTE
Pt arrives From [facility] + Rt sided facial swelling s/p mechanical fall. As per EMT" He received tylenol and they gave him a Td shot."  Pt st" I was holding cup of coffee and tripped fell on rt side of face...may have blacked out for few seconds but I got up right away." Pt calm pleasant affect. Axo4, Denies any nausea, dizziness, denies any vision loss or changes.  hx of Bladder ca dx 20yrs ago, copd, anemia, htn

## 2022-11-11 NOTE — H&P ADULT - CARDIOVASCULAR
normal/regular rate and rhythm/S1 S2 present/no gallops/no rub/no murmur/no JVD/no pedal edema details…

## 2022-11-11 NOTE — CONSULT NOTE ADULT - SUBJECTIVE AND OBJECTIVE BOX
[facility] DEPARTMENT OF OPHTHALMOLOGY - INITIAL ADULT CONSULT  -----------------------------------------------------------------------------------------------------------  Renee Bush MD PGY-3  -----------------------------------------------------------------------------------------------------------    HPI: 73yM with past medical history of COPD, anemia, HTN, right urothelial carcinoma s/p right nephroureterectomy, bladder cancer, left ureteral stent for ureteral obstruction, sent as a transfer from outside hospital with multiple facial fractures after mechanical fall. Patient states last night he tripped on his shoelace fell forward hitting the right side of his face on the curb. He believes he may have passed out for a few moments. Had a CT scan performed at outside hospital (CT head without evidence of ICH), CT face which shows a right orbit lateral wall fracture with fragments that protrude into the lateral aspect of the orbit and indents the lateral rectus muscle, findings also concerning for a trimalar fracture. Patient transferred for OMFS evaluation. Patient currently endorses soreness to the right side of the face as well as right shoulder pain. Patient denies dizziness changes to his vision numbness tingling weakness chest pain shortness of breath palpitations abdominal pain nausea vomiting difficulty with memory recent travel or illness or any other concerns.    Interval History: Denies blurry vision or diplopia.     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 Follows with Dr. Langford.   Ophthalmic Medications: none  FAMILY HISTORY:  FH: lung cancer  Mother    FH: type 2 diabetes  Father     no glc/amd  Social History: no etoh/tobacco    MEDICATIONS  (STANDING):    MEDICATIONS  (PRN):    Allergies & Intolerances:   mitomycin (Hives; Rash)    Review of Systems:  Constitutional: No fever, chills  Eyes: No blurry vision, flashes, floaters, FBS, erythema, discharge, double vision, OU  Neuro: No tremors  Cardiovascular: No chest pain, palpitations  Respiratory: No SOB, no cough  GI: No nausea, vomiting, abdominal pain  : No dysuria  Skin: no rash  Psych: no depression  Endocrine: no polyuria, polydipsia  Heme/lymph: no swelling    VITALS: T(C): 36.7 (11-11-22 @ 08:39)  T(F): 98 (11-11-22 @ 08:39), Max: 98.7 (11-11-22 @ 05:53)  HR: 74 (11-11-22 @ 08:39) (74 - 78)  BP: 143/67 (11-11-22 @ 08:39) (143/67 - 157/70)  RR:  (14 - 18)  SpO2:  (98% - 98%)  Wt(kg): --  General: AAO x 3, appropriate mood and affect    Ophthalmology Exam:  Visual acuity (sc): 20/20 OD, 20/200 OS PHNI (baseline per pt)  Pupils: Surgical pupils OU, no APD  Ttono: 15, 10  Extraocular movements (EOMs): Full OU, no pain, no diplopia  Confrontational Visual Field (CVF): Full OU  Color Plates: 12/12 OD, unable OS    Pen Light Exam (PLE)  External: periorbital ecchymosis OD, Flat OS  Lids/Lashes/Lacrimal Ducts: Flat OU    Sclera/Conjunctiva: W+Q OU  Cornea: Cl OD, s/p PK clear OS  Anterior Chamber: D+F OU    Iris: Flat OD, atrophy OS  Lens: IOL OU    Labs/Imaging:  Per ED:  CT face which shows a right orbit lateral wall fracture with fragments that protrude into the lateral aspect of the orbit and indents the lateral rectus muscle, findings also concerning for a trimalar fracture.    [facility] DEPARTMENT OF OPHTHALMOLOGY - INITIAL ADULT CONSULT  -----------------------------------------------------------------------------------------------------------  Renee Bush MD PGY-3  -----------------------------------------------------------------------------------------------------------    HPI: 73yM with past medical history of COPD, anemia, HTN, right urothelial carcinoma s/p right nephroureterectomy, bladder cancer, left ureteral stent for ureteral obstruction, sent as a transfer from outside hospital with multiple facial fractures after mechanical fall. Patient states last night he tripped on his shoelace fell forward hitting the right side of his face on the curb. He believes he may have passed out for a few moments. Had a CT scan performed at outside hospital (CT head without evidence of ICH), CT face which shows a right orbit lateral wall fracture with fragments that protrude into the lateral aspect of the orbit and indents the lateral rectus muscle, findings also concerning for a trimalar fracture. Patient transferred for OMFS evaluation. Patient currently endorses soreness to the right side of the face as well as right shoulder pain. Patient denies dizziness changes to his vision numbness tingling weakness chest pain shortness of breath palpitations abdominal pain nausea vomiting difficulty with memory recent travel or illness or any other concerns.    Interval History: Denies blurry vision or diplopia.     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 Follows with Dr. Langford.   Ophthalmic Medications: none  FAMILY HISTORY:  FH: lung cancer  Mother    FH: type 2 diabetes  Father     no glc/amd  Social History: no etoh/tobacco    MEDICATIONS  (STANDING):    MEDICATIONS  (PRN):    Allergies & Intolerances:   mitomycin (Hives; Rash)    Review of Systems:  Constitutional: No fever, chills  Eyes: No blurry vision, flashes, floaters, FBS, erythema, discharge, double vision, OU  Neuro: No tremors  Cardiovascular: No chest pain, palpitations  Respiratory: No SOB, no cough  GI: No nausea, vomiting, abdominal pain  : No dysuria  Skin: no rash  Psych: no depression  Endocrine: no polyuria, polydipsia  Heme/lymph: no swelling    VITALS: T(C): 36.7 (22 @ 08:39)  T(F): 98 (22 @ 08:39), Max: 98.7 (22 @ 05:53)  HR: 74 (22 @ 08:39) (74 - 78)  BP: 143/67 (22 @ 08:39) (143/67 - 157/70)  RR:  (14 - 18)  SpO2:  (98% - 98%)  Wt(kg): --  General: AAO x 3, appropriate mood and affect    Ophthalmology Exam:  Visual acuity (sc): 20/20 OD, 20/200 OS PHNI (baseline per pt)  Pupils: Surgical pupils OU, no APD  Ttono: 15, 10  Extraocular movements (EOMs): Full OU, no pain, no diplopia  Confrontational Visual Field (CVF): Full OU  Color Plates: 12/12 OD, unable OS    Pen Light Exam (PLE)  External: periorbital ecchymosis OD, Flat OS  Lids/Lashes/Lacrimal Ducts: Flat OU    Sclera/Conjunctiva: W+Q OU  Cornea: Cl OD, s/p PK clear OS  Anterior Chamber: D+F OU    Iris: Flat OD, atrophy OS  Lens: IOL OU    Labs/Imaging:  Per ED:  CT face which shows a right orbit lateral wall fracture with fragments that protrude into the lateral aspect of the orbit and indents the lateral rectus muscle, findings also concerning for a trimalar fracture.     IMAGIN. fracture of lateral wall of right orbit. the fracture protrudes into the lateral aspect of the orbit and indents the lateral rectus.   2. fracture of the lateral wall of the right orbit  3. fracture of the anterior wall of the right maxillary sinus as well as the lateral wall this is in combination with the depressed mid zygomatic arch fracture on the right and widening of the right frontal zygomatic sutures is consistent with the trimalar fracture.    [facility] DEPARTMENT OF OPHTHALMOLOGY - INITIAL ADULT CONSULT  -----------------------------------------------------------------------------------------------------------  Renee Bush MD PGY-3  -----------------------------------------------------------------------------------------------------------    HPI: 73yM with past medical history of COPD, anemia, HTN, right urothelial carcinoma s/p right nephroureterectomy, bladder cancer, left ureteral stent for ureteral obstruction, sent as a transfer from outside hospital with multiple facial fractures after mechanical fall. Patient states last night he tripped on his shoelace fell forward hitting the right side of his face on the curb. He believes he may have passed out for a few moments. Had a CT scan performed at outside hospital (CT head without evidence of ICH), CT face which shows a right orbit lateral wall fracture with fragments that protrude into the lateral aspect of the orbit and indents the lateral rectus muscle, findings also concerning for a trimalar fracture. Patient transferred for OMFS evaluation. Patient currently endorses soreness to the right side of the face as well as right shoulder pain. Patient denies dizziness changes to his vision numbness tingling weakness chest pain shortness of breath palpitations abdominal pain nausea vomiting difficulty with memory recent travel or illness or any other concerns.    Interval History: Denies blurry vision or diplopia. Denies eye pain. Follows with Dr. Kristie Langford. History of CE/IOL OU, complicated OS which needed K transplant 2 years ago.     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 Follows with Dr. Langford.   Ophthalmic Medications: none  FAMILY HISTORY:  FH: lung cancer  Mother    FH: type 2 diabetes  Father     no glc/amd  Social History: no etoh/tobacco    MEDICATIONS  (STANDING):    MEDICATIONS  (PRN):    Allergies & Intolerances:   mitomycin (Hives; Rash)    Review of Systems:  Constitutional: No fever, chills  Eyes: No blurry vision, flashes, floaters, FBS, erythema, discharge, double vision, OU  Neuro: No tremors  Cardiovascular: No chest pain, palpitations  Respiratory: No SOB, no cough  GI: No nausea, vomiting, abdominal pain  : No dysuria  Skin: no rash  Psych: no depression  Endocrine: no polyuria, polydipsia  Heme/lymph: no swelling    VITALS: T(C): 36.7 (22 @ 08:39)  T(F): 98 (22 @ 08:39), Max: 98.7 (22 @ 05:53)  HR: 74 (22 @ 08:39) (74 - 78)  BP: 143/67 (22 @ 08:39) (143/67 - 157/70)  RR:  (14 - 18)  SpO2:  (98% - 98%)  Wt(kg): --  General: AAO x 3, appropriate mood and affect    Ophthalmology Exam:  Visual acuity (sc): 20/20 OD, 20/200 OS PHNI (baseline per pt)  Pupils: Surgical pupils OU, no APD  Ttono: 15, 10  Extraocular movements (EOMs): Full OU, no pain, no diplopia  Confrontational Visual Field (CVF): Full OU  Color Plates: 12/12 OD, unable OS    Pen Light Exam (PLE)  External: periorbital ecchymosis OD, Flat OS  Lids/Lashes/Lacrimal Ducts: Flat OU    Sclera/Conjunctiva: W+Q OU  Cornea: Cl OD, s/p K transplant clear, 2 limbal sutures temporally OS  Anterior Chamber: D+F OU    Iris: Flat OD, atrophy OS  Lens: IOL OU    Fundus Exam: dilated with 1% tropicamide and 2.5% phenylephrine  Approval obtained from primary team for dilation  Patient aware that pupils can remained dilated for at least 4-6 hours  Exam performed with 20D lens    Vitreous: wnl OU  Disc, cup/disc: sharp and pink, 0.4 OU  Macula: wnl OU  Vessels: wnl OU  Periphery: wnl OU      Labs/Imaging:  Per ED:  CT face which shows a right orbit lateral wall fracture with fragments that protrude into the lateral aspect of the orbit and indents the lateral rectus muscle, findings also concerning for a trimalar fracture.     IMAGIN. fracture of lateral wall of right orbit. the fracture protrudes into the lateral aspect of the orbit and indents the lateral rectus.   2. fracture of the lateral wall of the right orbit  3. fracture of the anterior wall of the right maxillary sinus as well as the lateral wall this is in combination with the depressed mid zygomatic arch fracture on the right and widening of the right frontal zygomatic sutures is consistent with the trimalar fracture.

## 2022-11-11 NOTE — ED PROVIDER NOTE - ATTENDING APP SHARED VISIT CONTRIBUTION OF CARE
Dr. Leung: This is a 73-year-old male with past medical history of COPD, anemia, hypertension, right partial nephrectomy for bladder cancer and urothelial carcinoma, sent to [facility] as a transfer after a mechanical fall with multiple facial fractures.  Patient states that he tripped on his shoelaces and fell onto uneven street.  No palpitations, dizziness or reported syncope.  No loss of consciousness or injuries anywhere else.  Patient has chronic right shoulder pain that is still bothering him today.  Patient does endorse that he has pain in right eye when looking to extreme right, but he has no visual changes.  He has pain to the bony orbits, especially right, but no pain anywhere else.  No headache.  No fever, chest pain, SOB, N/V/D, abdominal pain, focal weakness or other complaints.  On exam pt overall well appearing, in NAD, heart RRR, lungs CTAB, abd NTND, extremities without swelling, strength 5/5 in all extremities and skin without rash.  Face with swelling and ecchymosis to right periorbital region with associated TTP but no obvious step off palpated.  EOMI/PERRLA.  +pain with extreme rightward gaze of right eye.  Nose normal appearing, without swelling, no epistaxis or septal hematomas bilaterally.  Jaw with full ROM and no trismus.  Oral cavity normal appearing, teeth intact (with dentures), tongue midline without elevation.  Speech normal.  Gait normal and pt able to stand easily.  Midline cervical/thoracic/lumbosacral spine without bony TTP or step off.

## 2022-11-11 NOTE — ED ADULT TRIAGE NOTE - CHIEF COMPLAINT QUOTE
Pt arrives From [facility] + Rt sided facial swelling s/p mechanical fall. As per EMT" He received tylenol and they gave him a Td shot."  Pt st" I was holding cup of coffee and tripped fell on rt side of face...may have blacked out for few seconds but I got up right away." Pt calm pleasant affect. Axo4, Denies any nausea, dizziness, denies any vision loss or changes.  hx of Bladder ca dx 20yrs ago, copd, anemia, htn

## 2022-11-11 NOTE — H&P ADULT - PROBLEM SELECTOR PLAN 3
nothing to do at this time - cont finasteride  - nothing to do at this time Bun/Cr: 51/1.97  - this is around baseline  - monitor Cr  - avoid nephrotoxic agents

## 2022-11-11 NOTE — H&P ADULT - RESPIRATORY
normal/clear to auscultation bilaterally/no wheezes/no rales/no respiratory distress/airway patent/breath sounds equal/good air movement

## 2022-11-11 NOTE — ED PROVIDER NOTE - NS ED ATTENDING STATEMENT MOD
This was a shared visit with the SHEA. I reviewed and verified the documentation and independently performed the documented:

## 2022-11-11 NOTE — ED PROVIDER NOTE - CLINICAL SUMMARY MEDICAL DECISION MAKING FREE TEXT BOX
73yM with past medical history of COPD, anemia, HTN, right urothelial carcinoma s/p right nephroureterectomy, bladder cancer, left ureteral stent for ureteral obstruction, sent as a transfer from outside hospital with multiple facial fractures after mechanical fall. CT scan performed at outside hospital (CT head without evidence of ICH), CT face which shows a right orbit lateral wall fracture with fragments that protrude into the lateral aspect of the orbit and indents the lateral rectus muscle, findings also concerning for a trimalar fracture with zygomatic findings. On exam pt is well appearing, non focal neuro exam, +right sided facial swelling/ecchymoses with small abrasion to lateral orbital region, EOMs intact although with pain in right lateral gaze. Pt reporting right shoulder pain, was not imaged at OSH. Tetanus updated at OSH. Plan: OMFS consult, pre-op lab work, xray right shoulder, pain control.

## 2022-11-11 NOTE — ED PROVIDER NOTE - PROGRESS NOTE DETAILS
LESLIE Castro: Pt seen by OMFS, awaiting callback with recommendations. LESLIE Castro: Spoke with OMFS, official recs pending, recommending optho eval at this time. Spoke with optho will see pt in the ED. LESLIE Castro: pt seen by om, requesting medicine admission, unasyn, full liquid diet, plan for repair on Sunday. Pt aware of plan. Pt seen by optho, no evidence of entrapment. Spoke with hospitalist who accepts pt.

## 2022-11-11 NOTE — H&P ADULT - NSHPSOCIALHISTORY_GEN_ALL_CORE
Marital Status: , lives with his son    Occupation:  (still works part time)    Tobacco Use: 0.75 packs per day since age 14    ETOH Use: denies    Drug Use: denies    Flu Vaccine: denies        Pneumonia Vaccine: denies            COVID Vaccine: Pfizer Vaccine (completed series but no booster)

## 2022-11-11 NOTE — H&P ADULT - PROBLEM SELECTOR PLAN 4
bilateral intermittent pneumatic compression device - cont finasteride  - nothing to do at this time

## 2022-11-11 NOTE — H&P ADULT - HISTORY OF PRESENT ILLNESS
74 y/o Male, with a PmHx of COPD, Anemia, BPH, HTN, right urothelial carcinoma s/p right nephroureterectomy, bladder cancer, left ureteral stent for ureteral obstruction (goes every 6 months for a stent exchange - last was 9/2022), CKD, was transferred from [facility] with multiple facial fractures after mechanical fall. 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 He states on the way back he had tripped over a curb causing him to fall forward landing face down. He states he thinks he lost consciousness for a few seconds before coming around. He states after coming around, he proceeded to walk home. When he got home to his house, his daughter-law told him he needed to get checked out and called an ambulance. He was then taken to [facility] in [place]. Pt denies any fever, chills, chest pain, sob, dizziness, blurred vision, abd pain, n/v. Pt states he did have a "ringing" sensation in his head after the event with a 4/10 pain to the right side of his face.  In the ED @ [facility], he had multiple imaging done that showed a right Lateral Wall Fracture/Trimalar Fracture so he was then transferred to [facility] for OMFS and Ophthalmology evaluation. At [facility] ED, he was seen by OMFS and Ophthalmology, started on Unasyn and is now recommended for Surgery on Sunday. He is being admitted to medicine for further medical evaluation and treatment.

## 2022-11-11 NOTE — H&P ADULT - NSICDXPASTMEDICALHX_GEN_ALL_CORE_FT
PAST MEDICAL HISTORY:  Anemia     Arthritis     Bladder cancer x 20 yrs    Current every day smoker     Emphysema lung diagnosed ct scan 2020    Enlarged prostate BPH    HTN (hypertension)     Right inguinal hernia     Ureter cancer, right 2017 no chemo

## 2022-11-11 NOTE — ED ADULT NURSE NOTE - NSIMPLEMENTINTERV_GEN_ALL_ED
Implemented All Fall Risk Interventions:  Doss to call system. Call bell, personal items and telephone within reach. Instruct patient to call for assistance. Room bathroom lighting operational. Non-slip footwear when patient is off stretcher. Physically safe environment: no spills, clutter or unnecessary equipment. Stretcher in lowest position, wheels locked, appropriate side rails in place. Provide visual cue, wrist band, yellow gown, etc. Monitor gait and stability. Monitor for mental status changes and reorient to person, place, and time. Review medications for side effects contributing to fall risk. Reinforce activity limits and safety measures with patient and family.

## 2022-11-11 NOTE — CONSULT NOTE ADULT - ASSESSMENT
Assessment and Recommendations:  72y male w/ pmhx/ochx of *** consulted for ***, found to have ***    ***INCOMPLETE NOTE***     Renee Bush MD PGY-3  Available on [employer] Teams    Outpatient follow-up: Patient should follow-up with his/her ophthalmologist or with [facility] Department of Ophthalmology at the address below     [address] [place] [address]. [address]  [place] [address]  [phone] Assessment and Recommendations:  72y male w/ pmhx/ochx of  COPD, anemia, HTN, right urothelial carcinoma s/p right nephroureterectomy, bladder cancer, left ureteral stent for ureteral obstruction presenting from OSH after fall on face and suffering facial fractures including right lateral wall and trimalar fractures. Ophthalmology consulted for orbital fracture.    # right lateral wall fracture, trimalar fracture  - no evidence of entrapment - EOMs full, no n/v with EOMs  - appreciate OMFS input - planned for ORIF zygomatic fracture on Sunday 11/13  - sinus precautions: no nose blowing, no drinking through straws, sneeze with mouth open  - ice packs to affected area for first 24-48 hours  - antibiotics per primary team/OMFS      ***INCOMPLETE NOTE***     Renee Bush MD PGY-3  Available on [employer] Teams    Outpatient follow-up: Patient should follow-up with his/her ophthalmologist or with [facility] Department of Ophthalmology at the address below     [address] [place] [address]. [address]  [place] [address]  [phone] Assessment and Recommendations:  72y male w/ pmhx/ochx of  COPD, anemia, HTN, right urothelial carcinoma s/p right nephroureterectomy, bladder cancer, left ureteral stent for ureteral obstruction presenting from OSH after fall on face and suffering facial fractures including right lateral wall and trimalar fractures. Ophthalmology consulted for orbital fracture.    # right lateral wall fracture, trimalar fracture  - no evidence of entrapment - EOMs full, no n/v with EOMs  - appreciate OMFS input - planned for ORIF zygomatic fracture on Sunday 11/13  - sinus precautions: no nose blowing, no drinking through straws, sneeze with mouth open  - ice packs to affected area for first 24-48 hours  - antibiotics per primary team/OMFS      Pt seen and discussed with Dr. Hcetor, attending.     Renee Bush MD PGY-3  Available on [employer] Teams    Outpatient follow-up: Patient should follow-up with his/her ophthalmologist or with [facility] Department of Ophthalmology at the address below     [address] [place] [address]. [address]  [place] [address]  [phone]

## 2022-11-11 NOTE — ED ADULT NURSE REASSESSMENT NOTE - NS ED NURSE REASSESS COMMENT FT1
Patient in stretcher. Labs drawn. Medicated as per MD orders. Bruising noted to R orbital region. MD at bedside for eval. Patient updated on plan of care. Comfort and safety maintained. Will continue to monitor Javier CANCINO
patient AOx4 came in  as transfer s/p trip and fall and hurt his eye. unknown LOC. denies use of AC. VS as noted. labs done and meds given. awaiting results and re eval.
patient denies any new complaints. resting comfortably. meds given as ordered. will continue to monitor.

## 2022-11-11 NOTE — H&P ADULT - NS ATTEND AMEND GEN_ALL_CORE FT
Patient seen and examined, care d/w PA.    In summary 72M active smoker w/ COPD, anemia, BPH, HTN, right urothelial carcinoma s/p right nephroureterectomy, bladder cancer, left ureteral stent for ureteral obstruction (goes every 6 months for a stent exchange - last was 9/2022), CKD3-4 transferred from [facility] s/p mechanical fall resulting in R lateral Wall Fracture/Trimalar Fracture pending OR w/ OMFS on 11/13.    # Facial fx: mechanical fall, denies LOC or sx prior to fall just tripped on sidewalk, no prior MI/PCI, no CP, dizziness   - seen by opthalmology and OMFS  - on unasyn supposedly per OMFS  - clears, ice, pain control; no nose blowing, straws etc  - no CP, RCRI of 0, METS >4 (walks 3/4 miles w/o CP or SOB, can climb stairs)--optimized for OR    # Hypocalcemia: chronic  - check vitamin D levels and iPTH  - monitor     # COPD: active smoker, no inhaler use or O2, decent ET of 3/4 mile  - monitor    # HTN  - c/w home meds     # CKD 3/4: likely 2/2 nephrectomy and baseline CKD 2/2 ?HTN  - monitor, renally dose meds, avoid nephrotoxins     # Dispo pending OMFS plan for OR

## 2022-11-11 NOTE — H&P ADULT - ASSESSMENT
74 y/o Male, with a PmHx of COPD, Anemia, BPH, HTN, right urothelial carcinoma s/p right nephroureterectomy, bladder cancer, left ureteral stent for ureteral obstruction (goes every 6 months for a stent exchange - last was 9/2022), CKD, was transferred from [facility] with multiple facial fractures after mechanical fall.  Admitted to medicine for a right Lateral Wall Fracture/Trimalar Fracture. 71 y/o Male, with a PmHx of COPD, Anemia, BPH, HTN, right urothelial carcinoma s/p right nephroureterectomy, bladder cancer, left ureteral stent for ureteral obstruction (goes every 6 months for a stent exchange - last was 9/2022), CKD, was transferred from [facility] with multiple facial fractures after mechanical fall.  Admitted to medicine for a right Lateral Wall Fracture/Trimalar Fracture.

## 2022-11-11 NOTE — H&P ADULT - NSICDXFAMILYHX_GEN_ALL_CORE_FT
FAMILY HISTORY:  FH: lung cancer, Mother  FH: type 2 diabetes, Father  FHx: heart disease, CABG - brother

## 2022-11-11 NOTE — ED PROVIDER NOTE - OBJECTIVE STATEMENT
73-year-old male with past medical history of COPD, right ureteronephrectomy presenting as a transfer with right-sided facial and orbital fracture and for neurosurgery evaluation patient states yesterday night he tripped over uneven pavement and fell hitting an elevated curb with the right side of his head thinks he may have passed out for just a few moments patient now reports soreness to the right side of his head at outside hospital.  CT scan performed that shows fracture of the lateral wall of the right orbit neck fracture fragment protrudes into the lateral aspect of the rectus muscle patient was transferred for OMFS evaluation patient denies dizziness vision changes chest pain shortness of breath palpitations abdominal pain nausea vomiting diarrhea recent travel or illness or any other concerns 73yM with past medical history of COPD, anemia, HTN, right urothelial carcinoma s/p right nephroureterectomy, bladder cancer, left ureteral stent for ureteral obstruction, sent as a transfer from outside hospital with multiple facial fractures after mechanical fall. Patient states last night he tripped on his shoelace fell forward hitting the right side of his face on the curb. He believes he may have passed out for a few moments. Had a CT scan performed at outside hospital (CT head without evidence of ICH), CT face which shows a right orbit lateral wall fracture with fragments that protrude into the lateral aspect of the orbit and indents the lateral rectus muscle, findings also concerning for a trimalar fracture. Patient transferred for OMFS evaluation. Patient currently endorses soreness to the right side of the face as well as right shoulder pain. Patient denies dizziness changes to his vision numbness tingling weakness chest pain shortness of breath palpitations abdominal pain nausea vomiting difficulty with memory recent travel or illness or any other concerns.

## 2022-11-11 NOTE — H&P ADULT - PROBLEM SELECTOR PLAN 1
EKG:  NSR @ 66 w/sinus arrythmia, T inv III  COVID neg    - Ophthalmology c/s following  - OMFS c/s following  - found to have fractures of lateral wall of right orbit. the fracture protrudes into the lateral aspect of the orbit and indents the lateral rectus, fracture of the lateral wall of the right orbit, fracture of the anterior wall of the right maxillary sinus as well as the lateral wall this is in combination with the depressed mid zygomatic arch fracture on the right and widening of the right frontal zygomatic sutures is consistent with the trimalar fracture.  11/11 Right Shoulder Xray - No fractures, dislocations, or AC separation. Moderately advanced AC and moderate glenohumeral osteoarthritis. Humerus slightly high riding relative to glenoid with diminished subacromial space which could correlate with degree of underlying rotator cuff tearing, MRI would be helpful to further assess in this regard as warranted. Generalized osteopenia otherwise no discrete lytic or blastic lesions. No periarticular soft tissue calcifications.  - plan is to go to the OR on Sunday, 11/13 for surgical repair EKG:  NSR @ 66 w/sinus arrythmia, T inv III  COVID neg    - Ophthalmology c/s following  - OMFS c/s following  - found to have fractures of lateral wall of right orbit. the fracture protrudes into the lateral aspect of the orbit and indents the lateral rectus, fracture of the lateral wall of the right orbit, fracture of the anterior wall of the right maxillary sinus as well as the lateral wall this is in combination with the depressed mid zygomatic arch fracture on the right and widening of the right frontal zygomatic sutures is consistent with the trimalar fracture.  11/11 Right Shoulder Xray - No fractures, dislocations, or AC separation. Moderately advanced AC and moderate glenohumeral osteoarthritis. Humerus slightly high riding relative to glenoid with diminished subacromial space which could correlate with degree of underlying rotator cuff tearing, MRI would be helpful to further assess in this regard as warranted. Generalized osteopenia otherwise no discrete lytic or blastic lesions. No periarticular soft tissue calcifications.  - plan is to go to the OR on Sunday, 11/13 for surgical repair  - on Unasyn IV  - pain control

## 2022-11-11 NOTE — CONSULT NOTE ADULT - ASSESSMENT
Assessment:  72 yr old male s/p mechanical fall to right side of face sustaining multiple right sided facial fractures. Patient will need to be optimized for ORIF of fractures in the OR on Sunday 11/13    plan:  - admission to medicine for other comorbidities  - ORIF of zygomatic fracture on Sunday 11/13  - pain control per ED  - full liquid diet  - Sinus Precautions:        - do not blow nose        - sneeze with mouth open        - do not drink through straw    Kadeem Adair  [facility]  [id number]

## 2022-11-11 NOTE — PATIENT PROFILE ADULT - FALL HARM RISK - RISK INTERVENTIONS

## 2022-11-11 NOTE — ED ADULT NURSE NOTE - OBJECTIVE STATEMENT
pt received in room 27. pt is AxOx4 and ambulatory. pt transferred from [facility] for a mechanical fall while being outside yesterday around 10. pt states he tripped over his shoe laces and fell forward on concrete. pt states he had small moment of blurriness and does not remember. pt has swelling and bruising to right eye. No other signs of trauma noted. pt denies headaches, dizziness, blurred vision, chest pain and SOB. pt RR are even and unlabored. MD at bedside for eval. Will continue to monitor.

## 2022-11-11 NOTE — H&P ADULT - NSHPPHYSICALEXAM_GEN_ALL_CORE
Vital Signs Last 24 Hrs  T(C): 36.8 (11 Nov 2022 14:17), Max: 37.1 (11 Nov 2022 05:53)  T(F): 98.2 (11 Nov 2022 14:17), Max: 98.7 (11 Nov 2022 05:53)  HR: 75 (11 Nov 2022 14:17) (73 - 78)  BP: 137/55 (11 Nov 2022 14:17) (137/55 - 157/70)  BP(mean): --  RR: 18 (11 Nov 2022 14:17) (14 - 18)  SpO2: 97% (11 Nov 2022 14:17) (97% - 100%)    Parameters below as of 11 Nov 2022 14:17  Patient On (Oxygen Delivery Method): room air    EKG: NSR @ 66 w/sinus arrythmia, T inv III

## 2022-11-12 ENCOUNTER — TRANSCRIPTION ENCOUNTER (OUTPATIENT)
Age: 72
End: 2022-11-12

## 2022-11-12 LAB
24R-OH-CALCIDIOL SERPL-MCNC: 41.3 NG/ML — SIGNIFICANT CHANGE UP (ref 30–80)
ANION GAP SERPL CALC-SCNC: 16 MMOL/L — HIGH (ref 7–14)
BASOPHILS # BLD AUTO: 0.04 K/UL — SIGNIFICANT CHANGE UP (ref 0–0.2)
BASOPHILS NFR BLD AUTO: 0.5 % — SIGNIFICANT CHANGE UP (ref 0–2)
BUN SERPL-MCNC: 39 MG/DL — HIGH (ref 7–23)
CA-I BLD-SCNC: 0.98 MMOL/L — LOW (ref 1.15–1.29)
CALCIUM SERPL-MCNC: 8 MG/DL — LOW (ref 8.4–10.5)
CHLORIDE SERPL-SCNC: 104 MMOL/L — SIGNIFICANT CHANGE UP (ref 98–107)
CHOLEST SERPL-MCNC: 156 MG/DL — SIGNIFICANT CHANGE UP
CO2 SERPL-SCNC: 21 MMOL/L — LOW (ref 22–31)
CREAT SERPL-MCNC: 1.83 MG/DL — HIGH (ref 0.5–1.3)
EGFR: 38 ML/MIN/1.73M2 — LOW
EOSINOPHIL # BLD AUTO: 0.18 K/UL — SIGNIFICANT CHANGE UP (ref 0–0.5)
EOSINOPHIL NFR BLD AUTO: 2.2 % — SIGNIFICANT CHANGE UP (ref 0–6)
GLUCOSE SERPL-MCNC: 93 MG/DL — SIGNIFICANT CHANGE UP (ref 70–99)
HCT VFR BLD CALC: 39.9 % — SIGNIFICANT CHANGE UP (ref 39–50)
HCV AB S/CO SERPL IA: 0.2 S/CO — SIGNIFICANT CHANGE UP (ref 0–0.99)
HCV AB SERPL-IMP: SIGNIFICANT CHANGE UP
HDLC SERPL-MCNC: 51 MG/DL — SIGNIFICANT CHANGE UP
HGB BLD-MCNC: 13 G/DL — SIGNIFICANT CHANGE UP (ref 13–17)
IANC: 5.64 K/UL — SIGNIFICANT CHANGE UP (ref 1.8–7.4)
IMM GRANULOCYTES NFR BLD AUTO: 0.2 % — SIGNIFICANT CHANGE UP (ref 0–0.9)
LIPID PNL WITH DIRECT LDL SERPL: 78 MG/DL — SIGNIFICANT CHANGE UP
LYMPHOCYTES # BLD AUTO: 1.59 K/UL — SIGNIFICANT CHANGE UP (ref 1–3.3)
LYMPHOCYTES # BLD AUTO: 19.1 % — SIGNIFICANT CHANGE UP (ref 13–44)
MAGNESIUM SERPL-MCNC: 2 MG/DL — SIGNIFICANT CHANGE UP (ref 1.6–2.6)
MCHC RBC-ENTMCNC: 29.6 PG — SIGNIFICANT CHANGE UP (ref 27–34)
MCHC RBC-ENTMCNC: 32.6 GM/DL — SIGNIFICANT CHANGE UP (ref 32–36)
MCV RBC AUTO: 90.9 FL — SIGNIFICANT CHANGE UP (ref 80–100)
MONOCYTES # BLD AUTO: 0.84 K/UL — SIGNIFICANT CHANGE UP (ref 0–0.9)
MONOCYTES NFR BLD AUTO: 10.1 % — SIGNIFICANT CHANGE UP (ref 2–14)
NEUTROPHILS # BLD AUTO: 5.64 K/UL — SIGNIFICANT CHANGE UP (ref 1.8–7.4)
NEUTROPHILS NFR BLD AUTO: 67.9 % — SIGNIFICANT CHANGE UP (ref 43–77)
NON HDL CHOLESTEROL: 105 MG/DL — SIGNIFICANT CHANGE UP
NRBC # BLD: 0 /100 WBCS — SIGNIFICANT CHANGE UP (ref 0–0)
NRBC # FLD: 0 K/UL — SIGNIFICANT CHANGE UP (ref 0–0)
PLATELET # BLD AUTO: 289 K/UL — SIGNIFICANT CHANGE UP (ref 150–400)
POTASSIUM SERPL-MCNC: 4.4 MMOL/L — SIGNIFICANT CHANGE UP (ref 3.5–5.3)
POTASSIUM SERPL-SCNC: 4.4 MMOL/L — SIGNIFICANT CHANGE UP (ref 3.5–5.3)
PTH-INTACT FLD-MCNC: 32 PG/ML — SIGNIFICANT CHANGE UP (ref 15–65)
RBC # BLD: 4.39 M/UL — SIGNIFICANT CHANGE UP (ref 4.2–5.8)
RBC # FLD: 13.1 % — SIGNIFICANT CHANGE UP (ref 10.3–14.5)
SODIUM SERPL-SCNC: 141 MMOL/L — SIGNIFICANT CHANGE UP (ref 135–145)
TRIGL SERPL-MCNC: 135 MG/DL — SIGNIFICANT CHANGE UP
TSH SERPL-MCNC: 1.04 UIU/ML — SIGNIFICANT CHANGE UP (ref 0.27–4.2)
VIT D25+D1,25 OH+D1,25 PNL SERPL-MCNC: 36.4 PG/ML — SIGNIFICANT CHANGE UP (ref 19.9–79.3)
WBC # BLD: 8.31 K/UL — SIGNIFICANT CHANGE UP (ref 3.8–10.5)
WBC # FLD AUTO: 8.31 K/UL — SIGNIFICANT CHANGE UP (ref 3.8–10.5)

## 2022-11-12 PROCEDURE — 99233 SBSQ HOSP IP/OBS HIGH 50: CPT

## 2022-11-12 RX ORDER — CALCIUM GLUCONATE 100 MG/ML
1 VIAL (ML) INTRAVENOUS ONCE
Refills: 0 | Status: COMPLETED | OUTPATIENT
Start: 2022-11-12 | End: 2022-11-12

## 2022-11-12 RX ADMIN — Medication 650 MILLIGRAM(S): at 18:06

## 2022-11-12 RX ADMIN — FINASTERIDE 5 MILLIGRAM(S): 5 TABLET, FILM COATED ORAL at 22:09

## 2022-11-12 RX ADMIN — AMPICILLIN SODIUM AND SULBACTAM SODIUM 200 GRAM(S): 250; 125 INJECTION, POWDER, FOR SUSPENSION INTRAMUSCULAR; INTRAVENOUS at 00:31

## 2022-11-12 RX ADMIN — Medication 650 MILLIGRAM(S): at 19:06

## 2022-11-12 RX ADMIN — Medication 100 GRAM(S): at 15:23

## 2022-11-12 RX ADMIN — AMLODIPINE BESYLATE 5 MILLIGRAM(S): 2.5 TABLET ORAL at 05:22

## 2022-11-12 RX ADMIN — Medication 1 PATCH: at 19:15

## 2022-11-12 RX ADMIN — AMPICILLIN SODIUM AND SULBACTAM SODIUM 200 GRAM(S): 250; 125 INJECTION, POWDER, FOR SUSPENSION INTRAMUSCULAR; INTRAVENOUS at 22:09

## 2022-11-12 RX ADMIN — Medication 1 PATCH: at 11:11

## 2022-11-12 RX ADMIN — AMPICILLIN SODIUM AND SULBACTAM SODIUM 200 GRAM(S): 250; 125 INJECTION, POWDER, FOR SUSPENSION INTRAMUSCULAR; INTRAVENOUS at 11:11

## 2022-11-12 NOTE — PROGRESS NOTE ADULT - ASSESSMENT
72 yr old male s/p mechanical fall to right side of face sustaining multiple right sided facial fractures. Patient will be added on for ORIF of facial fractures in the OR tomorrow 11/13 with Dr. Thompson    plan:  - please medically optimize patient for OR tomorrow  - ORIF of zygomatic fracture tomorrow Sunday 11/13  - pain control per primary  - antibiotics per primary  - full liquid diet - please make NPO at midnight  - please get AM labs tomorrow: CBC, BMP, INR   - Sinus Precautions:        - do not blow nose        - sneeze with mouth open        - do not drink through straw    Kadeem Adair  [facility] [place]  [id number]

## 2022-11-12 NOTE — PROGRESS NOTE ADULT - SUBJECTIVE AND OBJECTIVE BOX
Merlin Mathew, MD   Hospitalist  Pager #[phone]    PROGRESS NOTE:     Patient is a 72y old  Male who presents with a chief complaint of Mechanical Fall w/Right Trimalar Fracture (12 Nov 2022 10:33)      SUBJECTIVE / OVERNIGHT EVENTS: NEON   Patient feels fine, facial swelling and pain have improved   Patient denies any lightheadedness/SOB, CP, palpitations prior to fall, any LOC prior to fall. Was walking back to work, stepped on untied shoelaces, missed the curb and then hit his head. Believes he LOC for a brief moment and woke up with ringing in his ears.       ADDITIONAL REVIEW OF SYSTEMS:    MEDICATIONS  (STANDING):  amLODIPine   Tablet 5 milliGRAM(s) Oral daily  ampicillin/sulbactam  IVPB 3 Gram(s) IV Intermittent every 12 hours  calcium gluconate IVPB 1 Gram(s) IV Intermittent once  finasteride 5 milliGRAM(s) Oral daily  influenza  Vaccine (HIGH DOSE) 0.7 milliLiter(s) IntraMuscular once  nicotine -  14 mG/24Hr(s) Patch 1 Patch Transdermal daily    MEDICATIONS  (PRN):  acetaminophen     Tablet .. 650 milliGRAM(s) Oral every 6 hours PRN Temp greater or equal to 38C (100.4F), Mild Pain (1 - 3)  oxyCODONE    IR 5 milliGRAM(s) Oral every 8 hours PRN Moderate Pain (4 - 6)      CAPILLARY BLOOD GLUCOSE        I&O's Summary      PHYSICAL EXAM:  Vital Signs Last 24 Hrs  T(C): 36.6 (12 Nov 2022 09:52), Max: 37 (11 Nov 2022 17:45)  T(F): 97.8 (12 Nov 2022 09:52), Max: 98.6 (11 Nov 2022 17:45)  HR: 79 (12 Nov 2022 09:52) (70 - 79)  BP: 121/61 (12 Nov 2022 09:52) (121/56 - 150/54)  BP(mean): --  RR: 17 (12 Nov 2022 09:52) (17 - 18)  SpO2: 96% (12 Nov 2022 09:52) (96% - 100%)    Parameters below as of 12 Nov 2022 09:52  Patient On (Oxygen Delivery Method): room air        CONSTITUTIONAL: NAD, well-developed male with facial swelling and bruising   RESPIRATORY: Normal respiratory effort; lungs are clear to auscultation bilaterally  CARDIOVASCULAR: Regular rate and rhythm, normal S1 and S2, no murmur/rub/gallop; No lower extremity edema; Peripheral pulses are 2+ bilaterally  ABDOMEN: Nontender to palpation, normoactive bowel sounds, no rebound/guarding; No hepatosplenomegaly  MUSCULOSKELETAL: Clubbing, arthritic joints  no tenderness to palpation  PSYCH: A+O to person, place, and time; affect appropriate    LABS:                        13.0   8.31  )-----------( 289      ( 12 Nov 2022 05:47 )             39.9     11-12    141  |  104  |  39<H>  ----------------------------<  93  4.4   |  21<L>  |  1.83<H>    Ca    8.0<L>      12 Nov 2022 05:47  Mg     2.00     11-12    TPro  7.3  /  Alb  4.3  /  TBili  0.4  /  DBili  x   /  AST  28  /  ALT  16  /  AlkPhos  62  11-11    PT/INR - ( 11 Nov 2022 08:30 )   PT: 12.0 sec;   INR: 1.03 ratio         PTT - ( 11 Nov 2022 08:30 )  PTT:26.4 sec            RADIOLOGY & ADDITIONAL TESTS:  Results Reviewed:   Imaging Personally Reviewed:  Electrocardiogram Personally Reviewed:    COORDINATION OF CARE:  Care Discussed with Consultants/Other Providers [Y/N]:  Prior or Outpatient Records Reviewed [Y/N]:

## 2022-11-12 NOTE — PROGRESS NOTE ADULT - ASSESSMENT
71 y/o Male, with a PmHx of COPD, Anemia, BPH, HTN, right urothelial carcinoma s/p right nephroureterectomy, bladder cancer, left ureteral stent for ureteral obstruction (goes every 6 months for a stent exchange - last was 9/2022), CKD, was transferred from [facility] with multiple facial fractures after mechanical fall.  Admitted to medicine for a right Lateral Wall Fracture/Trimalar Fracture.

## 2022-11-12 NOTE — PROGRESS NOTE ADULT - SUBJECTIVE AND OBJECTIVE BOX
Patient is a 72y old  Male who presents with a chief complaint of a mechanical fall. Patient was transferred from [facility].     22 HD2  patient visited bedside on admitting floor. MALLIKA RAGLAND. patient's swelling has decreased slightly compared to yesterday. Patient will be scheduled for ORIF of facial fractures tomorrow  in the OR with Dr. Thompson.     HPI: 73yM with past medical history of COPD, anemia, HTN, right urothelial carcinoma s/p right nephroureterectomy, bladder cancer, left ureteral stent for ureteral obstruction, sent as a transfer from outside hospital with multiple facial fractures after mechanical fall. Patient states last night he tripped on his shoelace fell forward hitting the right side of his face on the curb. He believes he may have passed out for a few moments. Had a CT scan performed at outside hospital (CT head without evidence of ICH), CT face which shows a right orbit lateral wall fracture with fragments that protrude into the lateral aspect of the orbit and indents the lateral rectus muscle, findings also concerning for a trimalar fracture. Patient transferred for OMFS evaluation. Patient currently endorses soreness to the right side of the face as well as right shoulder pain. Patient denies dizziness changes to his vision numbness tingling weakness chest pain shortness of breath palpitations abdominal pain nausea vomiting difficulty with memory recent travel or illness or any other concerns.    PAST MEDICAL & SURGICAL HISTORY:  Bladder cancer x 20 yrs  HTN (hypertension)  Prediabetes  Enlarged prostate  Ureter cancer, right 2017 no chemo  Current every day smoker  Arthritis  Emphysema lung diagnosed ct scan   Right inguinal hernia  Anemia    PSH:  H/O right nephrectomy 2017  History of bilateral cataract extraction  S/P ureteral stent placement many times - q6 month exchange  History of prostate surgery laser with cystoscopy  H/O hand surgery right    MEDICATIONS  (STANDING):  MEDICATIONS  (PRN):    Allergies: mitomycin (Hives; Rash)    FAMILY HISTORY:  FH: lung cancer  Mother    FH: type 2 diabetes  Father      *SOCIAL HISTORY: Denies ETOH use. drug use. current every day smoker    Review of systems:  denies fever, chills.   + LOC for a few seconds after fall. patient reports regaining consciousness quickly after.   denies nausea/vomiting/headache. denies CP, SOB, cough. Denies palpatitions. denies blurred/double vision. denies dyspahgia, dyspnea.    Vital Signs Last 24 Hrs  T(C): 36.6 (2022 09:52), Max: 37 (2022 17:45)  T(F): 97.8 (2022 09:52), Max: 98.6 (2022 17:45)  HR: 79 (2022 09:52) (70 - 79)  BP: 121/61 (2022 09:52) (121/56 - 151/89)  BP(mean): --  RR: 17 (2022 09:52) (14 - 18)  SpO2: 96% (2022 09:52) (96% - 100%)    Parameters below as of 2022 09:52  Patient On (Oxygen Delivery Method): room air    PHYSICAL EXAM:    Gen: AAox3, NAD, NC/AT  Head: right sided facial abrasion with dried blood. currently hemostatic.     Eyes: EOMI, PERRL, visual acuity intact, no diplopia. EOM without pain or restriction. right periorbital edema present. right periorbital ecchymosis present. right lateral orbital wall step deformity palpated. right infraorbital rim step deformity palpated.     Ears: Gross hearing intact, No heme appreciated, Condylar head palpated    Nose: no crepitis/septal hematoma/asymmetry.  no Lacerations/abrasions/hematomas.    Malar: right malar depression palpable, endorses CN V-2 paresthesia    Throat: no LAD, supple, FROM, no lesions  Extraoral/Intraoral Exam: CHEL: 40, patient completely edentulate  no lacerations/abrasions/hematomas. right buccal ecchymosis present. no mandibular step deformity, no CN V-3 paresthesia, no signs of infection,  FOM soft, NT. no mobility of maxilla/crepitis. TMJ: no clicking/popping    right maxillary buccal vestibule can be palpated for malar fracture.     CN II-XII intact    LABS:                          13.0   8.31  )-----------( 289      ( 2022 05:47 )             39.9     11-12    141  |  104  |  39<H>  ----------------------------<  93  4.4   |  21<L>  |  1.83<H>    Ca    8.0<L>      2022 05:47  Mg     2.00         TPro  7.3  /  Alb  4.3  /  TBili  0.4  /  DBili  x   /  AST  28  /  ALT  16  /  AlkPhos  62      PT/INR - ( 2022 08:30 )   PT: 12.0 sec;   INR: 1.03 ratio         PTT - ( 2022 08:30 )  PTT:26.4 sec      IMAGIN. fracture of lateral wall of right orbit. the fracture protrudes into the lateral aspect of the orbit and indents the lateral rectus.   2. fracture of the lateral wall of the right orbit  3. fracture of the anterior wall of the right maxillary sinus as well as the lateral wall this is in combination with the depressed mid zygomatic arch fracture on the right and widening of the right frontal zygomatic sutures is consistent with the trimalar fracture.

## 2022-11-13 ENCOUNTER — TRANSCRIPTION ENCOUNTER (OUTPATIENT)
Age: 72
End: 2022-11-13

## 2022-11-13 LAB
ANION GAP SERPL CALC-SCNC: 11 MMOL/L — SIGNIFICANT CHANGE UP (ref 7–14)
APTT BLD: 26.9 SEC — LOW (ref 27–36.3)
BASOPHILS # BLD AUTO: 0.04 K/UL — SIGNIFICANT CHANGE UP (ref 0–0.2)
BASOPHILS NFR BLD AUTO: 0.5 % — SIGNIFICANT CHANGE UP (ref 0–2)
BUN SERPL-MCNC: 32 MG/DL — HIGH (ref 7–23)
CALCIUM SERPL-MCNC: 8.4 MG/DL — SIGNIFICANT CHANGE UP (ref 8.4–10.5)
CHLORIDE SERPL-SCNC: 102 MMOL/L — SIGNIFICANT CHANGE UP (ref 98–107)
CO2 SERPL-SCNC: 24 MMOL/L — SIGNIFICANT CHANGE UP (ref 22–31)
CREAT SERPL-MCNC: 1.88 MG/DL — HIGH (ref 0.5–1.3)
EGFR: 37 ML/MIN/1.73M2 — LOW
EOSINOPHIL # BLD AUTO: 0.23 K/UL — SIGNIFICANT CHANGE UP (ref 0–0.5)
EOSINOPHIL NFR BLD AUTO: 2.7 % — SIGNIFICANT CHANGE UP (ref 0–6)
GLUCOSE SERPL-MCNC: 122 MG/DL — HIGH (ref 70–99)
HCT VFR BLD CALC: 40.1 % — SIGNIFICANT CHANGE UP (ref 39–50)
HGB BLD-MCNC: 12.8 G/DL — LOW (ref 13–17)
IANC: 5.64 K/UL — SIGNIFICANT CHANGE UP (ref 1.8–7.4)
IMM GRANULOCYTES NFR BLD AUTO: 0.2 % — SIGNIFICANT CHANGE UP (ref 0–0.9)
INR BLD: 1.04 RATIO — SIGNIFICANT CHANGE UP (ref 0.88–1.16)
LYMPHOCYTES # BLD AUTO: 1.74 K/UL — SIGNIFICANT CHANGE UP (ref 1–3.3)
LYMPHOCYTES # BLD AUTO: 20.5 % — SIGNIFICANT CHANGE UP (ref 13–44)
MAGNESIUM SERPL-MCNC: 1.9 MG/DL — SIGNIFICANT CHANGE UP (ref 1.6–2.6)
MCHC RBC-ENTMCNC: 29 PG — SIGNIFICANT CHANGE UP (ref 27–34)
MCHC RBC-ENTMCNC: 31.9 GM/DL — LOW (ref 32–36)
MCV RBC AUTO: 90.9 FL — SIGNIFICANT CHANGE UP (ref 80–100)
MONOCYTES # BLD AUTO: 0.83 K/UL — SIGNIFICANT CHANGE UP (ref 0–0.9)
MONOCYTES NFR BLD AUTO: 9.8 % — SIGNIFICANT CHANGE UP (ref 2–14)
NEUTROPHILS # BLD AUTO: 5.64 K/UL — SIGNIFICANT CHANGE UP (ref 1.8–7.4)
NEUTROPHILS NFR BLD AUTO: 66.3 % — SIGNIFICANT CHANGE UP (ref 43–77)
NRBC # BLD: 0 /100 WBCS — SIGNIFICANT CHANGE UP (ref 0–0)
NRBC # FLD: 0 K/UL — SIGNIFICANT CHANGE UP (ref 0–0)
PLATELET # BLD AUTO: 268 K/UL — SIGNIFICANT CHANGE UP (ref 150–400)
POTASSIUM SERPL-MCNC: 4.5 MMOL/L — SIGNIFICANT CHANGE UP (ref 3.5–5.3)
POTASSIUM SERPL-SCNC: 4.5 MMOL/L — SIGNIFICANT CHANGE UP (ref 3.5–5.3)
PROTHROM AB SERPL-ACNC: 12.1 SEC — SIGNIFICANT CHANGE UP (ref 10.5–13.4)
RBC # BLD: 4.41 M/UL — SIGNIFICANT CHANGE UP (ref 4.2–5.8)
RBC # FLD: 12.9 % — SIGNIFICANT CHANGE UP (ref 10.3–14.5)
SODIUM SERPL-SCNC: 137 MMOL/L — SIGNIFICANT CHANGE UP (ref 135–145)
WBC # BLD: 8.5 K/UL — SIGNIFICANT CHANGE UP (ref 3.8–10.5)
WBC # FLD AUTO: 8.5 K/UL — SIGNIFICANT CHANGE UP (ref 3.8–10.5)

## 2022-11-13 PROCEDURE — 99233 SBSQ HOSP IP/OBS HIGH 50: CPT

## 2022-11-13 DEVICE — SCREW AXS SELF TAP CRS 1.9X5MM: Type: IMPLANTABLE DEVICE | Site: RIGHT | Status: FUNCTIONAL

## 2022-11-13 DEVICE — PLATE L 6H 100 DEG 8MM RT: Type: IMPLANTABLE DEVICE | Site: RIGHT | Status: FUNCTIONAL

## 2022-11-13 DEVICE — LIGATING CLIPS WECK HORIZON MEDIUM (BLUE) 24: Type: IMPLANTABLE DEVICE | Site: RIGHT | Status: FUNCTIONAL

## 2022-11-13 DEVICE — PLATE 5H RT 5MM 100D: Type: IMPLANTABLE DEVICE | Site: RIGHT | Status: FUNCTIONAL

## 2022-11-13 DEVICE — IMPLANTABLE DEVICE: Type: IMPLANTABLE DEVICE | Site: RIGHT | Status: FUNCTIONAL

## 2022-11-13 DEVICE — SCREW AXS SELF TAP 1.7X5MM: Type: IMPLANTABLE DEVICE | Site: RIGHT | Status: FUNCTIONAL

## 2022-11-13 DEVICE — PLATE CVD 10H: Type: IMPLANTABLE DEVICE | Site: RIGHT | Status: FUNCTIONAL

## 2022-11-13 DEVICE — SCREW AXS SELF DRILL 1.7X4MM: Type: IMPLANTABLE DEVICE | Site: RIGHT | Status: FUNCTIONAL

## 2022-11-13 DEVICE — SURGICEL 2 X 14": Type: IMPLANTABLE DEVICE | Site: RIGHT | Status: FUNCTIONAL

## 2022-11-13 DEVICE — SCREW AXS SELF TAP 1.2X4MM: Type: IMPLANTABLE DEVICE | Site: RIGHT | Status: FUNCTIONAL

## 2022-11-13 RX ORDER — HYDROMORPHONE HYDROCHLORIDE 2 MG/ML
0.25 INJECTION INTRAMUSCULAR; INTRAVENOUS; SUBCUTANEOUS
Refills: 0 | Status: DISCONTINUED | OUTPATIENT
Start: 2022-11-13 | End: 2022-11-13

## 2022-11-13 RX ORDER — AMPICILLIN SODIUM AND SULBACTAM SODIUM 250; 125 MG/ML; MG/ML
3 INJECTION, POWDER, FOR SUSPENSION INTRAMUSCULAR; INTRAVENOUS EVERY 12 HOURS
Refills: 0 | Status: DISCONTINUED | OUTPATIENT
Start: 2022-11-13 | End: 2022-11-15

## 2022-11-13 RX ORDER — AMPICILLIN SODIUM AND SULBACTAM SODIUM 250; 125 MG/ML; MG/ML
3 INJECTION, POWDER, FOR SUSPENSION INTRAMUSCULAR; INTRAVENOUS EVERY 6 HOURS
Refills: 0 | Status: DISCONTINUED | OUTPATIENT
Start: 2022-11-13 | End: 2022-11-13

## 2022-11-13 RX ORDER — ONDANSETRON 8 MG/1
4 TABLET, FILM COATED ORAL ONCE
Refills: 0 | Status: DISCONTINUED | OUTPATIENT
Start: 2022-11-13 | End: 2022-11-13

## 2022-11-13 RX ORDER — BACITRACIN 500 [USP'U]/G
1 OINTMENT OPHTHALMIC
Refills: 0 | Status: DISCONTINUED | OUTPATIENT
Start: 2022-11-13 | End: 2022-11-14

## 2022-11-13 RX ORDER — HYDROMORPHONE HYDROCHLORIDE 2 MG/ML
0.5 INJECTION INTRAMUSCULAR; INTRAVENOUS; SUBCUTANEOUS
Refills: 0 | Status: DISCONTINUED | OUTPATIENT
Start: 2022-11-13 | End: 2022-11-13

## 2022-11-13 RX ADMIN — AMPICILLIN SODIUM AND SULBACTAM SODIUM 200 GRAM(S): 250; 125 INJECTION, POWDER, FOR SUSPENSION INTRAMUSCULAR; INTRAVENOUS at 18:09

## 2022-11-13 RX ADMIN — AMLODIPINE BESYLATE 5 MILLIGRAM(S): 2.5 TABLET ORAL at 05:08

## 2022-11-13 RX ADMIN — FINASTERIDE 5 MILLIGRAM(S): 5 TABLET, FILM COATED ORAL at 21:13

## 2022-11-13 NOTE — PHYSICAL THERAPY INITIAL EVALUATION ADULT - PERTINENT HX OF CURRENT PROBLEM, REHAB EVAL
73 y/o Male, with a PmHx of COPD, Anemia, BPH, HTN, right urothelial carcinoma s/p right nephroureterectomy, bladder cancer, left ureteral stent for ureteral obstruction (goes every 6 months for a stent exchange - last was 9/2022), CKD, was transferred from [facility] with multiple facial fractures after mechanical fall.  Admitted to medicine for a right Lateral Wall Fracture/Trimalar Fracture.

## 2022-11-13 NOTE — PROGRESS NOTE ADULT - SUBJECTIVE AND OBJECTIVE BOX
[facility]  Division of Hospital Medicine  Brisa Ha MD  Pager: [phone]      Patient is a 72y old  Male who presents with a chief complaint of Mechanical Fall w/Right Trimalar Fracture (13 Nov 2022 07:59)      SUBJECTIVE / OVERNIGHT EVENTS:  ADDITIONAL REVIEW OF SYSTEMS:    MEDICATIONS  (STANDING):  amLODIPine   Tablet 5 milliGRAM(s) Oral daily  ampicillin/sulbactam  IVPB 3 Gram(s) IV Intermittent every 6 hours  bacitracin   Ophthalmic Ointment 1 Application(s) Right EYE two times a day  finasteride 5 milliGRAM(s) Oral daily  influenza  Vaccine (HIGH DOSE) 0.7 milliLiter(s) IntraMuscular once  nicotine -  14 mG/24Hr(s) Patch 1 Patch Transdermal daily    MEDICATIONS  (PRN):  acetaminophen     Tablet .. 650 milliGRAM(s) Oral every 6 hours PRN Temp greater or equal to 38C (100.4F), Mild Pain (1 - 3)  HYDROmorphone  Injectable 0.25 milliGRAM(s) IV Push every 10 minutes PRN Moderate Pain (4 - 6)  HYDROmorphone  Injectable 0.5 milliGRAM(s) IV Push every 10 minutes PRN Severe Pain (7 - 10)  ondansetron Injectable 4 milliGRAM(s) IV Push once PRN Nausea and/or Vomiting  oxyCODONE    IR 5 milliGRAM(s) Oral every 8 hours PRN Moderate Pain (4 - 6)      CAPILLARY BLOOD GLUCOSE        I&O's Summary      PHYSICAL EXAM:  Vital Signs Last 24 Hrs  T(C): 36.9 (13 Nov 2022 12:00), Max: 36.9 (13 Nov 2022 12:00)  T(F): 98.4 (13 Nov 2022 12:00), Max: 98.4 (13 Nov 2022 12:00)  HR: 91 (13 Nov 2022 12:30) (73 - 101)  BP: 102/44 (13 Nov 2022 12:30) (95/61 - 160/69)  BP(mean): 67 (13 Nov 2022 12:15) (62 - 68)  RR: 17 (13 Nov 2022 12:30) (14 - 18)  SpO2: 93% (13 Nov 2022 12:30) (93% - 100%)    Parameters below as of 13 Nov 2022 12:05  Patient On (Oxygen Delivery Method): nasal cannula  O2 Flow (L/min): 2    CONSTITUTIONAL: NAD, well-developed, well-groomed  EYES: PERRLA; conjunctiva and sclera clear  ENMT: Moist oral mucosa, no pharyngeal injection or exudates; normal dentition  NECK: Supple, no palpable masses; no thyromegaly  RESPIRATORY: Normal respiratory effort; lungs are clear to auscultation bilaterally  CARDIOVASCULAR: Regular rate and rhythm, normal S1 and S2, no murmur/rub/gallop; No lower extremity edema; Peripheral pulses are 2+ bilaterally  ABDOMEN: Nontender to palpation, normoactive bowel sounds, no rebound/guarding; No hepatosplenomegaly  MUSCULOSKELETAL:  Normal gait; no clubbing or cyanosis of digits; no joint swelling or tenderness to palpation  PSYCH: A+O to person, place, and time; affect appropriate  NEUROLOGY: CN 2-12 are intact and symmetric; no gross sensory deficits   SKIN: No rashes; no palpable lesions    LABS:                        12.8   8.50  )-----------( 268      ( 13 Nov 2022 04:35 )             40.1     11-13    137  |  102  |  32<H>  ----------------------------<  122<H>  4.5   |  24  |  1.88<H>    Ca    8.4      13 Nov 2022 04:35  Mg     1.90     11-13      PT/INR - ( 13 Nov 2022 04:35 )   PT: 12.1 sec;   INR: 1.04 ratio         PTT - ( 13 Nov 2022 04:35 )  PTT:26.9 sec            RADIOLOGY & ADDITIONAL TESTS:  Results Reviewed:   Imaging Personally Reviewed:  Electrocardiogram Personally Reviewed:    COORDINATION OF CARE:  Care Discussed with Consultants/Other Providers [Y/N]:  Prior or Outpatient Records Reviewed [Y/N]:   [facility]  Division of Hospital Medicine  Brisa Ha MD  Pager: [phone]      Patient is a 72y old  Male who presents with a chief complaint of Mechanical Fall w/Right Trimalar Fracture (13 Nov 2022 07:59)      SUBJECTIVE / OVERNIGHT EVENTS: patient is s/p OR. With reabsorbable sutures. No medical concerns at this time. On IV pain meds.   ADDITIONAL REVIEW OF SYSTEMS:    MEDICATIONS  (STANDING):  amLODIPine   Tablet 5 milliGRAM(s) Oral daily  ampicillin/sulbactam  IVPB 3 Gram(s) IV Intermittent every 6 hours  bacitracin   Ophthalmic Ointment 1 Application(s) Right EYE two times a day  finasteride 5 milliGRAM(s) Oral daily  influenza  Vaccine (HIGH DOSE) 0.7 milliLiter(s) IntraMuscular once  nicotine -  14 mG/24Hr(s) Patch 1 Patch Transdermal daily    MEDICATIONS  (PRN):  acetaminophen     Tablet .. 650 milliGRAM(s) Oral every 6 hours PRN Temp greater or equal to 38C (100.4F), Mild Pain (1 - 3)  HYDROmorphone  Injectable 0.25 milliGRAM(s) IV Push every 10 minutes PRN Moderate Pain (4 - 6)  HYDROmorphone  Injectable 0.5 milliGRAM(s) IV Push every 10 minutes PRN Severe Pain (7 - 10)  ondansetron Injectable 4 milliGRAM(s) IV Push once PRN Nausea and/or Vomiting  oxyCODONE    IR 5 milliGRAM(s) Oral every 8 hours PRN Moderate Pain (4 - 6)      CAPILLARY BLOOD GLUCOSE        I&O's Summary      PHYSICAL EXAM:  Vital Signs Last 24 Hrs  T(C): 36.9 (13 Nov 2022 12:00), Max: 36.9 (13 Nov 2022 12:00)  T(F): 98.4 (13 Nov 2022 12:00), Max: 98.4 (13 Nov 2022 12:00)  HR: 91 (13 Nov 2022 12:30) (73 - 101)  BP: 102/44 (13 Nov 2022 12:30) (95/61 - 160/69)  BP(mean): 67 (13 Nov 2022 12:15) (62 - 68)  RR: 17 (13 Nov 2022 12:30) (14 - 18)  SpO2: 93% (13 Nov 2022 12:30) (93% - 100%)    Parameters below as of 13 Nov 2022 12:05  Patient On (Oxygen Delivery Method): nasal cannula  O2 Flow (L/min): 2    CONSTITUTIONAL: NAD, well-developed male with facial swelling and bruising   RESPIRATORY: Normal respiratory effort; lungs are clear to auscultation bilaterally  CARDIOVASCULAR: Regular rate and rhythm, normal S1 and S2, no murmur/rub/gallop; No lower extremity edema; Peripheral pulses are 2+ bilaterally  ABDOMEN: Nontender to palpation, normoactive bowel sounds, no rebound/guarding; No hepatosplenomegaly  MUSCULOSKELETAL: Clubbing, arthritic joints  no tenderness to palpation  PSYCH: A+O to person, place, and time; affect appropriate    LABS:                        12.8   8.50  )-----------( 268      ( 13 Nov 2022 04:35 )             40.1     11-13    137  |  102  |  32<H>  ----------------------------<  122<H>  4.5   |  24  |  1.88<H>    Ca    8.4      13 Nov 2022 04:35  Mg     1.90     11-13      PT/INR - ( 13 Nov 2022 04:35 )   PT: 12.1 sec;   INR: 1.04 ratio         PTT - ( 13 Nov 2022 04:35 )  PTT:26.9 sec            RADIOLOGY & ADDITIONAL TESTS:  Results Reviewed:   Imaging Personally Reviewed:  Electrocardiogram Personally Reviewed:    COORDINATION OF CARE:  Care Discussed with Consultants/Other Providers [Y/N]:  Prior or Outpatient Records Reviewed [Y/N]:

## 2022-11-13 NOTE — PROGRESS NOTE ADULT - ASSESSMENT
72 yr old male s/p mechanical fall to right side of face sustaining multiple right sided facial fractures. Planned for ORIF of facial fractures in the OR this morning 11/13 with Dr. Thompson    Plan:  - ORIF of zygomatic fracture today   - Pain control per primary  - Antibiotics per primary  - NPO until further notice   - Sinus Precautions:        - do not blow nose        - sneeze with mouth open        - do not drink through straw    Yeshin Warren  [facility]  [id number]

## 2022-11-13 NOTE — BRIEF OPERATIVE NOTE - OPERATION/FINDINGS
Open reduction internal fixation Zygomaticomaxillary Complex fracture and reduction of zygomatic arch fracture via sub-ciliary, lateral brow, and keen approach. All sutures resorbable. Steristrip at right lateral brow. Post op vision exam intact. Open reduction internal fixation Right Zygomaticomaxillary Complex fracture and reduction of zygomatic arch fracture via sub-ciliary, lateral brow, and keen approach. All sutures resorbable. Steristrip at right lateral brow. Post op vision exam intact.

## 2022-11-13 NOTE — PROGRESS NOTE ADULT - SUBJECTIVE AND OBJECTIVE BOX
11/13/22 HD3  Patient visited bedside on admitting floor. MALLIKA RAGLAND. NPO after midnight. Planned for OR today for ORIF of facial fractures with Dr. Thompson.     HPI: 73yM with past medical history of COPD, anemia, HTN, right urothelial carcinoma s/p right nephroureterectomy, bladder cancer, left ureteral stent for ureteral obstruction, sent as a transfer from outside hospital with multiple facial fractures after mechanical fall. Patient states last night he tripped on his shoelace fell forward hitting the right side of his face on the curb. He believes he may have passed out for a few moments. Had a CT scan performed at outside hospital (CT head without evidence of ICH), CT face which shows a right orbit lateral wall fracture with fragments that protrude into the lateral aspect of the orbit and indents the lateral rectus muscle, findings also concerning for a trimalar fracture. Patient transferred for OMFS evaluation. Patient currently endorses soreness to the right side of the face as well as right shoulder pain. Patient denies dizziness changes to his vision numbness tingling weakness chest pain shortness of breath palpitations abdominal pain nausea vomiting difficulty with memory recent travel or illness or any other concerns.    PAST MEDICAL & SURGICAL HISTORY:  Bladder cancer x 20 yrs  HTN (hypertension)  Prediabetes  Enlarged prostate  Ureter cancer, right 2017 no chemo  Current every day smoker  Arthritis  Emphysema lung diagnosed ct scan 2020  Right inguinal hernia  Anemia    PSH:  H/O right nephrectomy 2017  History of bilateral cataract extraction  S/P ureteral stent placement many times - q6 month exchange  History of prostate surgery laser with cystoscopy  H/O hand surgery right    MEDICATIONS  (STANDING):  MEDICATIONS  (PRN):    Allergies: mitomycin (Hives; Rash)    FAMILY HISTORY:  FH: lung cancer  Mother    FH: type 2 diabetes  Father      *SOCIAL HISTORY: Denies ETOH use. drug use. current every day smoker    Review of systems:  denies fever, chills.   + LOC for a few seconds after fall. patient reports regaining consciousness quickly after.   denies nausea/vomiting/headache. denies CP, SOB, cough. Denies palpatitions. denies blurred/double vision. denies dyspahgia, dyspnea.    ICU Vital Signs Last 24 Hrs  T(C): 36.7 (13 Nov 2022 07:27), Max: 36.7 (13 Nov 2022 05:05)  T(F): 98.1 (13 Nov 2022 07:27), Max: 98.1 (13 Nov 2022 05:05)  HR: 79 (13 Nov 2022 07:27) (73 - 81)  BP: 160/69 (13 Nov 2022 07:27) (117/56 - 160/69)  BP(mean): --  ABP: --  ABP(mean): --  RR: 18 (13 Nov 2022 07:27) (17 - 18)  SpO2: 97% (13 Nov 2022 07:27) (96% - 100%)    O2 Parameters below as of 13 Nov 2022 07:27  Patient On (Oxygen Delivery Method): room air

## 2022-11-13 NOTE — BRIEF OPERATIVE NOTE - NSICDXBRIEFPROCEDURE_GEN_ALL_CORE_FT
PROCEDURES:  Open reduction and internal fixation of right zygoma 13-Nov-2022 12:14:40  Geena Maurer

## 2022-11-13 NOTE — PHYSICAL THERAPY INITIAL EVALUATION ADULT - ADDITIONAL COMMENTS
Patient lives with son in private home, + steps to negotiate. Patient was independent in all ADL prior to admission. patient was not using an assistive device prior to admission.

## 2022-11-13 NOTE — PROVIDER CONTACT NOTE (MEDICATION) - SITUATION
Pharmacy states bacitracin opthalmic ointment is not available and is out of stock in the hospital. Pharmacist states pharmacy is trying to obtain some opthalmic bacitracin form another hospital and will deliver to unit when available. Pt's bacitracin order cannot be performed.

## 2022-11-13 NOTE — PROGRESS NOTE ADULT - ASSESSMENT
71 y/o Male, with a PmHx of COPD, Anemia, BPH, HTN, right urothelial carcinoma s/p right nephroureterectomy, bladder cancer, left ureteral stent for ureteral obstruction (goes every 6 months for a stent exchange - last was 9/2022), CKD, was transferred from [facility] with multiple facial fractures after mechanical fall.  Admitted to medicine for a right Lateral Wall Fracture/Trimalar Fracture.

## 2022-11-14 ENCOUNTER — TRANSCRIPTION ENCOUNTER (OUTPATIENT)
Age: 72
End: 2022-11-14

## 2022-11-14 LAB
ANION GAP SERPL CALC-SCNC: 12 MMOL/L — SIGNIFICANT CHANGE UP (ref 7–14)
BASOPHILS # BLD AUTO: 0.03 K/UL — SIGNIFICANT CHANGE UP (ref 0–0.2)
BASOPHILS NFR BLD AUTO: 0.3 % — SIGNIFICANT CHANGE UP (ref 0–2)
BUN SERPL-MCNC: 29 MG/DL — HIGH (ref 7–23)
CALCIUM SERPL-MCNC: 7.7 MG/DL — LOW (ref 8.4–10.5)
CHLORIDE SERPL-SCNC: 102 MMOL/L — SIGNIFICANT CHANGE UP (ref 98–107)
CO2 SERPL-SCNC: 24 MMOL/L — SIGNIFICANT CHANGE UP (ref 22–31)
CREAT SERPL-MCNC: 1.99 MG/DL — HIGH (ref 0.5–1.3)
EGFR: 35 ML/MIN/1.73M2 — LOW
EOSINOPHIL # BLD AUTO: 0.07 K/UL — SIGNIFICANT CHANGE UP (ref 0–0.5)
EOSINOPHIL NFR BLD AUTO: 0.7 % — SIGNIFICANT CHANGE UP (ref 0–6)
GLUCOSE SERPL-MCNC: 94 MG/DL — SIGNIFICANT CHANGE UP (ref 70–99)
HCT VFR BLD CALC: 34.1 % — LOW (ref 39–50)
HGB BLD-MCNC: 11.2 G/DL — LOW (ref 13–17)
IANC: 7.7 K/UL — HIGH (ref 1.8–7.4)
IMM GRANULOCYTES NFR BLD AUTO: 0.3 % — SIGNIFICANT CHANGE UP (ref 0–0.9)
LYMPHOCYTES # BLD AUTO: 1.44 K/UL — SIGNIFICANT CHANGE UP (ref 1–3.3)
LYMPHOCYTES # BLD AUTO: 13.8 % — SIGNIFICANT CHANGE UP (ref 13–44)
MAGNESIUM SERPL-MCNC: 1.8 MG/DL — SIGNIFICANT CHANGE UP (ref 1.6–2.6)
MCHC RBC-ENTMCNC: 29.4 PG — SIGNIFICANT CHANGE UP (ref 27–34)
MCHC RBC-ENTMCNC: 32.8 GM/DL — SIGNIFICANT CHANGE UP (ref 32–36)
MCV RBC AUTO: 89.5 FL — SIGNIFICANT CHANGE UP (ref 80–100)
MONOCYTES # BLD AUTO: 1.15 K/UL — HIGH (ref 0–0.9)
MONOCYTES NFR BLD AUTO: 11 % — SIGNIFICANT CHANGE UP (ref 2–14)
NEUTROPHILS # BLD AUTO: 7.7 K/UL — HIGH (ref 1.8–7.4)
NEUTROPHILS NFR BLD AUTO: 73.9 % — SIGNIFICANT CHANGE UP (ref 43–77)
NRBC # BLD: 0 /100 WBCS — SIGNIFICANT CHANGE UP (ref 0–0)
NRBC # FLD: 0 K/UL — SIGNIFICANT CHANGE UP (ref 0–0)
PHOSPHATE SERPL-MCNC: 3.5 MG/DL — SIGNIFICANT CHANGE UP (ref 2.5–4.5)
PLATELET # BLD AUTO: 240 K/UL — SIGNIFICANT CHANGE UP (ref 150–400)
POTASSIUM SERPL-MCNC: 4 MMOL/L — SIGNIFICANT CHANGE UP (ref 3.5–5.3)
POTASSIUM SERPL-SCNC: 4 MMOL/L — SIGNIFICANT CHANGE UP (ref 3.5–5.3)
RBC # BLD: 3.81 M/UL — LOW (ref 4.2–5.8)
RBC # FLD: 13 % — SIGNIFICANT CHANGE UP (ref 10.3–14.5)
SODIUM SERPL-SCNC: 138 MMOL/L — SIGNIFICANT CHANGE UP (ref 135–145)
WBC # BLD: 10.42 K/UL — SIGNIFICANT CHANGE UP (ref 3.8–10.5)
WBC # FLD AUTO: 10.42 K/UL — SIGNIFICANT CHANGE UP (ref 3.8–10.5)

## 2022-11-14 PROCEDURE — 99239 HOSP IP/OBS DSCHRG MGMT >30: CPT

## 2022-11-14 RX ORDER — ERYTHROMYCIN BASE 5 MG/GRAM
1 OINTMENT (GRAM) OPHTHALMIC (EYE)
Refills: 0 | Status: DISCONTINUED | OUTPATIENT
Start: 2022-11-14 | End: 2022-11-15

## 2022-11-14 RX ORDER — ACETAMINOPHEN 500 MG
2 TABLET ORAL
Qty: 112 | Refills: 0
Start: 2022-11-14 | End: 2022-11-27

## 2022-11-14 RX ORDER — NICOTINE POLACRILEX 2 MG
1 GUM BUCCAL
Qty: 30 | Refills: 0
Start: 2022-11-14 | End: 2022-12-13

## 2022-11-14 RX ADMIN — AMLODIPINE BESYLATE 5 MILLIGRAM(S): 2.5 TABLET ORAL at 05:07

## 2022-11-14 RX ADMIN — FINASTERIDE 5 MILLIGRAM(S): 5 TABLET, FILM COATED ORAL at 21:23

## 2022-11-14 RX ADMIN — AMPICILLIN SODIUM AND SULBACTAM SODIUM 200 GRAM(S): 250; 125 INJECTION, POWDER, FOR SUSPENSION INTRAMUSCULAR; INTRAVENOUS at 05:07

## 2022-11-14 RX ADMIN — Medication 1 APPLICATION(S): at 17:05

## 2022-11-14 RX ADMIN — Medication 1 APPLICATION(S): at 05:07

## 2022-11-14 RX ADMIN — AMPICILLIN SODIUM AND SULBACTAM SODIUM 200 GRAM(S): 250; 125 INJECTION, POWDER, FOR SUSPENSION INTRAMUSCULAR; INTRAVENOUS at 17:04

## 2022-11-14 NOTE — PROGRESS NOTE ADULT - PROBLEM SELECTOR PLAN 3
Bun/Cr: 51/1.97  - this is around baseline  - monitor Cr  - avoid nephrotoxic agents

## 2022-11-14 NOTE — DISCHARGE NOTE PROVIDER - NSDCMRMEDTOKEN_GEN_ALL_CORE_FT
amLODIPine 5 mg oral tablet: 1 tab(s) orally once a day  finasteride 5 mg oral tablet: 1 tab(s) orally once a day   acetaminophen 325 mg oral tablet: 2 tab(s) orally every 6 hours, As needed, Temp greater or equal to 38C (100.4F), Mild Pain (1 - 3)  amLODIPine 5 mg oral tablet: 1 tab(s) orally once a day  amoxicillin-clavulanate 875 mg-125 mg oral tablet: 1 tab(s) orally 2 times a day   finasteride 5 mg oral tablet: 1 tab(s) orally once a day  nicotine 14 mg/24 hr transdermal film, extended release: 1 patch transdermal once a day

## 2022-11-14 NOTE — DISCHARGE NOTE PROVIDER - NSDCFUADDAPPT_GEN_ALL_CORE_FT
APPOINTMENT IN [facility] SCHEDULED FOR 11/21/22 AT 3:30 PM  LOCATION: DENTAL CLINIC 1ST FLOOR [place], [address] [place] [address]  PHONE: [phone]

## 2022-11-14 NOTE — PROGRESS NOTE ADULT - REASON FOR ADMISSION
Mechanical Fall w/Right Trimalar Fracture

## 2022-11-14 NOTE — DISCHARGE NOTE PROVIDER - NSDCCPCAREPLAN_GEN_ALL_CORE_FT
PRINCIPAL DISCHARGE DIAGNOSIS  Diagnosis: Orbital fracture  Assessment and Plan of Treatment: You had a mechanicall fall which led you to have multiple facial fracture. You underwent surgery with OMFS on Sunday 11/13. Please continue to use the antibiotic medication prescribed to you. Take pain medications as needed. Follow up with your primary care physician and OMFS clinic.      SECONDARY DISCHARGE DIAGNOSES  Diagnosis: Bladder cancer  Assessment and Plan of Treatment: Please continue your home medication finasteride. Follow up with your primary care physician.    Diagnosis: CKD (chronic kidney disease)  Assessment and Plan of Treatment: Continue blood pressure, cholesterol and diabetic medications. Goal of hemoglobin A1C (HgbA1C) < 7%.  Avoid nephrotoxic drugs such as nonsteroidal anti-inflammatory agents (NSAIDs).   Please follow up with your nephrologist to monitor your kidney function, continue with low protein and potassium diet.    Diagnosis: HTN (hypertension)  Assessment and Plan of Treatment: Low sodium and fat diet, continue anti-hypertensive medications, and follow up with primary care physician.

## 2022-11-14 NOTE — PROGRESS NOTE ADULT - SUBJECTIVE AND OBJECTIVE BOX
[facility] Division of Hospital Medicine  Libia Harvey MD  Pager (M-F, 8A-5P): [phone]  Other Times:  [phone]    Patient is a 72y old  Male who presents with a chief complaint of Mechanical Fall w/Right Trimalar Fracture (14 Nov 2022 07:46)    SUBJECTIVE / OVERNIGHT EVENTS:      MEDICATIONS  (STANDING):  amLODIPine   Tablet 5 milliGRAM(s) Oral daily  ampicillin/sulbactam  IVPB 3 Gram(s) IV Intermittent every 12 hours  erythromycin   Ointment 1 Application(s) Right EYE two times a day  finasteride 5 milliGRAM(s) Oral daily  influenza  Vaccine (HIGH DOSE) 0.7 milliLiter(s) IntraMuscular once  nicotine -  14 mG/24Hr(s) Patch 1 Patch Transdermal daily    MEDICATIONS  (PRN):  acetaminophen     Tablet .. 650 milliGRAM(s) Oral every 6 hours PRN Temp greater or equal to 38C (100.4F), Mild Pain (1 - 3)  oxyCODONE    IR 5 milliGRAM(s) Oral every 8 hours PRN Moderate Pain (4 - 6)      CAPILLARY BLOOD GLUCOSE        I&O's Summary    13 Nov 2022 07:01  -  14 Nov 2022 07:00  --------------------------------------------------------  IN: 0 mL / OUT: 1075 mL / NET: -1075 mL        PHYSICAL EXAM:  Vital Signs Last 24 Hrs  T(C): 36.4 (14 Nov 2022 09:14), Max: 36.9 (13 Nov 2022 12:00)  T(F): 97.5 (14 Nov 2022 09:14), Max: 98.4 (13 Nov 2022 12:00)  HR: 77 (14 Nov 2022 09:14) (73 - 101)  BP: 111/59 (14 Nov 2022 09:14) (95/61 - 120/64)  BP(mean): 58 (13 Nov 2022 13:00) (58 - 68)  RR: 18 (14 Nov 2022 09:14) (13 - 20)  SpO2: 100% (14 Nov 2022 09:14) (93% - 100%)    Parameters below as of 14 Nov 2022 09:14  Patient On (Oxygen Delivery Method): room air        CONSTITUTIONAL: NAD, well-developed, well-groomed  EYES: EOMI; conjunctiva and sclera clear  ENMT: Moist oral mucosa  RESPIRATORY: Normal respiratory effort; lungs are clear to auscultation bilaterally  CARDIOVASCULAR: Regular rate and rhythm, normal S1 and S2, no murmur; No lower extremity edema  ABDOMEN: Nontender to palpation, normoactive bowel sounds, no rebound/guarding  MUSCULOSKELETAL:   no clubbing or cyanosis of digits; no joint swelling or tenderness to palpation  PSYCH: A+O to person, place, and time; affect appropriate  NEUROLOGY: CN 2-12 are intact and symmetric; no gross sensory deficits   SKIN: No rashes; no palpable lesions    LABS:                        11.2   10.42 )-----------( 240      ( 14 Nov 2022 06:05 )             34.1     11-14    138  |  102  |  29<H>  ----------------------------<  94  4.0   |  24  |  1.99<H>    Ca    7.7<L>      14 Nov 2022 06:05  Phos  3.5     11-14  Mg     1.80     11-14      PT/INR - ( 13 Nov 2022 04:35 )   PT: 12.1 sec;   INR: 1.04 ratio         PTT - ( 13 Nov 2022 04:35 )  PTT:26.9 sec          RADIOLOGY & ADDITIONAL TESTS:  Results Reviewed:   Imaging Personally Reviewed:  Electrocardiogram Personally Reviewed:    COORDINATION OF CARE:  Care Discussed with Consultants/Other Providers [Y/N]: Y  Prior or Outpatient Records Reviewed [Y/N]: Y   [facility] Division of Hospital Medicine  Libia Harvey MD  Pager (M-F, 8A-5P): [phone]  Other Times:  [phone]    Patient is a 72y old  Male who presents with a chief complaint of Mechanical Fall w/Right Trimalar Fracture (14 Nov 2022 07:46)    SUBJECTIVE / OVERNIGHT EVENTS:  pt feels well - says pain in R eye has improved     MEDICATIONS  (STANDING):  amLODIPine   Tablet 5 milliGRAM(s) Oral daily  ampicillin/sulbactam  IVPB 3 Gram(s) IV Intermittent every 12 hours  erythromycin   Ointment 1 Application(s) Right EYE two times a day  finasteride 5 milliGRAM(s) Oral daily  influenza  Vaccine (HIGH DOSE) 0.7 milliLiter(s) IntraMuscular once  nicotine -  14 mG/24Hr(s) Patch 1 Patch Transdermal daily    MEDICATIONS  (PRN):  acetaminophen     Tablet .. 650 milliGRAM(s) Oral every 6 hours PRN Temp greater or equal to 38C (100.4F), Mild Pain (1 - 3)  oxyCODONE    IR 5 milliGRAM(s) Oral every 8 hours PRN Moderate Pain (4 - 6)      CAPILLARY BLOOD GLUCOSE        I&O's Summary    13 Nov 2022 07:01  -  14 Nov 2022 07:00  --------------------------------------------------------  IN: 0 mL / OUT: 1075 mL / NET: -1075 mL        PHYSICAL EXAM:  Vital Signs Last 24 Hrs  T(C): 36.4 (14 Nov 2022 09:14), Max: 36.9 (13 Nov 2022 12:00)  T(F): 97.5 (14 Nov 2022 09:14), Max: 98.4 (13 Nov 2022 12:00)  HR: 77 (14 Nov 2022 09:14) (73 - 101)  BP: 111/59 (14 Nov 2022 09:14) (95/61 - 120/64)  BP(mean): 58 (13 Nov 2022 13:00) (58 - 68)  RR: 18 (14 Nov 2022 09:14) (13 - 20)  SpO2: 100% (14 Nov 2022 09:14) (93% - 100%)    Parameters below as of 14 Nov 2022 09:14  Patient On (Oxygen Delivery Method): room air        CONSTITUTIONAL: NAD, well-developed, well-groomed  EYES: R eye with periorbital edema and ecchymosis;  sutures c/d/i  ENMT: Moist oral mucosa  RESPIRATORY: Normal respiratory effort; lungs are clear to auscultation bilaterally  CARDIOVASCULAR: Regular rate and rhythm, normal S1 and S2, no murmur; No lower extremity edema  ABDOMEN: Nontender to palpation, normoactive bowel sounds, no rebound/guarding  MUSCULOSKELETAL:   no clubbing or cyanosis of digits; no joint swelling or tenderness to palpation  PSYCH: A+O to person, place, and time; affect appropriate  NEUROLOGY: CN 2-12 are intact and symmetric; no gross sensory deficits   SKIN: No rashes; no palpable lesions    LABS:                        11.2   10.42 )-----------( 240      ( 14 Nov 2022 06:05 )             34.1     11-14    138  |  102  |  29<H>  ----------------------------<  94  4.0   |  24  |  1.99<H>    Ca    7.7<L>      14 Nov 2022 06:05  Phos  3.5     11-14  Mg     1.80     11-14      PT/INR - ( 13 Nov 2022 04:35 )   PT: 12.1 sec;   INR: 1.04 ratio         PTT - ( 13 Nov 2022 04:35 )  PTT:26.9 sec          RADIOLOGY & ADDITIONAL TESTS:  Results Reviewed:   Imaging Personally Reviewed:  Electrocardiogram Personally Reviewed:    COORDINATION OF CARE:  Care Discussed with Consultants/Other Providers [Y/N]: Y  Prior or Outpatient Records Reviewed [Y/N]: Y

## 2022-11-14 NOTE — PROGRESS NOTE ADULT - PROBLEM SELECTOR PLAN 5
bilateral intermittent pneumatic compression device

## 2022-11-14 NOTE — DISCHARGE NOTE NURSING/CASE MANAGEMENT/SOCIAL WORK - NSDCPNINST_GEN_ALL_CORE
Maintain incisions clean and dry, call MD with any signs of infection such as fever, redness or drainage from site.   Continue SOFT, "Bite-Size" Diet as instructed by MD.   Follow-up scheduled for [facility] location on  11/21/2022 at 3:30 PM,   at [facility] [place] [address], [address] [place] [address]

## 2022-11-14 NOTE — DISCHARGE NOTE PROVIDER - HOSPITAL COURSE
73M w/ PMHx of COPD, Anemia, BPH, HTN, R urothelial carcinoma s/p right nephroureterectomy, bladder CA, L ureteral stent for ureteral obstruction (goes every 6 mo for a stent exchange - last was 9/2022), CKD, was transferred from [facility] w/ multiple facial fx after mechanical fall    Orbital fracture.   - CT: Fractures of lateral wall of right orbit. fracture protrudes into the lateral aspect of the orbit and indents the lateral rectus, fracture of the lateral wall of the right orbit, fracture of the anterior wall of the right maxillary sinus as well as the lateral wall this is in combination with the depressed mid zygomatic arch fracture on the right and widening of the right frontal zygomatic sutures is consistent with the trimalar fracture.  - s/p OR 11/13 for surgical repair with OMFS   - Ophthalmology following  - on Unasyn IV per OMFS --> to be transitioned to Augmentin  - pain control.    HTN (hypertension).   - monitor bp, cont Amlodipine.    CKD (chronic kidney disease).   - Bun/Cr: 51/1.97  - this is around baseline  - monitor Cr  - avoid nephrotoxic agents.    Bladder cancer.   - cont finasteride.    On _____, case was discussed with ______, patient is medically cleared and optimized for discharge today. All medications were reviewed with attending, and sent to mutually agreed upon pharmacy. 73M w/ PMHx of COPD, Anemia, BPH, HTN, R urothelial carcinoma s/p right nephroureterectomy, bladder CA, L ureteral stent for ureteral obstruction (goes every 6 mo for a stent exchange - last was 9/2022), CKD, was transferred from [facility] w/ multiple facial fx after mechanical fall    Orbital fracture.   - CT: Fractures of lateral wall of right orbit. fracture protrudes into the lateral aspect of the orbit and indents the lateral rectus, fracture of the lateral wall of the right orbit, fracture of the anterior wall of the right maxillary sinus as well as the lateral wall this is in combination with the depressed mid zygomatic arch fracture on the right and widening of the right frontal zygomatic sutures is consistent with the trimalar fracture.  - s/p OR 11/13 for surgical repair with OMFS   - Ophthalmology following  - on Unasyn IV per OMFS --> to be transitioned to Augmentin 875 mg BID x 1 week  - pain control.    HTN (hypertension).   - monitor bp, cont Amlodipine.    CKD (chronic kidney disease).   - Bun/Cr: 51/1.97  - this is around baseline  - monitor Cr  - avoid nephrotoxic agents.    Bladder cancer.   - cont finasteride.    On 11/14/22, case was discussed with , patient is medically cleared and optimized for discharge today. All medications were reviewed with attending, and sent to mutually agreed upon pharmacy. 73M w/ PMHx of COPD, Anemia, BPH, HTN, R urothelial carcinoma s/p right nephroureterectomy, bladder CA, L ureteral stent for ureteral obstruction (goes every 6 mo for a stent exchange - last was 9/2022), CKD, was transferred from [facility] w/ multiple facial fx after mechanical fall    Orbital fracture.   - CT: Fractures of lateral wall of right orbit. fracture protrudes into the lateral aspect of the orbit and indents the lateral rectus, fracture of the lateral wall of the right orbit, fracture of the anterior wall of the right maxillary sinus as well as the lateral wall this is in combination with the depressed mid zygomatic arch fracture on the right and widening of the right frontal zygomatic sutures is consistent with the trimalar fracture.  - s/p OR 11/13 for surgical repair with OMFS   - Ophthalmology following  - on Unasyn IV per OMFS --> to be transitioned to Augmentin 875 mg BID x 1 week  - pain control.    HTN (hypertension).   - monitor bp, cont Amlodipine.    CKD (chronic kidney disease).   - Bun/Cr: 51/1.97  - this is around baseline  - monitor Cr  - avoid nephrotoxic agents.    Bladder cancer.   - cont finasteride.    On 11/15/22, case was discussed with , patient is medically cleared and optimized for discharge today. All medications were reviewed with attending, and sent to mutually agreed upon pharmacy. 73M w/ PMHx of COPD, Anemia, BPH, HTN, R urothelial carcinoma s/p right nephroureterectomy, bladder CA, L ureteral stent for ureteral obstruction (goes every 6 mo for a stent exchange - last was 9/2022), CKD, was transferred from [facility] w/ multiple facial fx after mechanical fall    Orbital fracture.   - CT: Fractures of lateral wall of right orbit. fracture protrudes into the lateral aspect of the orbit and indents the lateral rectus, fracture of the lateral wall of the right orbit, fracture of the anterior wall of the right maxillary sinus as well as the lateral wall this is in combination with the depressed mid zygomatic arch fracture on the right and widening of the right frontal zygomatic sutures is consistent with the trimalar fracture.  - s/p OR 11/13 for surgical repair with OMFS   - Ophthalmology following  - on Unasyn IV per OMFS --> to be transitioned to Augmentin 875 mg BID x 1 week  - pain control.    HTN (hypertension).   - monitor bp, cont Amlodipine.    CKD (chronic kidney disease).   - Bun/Cr: 51/1.97  - this is around baseline  - monitor Cr  - avoid nephrotoxic agents.    Bladder cancer.   - cont finasteride.    On 11/15/22, case was discussed with , patient is medically cleared and optimized for discharge today. All medications were reviewed with attending, and sent to mutually agreed upon pharmacy.

## 2022-11-14 NOTE — DISCHARGE NOTE NURSING/CASE MANAGEMENT/SOCIAL WORK - NSDCPEFALRISK_GEN_ALL_CORE
For information on Fall & Injury Prevention, visit: [email or web address] OR  [email or web address] OR  https://www.cdc.gov/steadi/patient.html

## 2022-11-14 NOTE — PROGRESS NOTE ADULT - ASSESSMENT
73 y/o Male, with a PmHx of COPD, Anemia, BPH, HTN, right urothelial carcinoma s/p right nephroureterectomy, bladder cancer, left ureteral stent for ureteral obstruction (goes every 6 months for a stent exchange - last was 9/2022), CKD, was transferred from [facility] with multiple facial fractures after mechanical fall.  Admitted to medicine for a right Lateral Wall Fracture/Trimalar Fracture.

## 2022-11-14 NOTE — DISCHARGE NOTE NURSING/CASE MANAGEMENT/SOCIAL WORK - NSDCFUADDAPPT_GEN_ALL_CORE_FT
[facility] SCHEDULED FOR 11/21/22 AT 3:30 PM  LOCATION: DENTAL CLINIC 1ST FLOOR [place], [address] [place] [address]  PHONE: [phone]

## 2022-11-14 NOTE — DISCHARGE NOTE PROVIDER - CARE PROVIDER_API CALL
Your primary care physician,   Phone: (   )    -  Fax: (   )    -  Follow Up Time: 1 week    [facility]   Phone: [phone]  Fax: (   )    -  Follow Up Time: 1 week   Your primary care physician,   Phone: (   )    -  Fax: (   )    -  Follow Up Time: 1 week    [facility],   Phone: [phone]  Fax: (   )    -  Scheduled Appointment: 11/21/2022 03:30 PM

## 2022-11-14 NOTE — DISCHARGE NOTE NURSING/CASE MANAGEMENT/SOCIAL WORK - NSDCPECAREGIVERED_GEN_ALL_CORE
Medline and carenotes for Zygoma fracture, Incision Care, Soft Diet following jaw/facial fracture, Pain meds, as well as DC Medications and side effects literature for patient reference.

## 2022-11-14 NOTE — PROGRESS NOTE ADULT - SUBJECTIVE AND OBJECTIVE BOX
HD4 POD1 Patient visited bedside on admitting floor. MALLIKA RAGLAND Patient is now 1 day s/p ORIF of facial fractures with Dr. Thompson. Patient endorses adequate PO intake. Patient reports that pain is well controlled.     HPI: 73yM with past medical history of COPD, anemia, HTN, right urothelial carcinoma s/p right nephroureterectomy, bladder cancer, left ureteral stent for ureteral obstruction, sent as a transfer from outside hospital with multiple facial fractures after mechanical fall. Patient states last night he tripped on his shoelace fell forward hitting the right side of his face on the curb. He believes he may have passed out for a few moments. Had a CT scan performed at outside hospital (CT head without evidence of ICH), CT face which shows a right orbit lateral wall fracture with fragments that protrude into the lateral aspect of the orbit and indents the lateral rectus muscle, findings also concerning for a trimalar fracture. Patient transferred for OMFS evaluation. Patient currently endorses soreness to the right side of the face as well as right shoulder pain. Patient denies dizziness changes to his vision numbness tingling weakness chest pain shortness of breath palpitations abdominal pain nausea vomiting difficulty with memory recent travel or illness or any other concerns.    Exam  H: Right periorbital edema and ecchymosis c/w surgical procedure, right lateral brow incision hemostatic, tissues approximated, sutures c/d/i  E: PERRL, EOMI b/l, no pain on upward gaze, visual acuity intact  E: Hearing intact  N: No epistaxis, nares patent  T: Trachea midline, no LAD  Maxillofacial: Intra-oral incision on right maxillary posterior vestibule hemostatic, tissues approximated, sutures c/d/i, ecchymosis of right buccal mucosa     ICU Vital Signs Last 24 Hrs  T(C): 36.6 (14 Nov 2022 05:15), Max: 36.9 (13 Nov 2022 12:00)  T(F): 97.8 (14 Nov 2022 05:15), Max: 98.4 (13 Nov 2022 12:00)  HR: 76 (14 Nov 2022 05:15) (73 - 101)  BP: 118/69 (14 Nov 2022 05:15) (95/61 - 160/69)  BP(mean): 58 (13 Nov 2022 13:00) (58 - 68)  ABP: --  ABP(mean): --  RR: 18 (14 Nov 2022 05:15) (13 - 20)  SpO2: 95% (14 Nov 2022 05:15) (93% - 100%)    O2 Parameters below as of 14 Nov 2022 05:15  Patient On (Oxygen Delivery Method): room air

## 2022-11-14 NOTE — PROGRESS NOTE ADULT - PROBLEM SELECTOR PLAN 1
CT: Fractures of lateral wall of right orbit. fracture protrudes into the lateral aspect of the orbit and indents the lateral rectus, fracture of the lateral wall of the right orbit, fracture of the anterior wall of the right maxillary sinus as well as the lateral wall this is in combination with the depressed mid zygomatic arch fracture on the right and widening of the right frontal zygomatic sutures is consistent with the trimalar fracture.  [ ] OR 11/13 for surgical repair with OMFS   - Ophthalmology following  - on Unasyn IV per OMFS   - pain control
CT: Fractures of lateral wall of right orbit. fracture protrudes into the lateral aspect of the orbit and indents the lateral rectus, fracture of the lateral wall of the right orbit, fracture of the anterior wall of the right maxillary sinus as well as the lateral wall this is in combination with the depressed mid zygomatic arch fracture on the right and widening of the right frontal zygomatic sutures is consistent with the trimalar fracture.  s/p  OR 11/13 for surgical repair with OMFS   - Ophthalmology following  - on Unasyn IV per OMFS - f/u with OMFS re: duration   - pain control - can try to transition to PO on 11/14 for dc planning
CT: Fractures of lateral wall of right orbit. fracture protrudes into the lateral aspect of the orbit and indents the lateral rectus, fracture of the lateral wall of the right orbit, fracture of the anterior wall of the right maxillary sinus as well as the lateral wall this is in combination with the depressed mid zygomatic arch fracture on the right and widening of the right frontal zygomatic sutures is consistent with the trimalar fracture.  s/p  OR 11/13 for surgical repair with OMFS   - Ophthalmology following  - on Unasyn IV per OMFS - transition to augmentin for d/c   - oral pain control

## 2022-11-14 NOTE — DISCHARGE NOTE PROVIDER - PROVIDER TOKENS
FREE:[LAST:[Your primary care physician],PHONE:[(   )    -],FAX:[(   )    -],FOLLOWUP:[1 week]],FREE:[LAST:[[facility]],PHONE:[phone]],FAX:[(   )    -],FOLLOWUP:[1 week]] FREE:[LAST:[Your primary care physician],PHONE:[(   )    -],FAX:[(   )    -],FOLLOWUP:[1 week]],FREE:[LAST:[[facility]],PHONE:[phone]],FAX:[(   )    -],SCHEDULEDAPPT:[11/21/2022],SCHEDULEDAPPTTIME:[03:30 PM]]

## 2022-11-14 NOTE — DISCHARGE NOTE NURSING/CASE MANAGEMENT/SOCIAL WORK - PATIENT PORTAL LINK FT
You can access the FollowMyHealth Patient Portal offered by [facility] by registering at the following website: [email or web address]. By joining [facility]’s FollowMyHealth portal, you will also be able to view your health information using other applications (apps) compatible with our system.

## 2022-11-14 NOTE — PROGRESS NOTE ADULT - ASSESSMENT
72 yr old male w/ hx of CKD, HTN, bladder cancer now 3 days s/p mechanical fall sustaining R. zygomaticomaxillary complex fractures now 1 day s/p ORIF of fractures on 11/13, healing well following normal post operative course.     Plan   continue unasyn, transition to augmentin outpatient   continue sinus precautions   Patient should follow up at outpatient [facility] clinic [phone]  Apt will be arranged for [facility]  [id number] 72 yr old male w/ hx of CKD, HTN, bladder cancer now 3 days s/p mechanical fall sustaining R. zygomaticomaxillary complex fractures now 1 day s/p ORIF of fractures on 11/13, healing well following normal post operative course.     Plan   continue unasyn, transition to augmentin outpatient   continue sinus precautions   Patient should follow up at outpatient [facility] [phone]  Apt will be arranged for FU    PATIENT HAS F/U APPOINTMENT SCHEDULED FOR 11/21/22 AT 3:30 PM  LOCATION: DENTAL CLINIC 1ST FLOOR [place]  PKONE: [phone]    [facility]  [id number]

## 2022-11-15 VITALS
OXYGEN SATURATION: 99 % | TEMPERATURE: 98 F | SYSTOLIC BLOOD PRESSURE: 117 MMHG | RESPIRATION RATE: 18 BRPM | DIASTOLIC BLOOD PRESSURE: 60 MMHG | HEART RATE: 80 BPM

## 2022-11-15 LAB
ANION GAP SERPL CALC-SCNC: 11 MMOL/L — SIGNIFICANT CHANGE UP (ref 7–14)
BUN SERPL-MCNC: 19 MG/DL — SIGNIFICANT CHANGE UP (ref 7–23)
CALCIUM SERPL-MCNC: 7.8 MG/DL — LOW (ref 8.4–10.5)
CHLORIDE SERPL-SCNC: 102 MMOL/L — SIGNIFICANT CHANGE UP (ref 98–107)
CO2 SERPL-SCNC: 26 MMOL/L — SIGNIFICANT CHANGE UP (ref 22–31)
CREAT SERPL-MCNC: 1.81 MG/DL — HIGH (ref 0.5–1.3)
EGFR: 39 ML/MIN/1.73M2 — LOW
GLUCOSE SERPL-MCNC: 107 MG/DL — HIGH (ref 70–99)
HCT VFR BLD CALC: 35.8 % — LOW (ref 39–50)
HGB BLD-MCNC: 11.6 G/DL — LOW (ref 13–17)
MAGNESIUM SERPL-MCNC: 1.7 MG/DL — SIGNIFICANT CHANGE UP (ref 1.6–2.6)
MCHC RBC-ENTMCNC: 29.7 PG — SIGNIFICANT CHANGE UP (ref 27–34)
MCHC RBC-ENTMCNC: 32.4 GM/DL — SIGNIFICANT CHANGE UP (ref 32–36)
MCV RBC AUTO: 91.6 FL — SIGNIFICANT CHANGE UP (ref 80–100)
NRBC # BLD: 0 /100 WBCS — SIGNIFICANT CHANGE UP (ref 0–0)
NRBC # FLD: 0 K/UL — SIGNIFICANT CHANGE UP (ref 0–0)
PHOSPHATE SERPL-MCNC: 2.9 MG/DL — SIGNIFICANT CHANGE UP (ref 2.5–4.5)
PLATELET # BLD AUTO: 244 K/UL — SIGNIFICANT CHANGE UP (ref 150–400)
POTASSIUM SERPL-MCNC: 4.3 MMOL/L — SIGNIFICANT CHANGE UP (ref 3.5–5.3)
POTASSIUM SERPL-SCNC: 4.3 MMOL/L — SIGNIFICANT CHANGE UP (ref 3.5–5.3)
RBC # BLD: 3.91 M/UL — LOW (ref 4.2–5.8)
RBC # FLD: 13.1 % — SIGNIFICANT CHANGE UP (ref 10.3–14.5)
SODIUM SERPL-SCNC: 139 MMOL/L — SIGNIFICANT CHANGE UP (ref 135–145)
WBC # BLD: 8.83 K/UL — SIGNIFICANT CHANGE UP (ref 3.8–10.5)
WBC # FLD AUTO: 8.83 K/UL — SIGNIFICANT CHANGE UP (ref 3.8–10.5)

## 2022-11-15 PROCEDURE — 99232 SBSQ HOSP IP/OBS MODERATE 35: CPT

## 2022-11-15 RX ADMIN — Medication 650 MILLIGRAM(S): at 06:52

## 2022-11-15 RX ADMIN — AMLODIPINE BESYLATE 5 MILLIGRAM(S): 2.5 TABLET ORAL at 05:09

## 2022-11-15 RX ADMIN — AMPICILLIN SODIUM AND SULBACTAM SODIUM 200 GRAM(S): 250; 125 INJECTION, POWDER, FOR SUSPENSION INTRAMUSCULAR; INTRAVENOUS at 05:08

## 2022-11-15 RX ADMIN — Medication 1 APPLICATION(S): at 05:09

## 2022-11-15 RX ADMIN — Medication 650 MILLIGRAM(S): at 05:54

## 2022-11-15 NOTE — CHART NOTE - NSCHARTNOTEFT_GEN_A_CORE
[facility] [place]'s diet on discharge. No callback received (Also paged by Dr. Harvey). Discussed with Dr. Harvey, discharge cancelled. Will clarify diet in AM. Son and patient notified. Son requesting to be contacted early in prior to discharge ([place] [phone]).
OMFS Post Op Check    Patient seen and evaluated bedside, resting comfortably in NAD. Denies pain. Voiding, tolerating PO intake, has not attempted ambulating yet. Denies blurry vision or diplopia.     Physical Exam:  H: Right periorbital edema and ecchymosis c/w surgical procedure, right lateral brow incision hemostatic, tissues approximated, sutures c/d/i  E: PERRL, EOMI b/l, no pain on upward gaze, visual acuity intact  E: Hearing intact  N: No epistaxis, nares patent  T: Trachea midline, no LAD  Maxillofacial: Intra-oral incision on right maxillary posterior vestibule hemostatic, tissues approximated, sutures c/d/i, ecchymosis of right buccal mucosa     Jonathan Kelley DDS  FS  [facility] #[id number]
OMFS event note:    PATIENT HAS F/U APPOINTMENT IN OMFS CLINIC SCHEDULED FOR 11/21/22 AT 3:30 PM  LOCATION: DENTAL CLINIC 1ST FLOOR [place], [address] [address] [place] [address]  PHONE: [phone]    Please include this information in patient's discharge paperwork
Paged by OMFS. OMFS saying that patient can be placed on soft diet until follow up.
pt seen and examined. vitals, labs, OMFS recs reviewed. pt is stable for dc home today. total time spent on dc 37min

## 2022-12-01 NOTE — ASU PATIENT PROFILE, ADULT - PREOP PAIN SCORE
Date of visit: 12/1/2022     The following Annual Medicare Wellness Exam is distinct and separate from the medical evaluation and decision making. This is a Subsequent Medicare Annual Wellness Visit (AWV), (Performed more than 12 months after effective date of Medicare Part B enrollment and 12 months after last preventive visit, Once in a lifetime)    I have reviewed the patient's medical history in detail and updated the computerized patient record. Clary Soulier is a 78 y.o. female   History obtained from: the patient. Individual medical history and medications as well as vital signs were reviewed today. The patient was advised to have an advanced care directive in place. Smoking status, fall risk assessment were reviewed with patient. Blood pressures have been slightly elevated at home since stopping both Cardura and olmesartan 20 mg. Sugars have been fasting 256D to 679D off Trulicity. Last A1c was 6.2. History     Patient Active Problem List   Diagnosis Code    Anxiety state, unspecified F41.1    Allergic rhinitis due to pollen J30.1    Depression F32. 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    Essential hypertension I10    Type 2 diabetes mellitus with stage 3 chronic kidney disease, with long-term current use of insulin ([facility]) E11.22, N18.30, Z79.4    Atrial fibrillation ([facility]) I48.91    Other specified anemias D64.89    Dementia ([facility]) F03.90    Hyperlipidemia E78.5    Neuropathy G62.9    Sleep apnea G47.30    Glaucoma H40.9    Chronic obstructive pulmonary disease ([facility]) J44.9     Past Medical History:   Diagnosis Date    Allergic rhinitis due to pollen     Anxiety state, unspecified     Backache, unspecified     Basal cell carcinoma     eyelid    Chronic maxillary sinusitis     Depression     Diabetes ([facility] [address]     Diabetes mellitus without mention of complication Dizziness and giddiness     Unspecified disorder of ankle and foot joint     Unspecified essential hypertension     Urinary tract infection, site not specified       Past Surgical History:   Procedure Laterality Date    HX GYN      HX PACEMAKER N/A     NC BREAST SURGERY PROCEDURE UNLISTED       Allergies   Allergen Reactions    Amoxicillin Hives, Itching and Nausea and Vomiting    Other Medication Nausea Only     Tylenol with codeine and flexaril    Prednisone Itching and Nausea and Vomiting     Current Outpatient Medications   Medication Sig Dispense Refill    pneumococcal 20-fabian conj-dip, PF, (PREVNAR 20) 0.5 mL syrg injection 0.5 mL by IntraMUSCular route once for 1 dose. 1 Each 0    olmesartan (BENICAR) 5 mg tablet Take 1 Tablet by mouth daily. 30 Tablet 5    albuterol-ipratropium (DUO-NEB) 2.5 mg-0.5 mg/3 ml nebu 3 mL by Nebulization route every six (6) hours as needed for Wheezing. 1 Each 5    gabapentin (NEURONTIN) 100 mg capsule Take 200 mg by mouth Every morning. 200 in AM      gabapentin (NEURONTIN) 300 mg capsule Take 300 mg by mouth nightly. melatonin 5 mg cap capsule Take 5 mg by mouth nightly. 2 tabs      metoprolol tartrate (LOPRESSOR) 50 mg tablet Take  by mouth two (2) times a day. 1 1/2 tab bid      nystatin-triamcinolone (MYCOLOG II) topical cream Apply  to affected area two (2) times a day. nystatin (MYCOSTATIN) powder Apply  to affected area four (4) times daily. sertraline (ZOLOFT) 100 mg tablet Take  by mouth daily. simvastatin (ZOCOR) 20 mg tablet Take  by mouth nightly. cholecalciferol (VITAMIN D3) 25 mcg (1,000 unit) cap Take  by mouth daily. ezetimibe (ZETIA) 10 mg tablet Take  by mouth. donepeziL (ARICEPT) 5 mg tablet Take  by mouth nightly. apixaban (ELIQUIS) 5 mg tablet Take 5 mg by mouth two (2) times a day. docusate sodium (COLACE) 100 mg capsule Take 100 mg by mouth two (2) times a day.       omeprazole (PRILOSEC) 20 mg capsule Take 2 Capsules by mouth daily. (Patient taking differently: Take 20 mg by mouth daily.) 30 Capsule 2    tiotropium (SPIRIVA) 18 mcg inhalation capsule Take 1 Capsule by inhalation daily. 30 Capsule 5    Bifidobacterium Infantis 4 mg cap Take  by mouth. Insulin Needles, Disposable, (PEN NEEDLE) 31 gauge x 5/16\" ndle E11.22 Diabetes type 2 with kidney complications Inject daily 1 Package 0     Family History   Problem Relation Age of Onset    Other Mother         mental illness    Hypertension Mother     Diabetes Father     Hypertension Father     Cancer Sister         breast    Diabetes Sister     Diabetes Brother     Cancer Son         lung     Social History     Tobacco Use    Smoking status: Former     Packs/day: 0.50     Types: Cigarettes     Quit date: 1975     Years since quittin.9    Smokeless tobacco: Never    Tobacco comments:     quit over twenty years ago. Substance Use Topics    Alcohol use: No     Comment: wine at times       Specialists/Care Team   Juan Carlos Bedolla has established care with the following healthcare providers:  Patient Care Team:  Ranjeet Call MD as PCP - General (Family Medicine)  Ranjeet Call MD as PCP - [address] [place] [address] [facility]  Savannah Zhao RN as Ambulatory Care Manager (Family Medicine)  Sole Sweeney MD (Endocrinology Physician)    Health Risk Assessment     Demographics   female  78 y.o. General Health Questions   -During the past 4 weeks:   -how would you rate your health in general? Fair   -Have you noticed any hearing difficulties? no    Emotional Health Questions   -Do you have a history of depression, anxiety, or emotional problems? yes  -Over the past 2 weeks, have you felt down, depressed or hopeless? yes  -Over the past 2 weeks, have you felt little interest or pleasure in doing things? no    Health Habits   Do you get 5 servings of fruits or vegetables daily? yes  Do you exercise regularly?  no    Activities of Daily Living and Functional Status   -Do you need help with eating, walking, dressing, bathing, toileting, the phone, transportation, shopping, preparing meals, housework, laundry, medications or managing money? yes  -Are you confident are you that you can control and manage most of your health problems? yes    Fall Risk and Home Safety   Have you fallen 2 or more times in the past year? Yes, getting PTdmen        Physical Examination     Vitals:    12/01/22 0955 12/01/22 1040   BP: (!) 153/73 (!) 147/68   Pulse: 70    Resp: 14    Temp: 97.4 °F (36.3 °C)    TempSrc: Oral    SpO2: 97%    Weight: 156 lb (70.8 kg)    Height: 5' (1.524 m)      Body mass index is 30.47 kg/m². No results found. Evaluation of Cognitive Function   Mood/affect:  happy  Appearance: age appropriate and casually dressed  Neuro appt 12/29/22        Preventive Services   Reviewed with patient if applicable:  Pneumococcal vaccines   Flu vaccine   Hepatitis B vaccine (if at risk)   Shingles vaccine   TDAP vaccine   COV-19 vaccine                                  Discussion of Advance Directive   She has ACP docs. Assessment/Plan       ICD-10-CM ICD-9-CM    1. Encounter for general adult medical examination with abnormal findings  Z00.01 V70.0       2. Encounter for immunization  Z23 V03.89 pneumococcal 20-fabian conj-dip, PF, (PREVNAR 20) 0.5 mL syrg injection      3. Benign hypertension  I10 401.1 olmesartan (BENICAR) 5 mg tablet          Orders Placed This Encounter    pneumococcal 20-fabian conj-dip, PF, (PREVNAR 20) 0.5 mL syrg injection    olmesartan (BENICAR) 5 mg tablet   Start back on olmesartan but at lower dose of 5 mg. Monitor blood pressure. Advised to get  Prevnar 20 immunization at local pharmacy. She has eye exam scheduled next month. I have discussed the diagnosis with the patient and the intended plan as seen in the above orders. Social history, medical history, and labs were reviewed.   The patient has received an after-visit summary and questions were answered concerning future plans. I have discussed medication side effects and warnings with the patient as well. Patient/guardian verbalized understanding and accepts plan & risks. MD JOVON Marinelli & JENNY SPRINGER [facility]  12/01/22  Follow-up and Dispositions    Return in about 7 weeks (around 1/18/2023) for diabetes. 0

## 2023-01-18 NOTE — H&P PST ADULT - BP NONINVASIVE MEAN (MM HG)
Caller: Leonard Crandall    Relationship: Self    Best call back number: [phone]    Requested Prescriptions:   Requested Prescriptions     Pending Prescriptions Disp Refills   • lisinopril (PRINIVIL,ZESTRIL) 10 MG tablet 90 tablet 0     Sig: Take 1 tablet by mouth Daily.   • methocarbamol (Robaxin) 500 MG tablet 30 tablet 1     Sig: Take 1 tablet by mouth 3 (Three) Times a Day As Needed for Muscle Spasms.        Pharmacy where request should be sent: [facility] [place], [place] - [id number] - [phone]  - [phone] FX     Additional details provided by patient: PATIENT HAS ONE DAY LEFT.    Does the patient have less than a 3 day supply:  [x] Yes  [] No    Would you like a call back once the refill request has been completed: [x] Yes [] No    If the office needs to give you a call back, can they leave a voicemail: [x] Yes [] No    Roberto Stoner Rep   01/18/23 10:33 EST            83

## 2023-02-26 DIAGNOSIS — Z85.51 PERSONAL HISTORY OF MALIGNANT NEOPLASM OF BLADDER: ICD-10-CM

## 2023-02-27 ENCOUNTER — APPOINTMENT (OUTPATIENT)
Dept: UROLOGY | Facility: CLINIC | Age: 73
End: 2023-02-27
Payer: MEDICARE

## 2023-02-27 VITALS — SYSTOLIC BLOOD PRESSURE: 181 MMHG | DIASTOLIC BLOOD PRESSURE: 71 MMHG | HEART RATE: 83 BPM

## 2023-02-27 DIAGNOSIS — Z00.00 ENCOUNTER FOR GENERAL ADULT MEDICAL EXAMINATION W/OUT ABNORMAL FINDINGS: ICD-10-CM

## 2023-02-27 PROCEDURE — 88112 CYTOPATH CELL ENHANCE TECH: CPT | Mod: 26

## 2023-02-27 PROCEDURE — 99214 OFFICE O/P EST MOD 30 MIN: CPT

## 2023-02-28 NOTE — LETTER BODY
[FreeTextEntry1] : Kalin OBREGON Leventhal DO\par [address]\[place] [address]\par [phone]\par \par Dear Dr. Leventhal,\par \par Reason for visit: Bladder cancer s/p TURBT. Left ureteral obstruction managed with chronic stent exchanges.. \par \par This is a 73 year old gentleman with previous right urothelial carcinoma s/p right nephroureterectomy and recurrent bladder cancer s/p TURBT presenting with left ureteral obstruction. The patient previously experienced a diffuse pruritic rash on arms and legs with Mitomycin bladder instillation. His previous cystoscopy was unremarkable. Since he was last seen, the patient reports doing well. He has no urinary complaints or difficulties. The patient is overall well. He denies any changes in health. The past medical history and family history and social history are unchanged. All other review of systems are negative. Patient denies any changes in medications. Medication list was reconciled.\par \par On examination, the patient is an older-appearing gentleman in no acute distress. He is alert and oriented follows commands. He has normal mood and affect. He is normocephalic. Neck is supple. Oral no thrush Respirations are unlabored. His abdomen is soft and nontender. Bladder is nonpalpable. No CVA tenderness. Neurologically he is grossly intact. No peripheral edema. Skin without gross abnormality. He has normal male external genitalia. Normal meatus. Bilateral testes are descended intrascrotally and normal to palpation.  Patient has an inguinal hernia.\par \par Assessment: Bladder cancer, s/p TURBT. Left ureteral obstruction. Inguinal hernia\par \par I counseled the patient. Patient's last stent exchange was 6 months ago. Patient will proceed with left stent exchange for his left ureteral obstruction. I counseled the patient regarding the procedure. The risks and benefits were discussed. Alternatives were given. I answered the patient questions. The patient will take the necessary preparations for the procedure, including activated partial thromboplastin time, BMP, CBC w/ DIFF, culture, prothrombin Time and INR. In terms of his hernia, I encouraged him to see Dr. Wilfred Vaughan for hernia repair. Risks and alternatives were discussed. I answered the patient questions. The patient will follow-up as directed and will contact me with any questions or concerns. Thank you for the opportunity to participate in the care of Mr. AHMADI. I will keep you updated on his progress.\par \par Plan: Left stent exchange. Preop labs. See Dr. Wilfred Vaughan for hernia repair. Follow up as directed.

## 2023-02-28 NOTE — ADDENDUM
[FreeTextEntry1] : Entered by Kristal Day, acting as scribe for Dr. Paul Smith.\par The documentation recorded by the scribe accurately reflects the service I personally performed and the decisions made by me.

## 2023-03-03 LAB
ANION GAP SERPL CALC-SCNC: 14 MMOL/L
BACTERIA UR CULT: NORMAL
BASOPHILS # BLD AUTO: 0.06 K/UL
BASOPHILS NFR BLD AUTO: 0.7 %
BUN SERPL-MCNC: 31 MG/DL
CALCIUM SERPL-MCNC: 8.2 MG/DL
CHLORIDE SERPL-SCNC: 100 MMOL/L
CO2 SERPL-SCNC: 24 MMOL/L
CREAT SERPL-MCNC: 1.92 MG/DL
EGFR: 36 ML/MIN/1.73M2
EOSINOPHIL # BLD AUTO: 0.22 K/UL
EOSINOPHIL NFR BLD AUTO: 2.6 %
GLUCOSE SERPL-MCNC: 129 MG/DL
HCT VFR BLD CALC: 41.3 %
HGB BLD-MCNC: 12.9 G/DL
IMM GRANULOCYTES NFR BLD AUTO: 0.2 %
LYMPHOCYTES # BLD AUTO: 1.77 K/UL
LYMPHOCYTES NFR BLD AUTO: 20.6 %
MAN DIFF?: NORMAL
MCHC RBC-ENTMCNC: 28.7 PG
MCHC RBC-ENTMCNC: 31.2 GM/DL
MCV RBC AUTO: 92 FL
MONOCYTES # BLD AUTO: 0.77 K/UL
MONOCYTES NFR BLD AUTO: 9 %
NEUTROPHILS # BLD AUTO: 5.74 K/UL
NEUTROPHILS NFR BLD AUTO: 66.9 %
PLATELET # BLD AUTO: 292 K/UL
POTASSIUM SERPL-SCNC: 4.6 MMOL/L
RBC # BLD: 4.49 M/UL
RBC # FLD: 13.2 %
SODIUM SERPL-SCNC: 137 MMOL/L
URINE CYTOLOGY: NORMAL
WBC # FLD AUTO: 8.58 K/UL

## 2023-03-07 RX ORDER — CEPHALEXIN 500 MG/1
500 TABLET ORAL 3 TIMES DAILY
Qty: 15 | Refills: 0 | Status: COMPLETED | COMMUNITY
Start: 2021-01-07 | End: 2023-03-07

## 2023-03-07 RX ORDER — CEPHALEXIN 500 MG/1
500 TABLET ORAL
Qty: 15 | Refills: 0 | Status: COMPLETED | COMMUNITY
Start: 2022-09-16 | End: 2023-03-07

## 2023-03-07 RX ORDER — CEPHALEXIN 500 MG/1
500 TABLET ORAL 3 TIMES DAILY
Qty: 15 | Refills: 0 | Status: COMPLETED | COMMUNITY
Start: 2021-07-04 | End: 2023-03-07

## 2023-03-07 RX ORDER — CEPHALEXIN 500 MG/1
500 TABLET ORAL 3 TIMES DAILY
Qty: 15 | Refills: 0 | Status: COMPLETED | COMMUNITY
Start: 2022-01-24 | End: 2023-03-07

## 2023-03-10 PROBLEM — N40.0 BENIGN PROSTATIC HYPERPLASIA WITHOUT LOWER URINARY TRACT SYMPTOMS: Chronic | Status: ACTIVE | Noted: 2019-10-17

## 2023-03-28 ENCOUNTER — APPOINTMENT (OUTPATIENT)
Dept: SURGERY | Facility: CLINIC | Age: 73
End: 2023-03-28
Payer: MEDICARE

## 2023-03-28 VITALS
OXYGEN SATURATION: 95 % | HEART RATE: 92 BPM | WEIGHT: 132 LBS | TEMPERATURE: 98 F | BODY MASS INDEX: 22.53 KG/M2 | SYSTOLIC BLOOD PRESSURE: 190 MMHG | HEIGHT: 64 IN | DIASTOLIC BLOOD PRESSURE: 78 MMHG

## 2023-03-28 PROCEDURE — 99406 BEHAV CHNG SMOKING 3-10 MIN: CPT

## 2023-03-28 PROCEDURE — 99204 OFFICE O/P NEW MOD 45 MIN: CPT

## 2023-03-28 NOTE — HISTORY OF PRESENT ILLNESS
[de-identified] : Patient referred by Dr. Smith\par \par Patient is a 73 year old male with PMHx of bladder CA, s/p TURBT, right urothelial CA s/p right nephroureterectomy, who presents with right scrotal inguinal hernia present for 15 years, symptomatic for the past 1 year. As per patient he has decreased his activity due to the hernia, and at times feels discomfort, particularly if wearing tighter clothing. Patient also describes that the hernia incompletely reduces when lying down. Denies N/V, no fever/chills, normal bowel function. Smokes 1 pack/day, no EtOH/drug use. NKDA.

## 2023-03-28 NOTE — PLAN
[FreeTextEntry1] : \par - I spoke with the patient regarding the findings, which at this time are consistent with symptomatic reducible bilateral inguinal hernia with scrotal component\par - As a result I offered the patient robotic-assisted laparoscopic bilateral inguinal hernia repair with mesh, possible open, and we discussed the indications, risks, benefits, and alternatives of the procedure, including the use of mesh, to which the patient stated that he understood, gave consent, and all his questions were answered\par - I also spent 5 minutes counselling the patient on the importance of smoking cessation to improve perioperative and postoperative recovery and for health benefits\par - Surgical planning\par - Patient to follow with PCP for medical risk stratification\par - Patient advised that in the interim, should he develop signs/symptoms concerning for incarceration/obstruction, to immediately go to the ED, to which he stated he understood and was agreeable to

## 2023-03-28 NOTE — PHYSICAL EXAM
[Respiratory Effort] : normal respiratory effort [Normal Rate and Rhythm] : normal rate and rhythm [Alert] : alert [Oriented to Person] : oriented to person [Oriented to Place] : oriented to place [Oriented to Time] : oriented to time [Calm] : calm [de-identified] : NAD [de-identified] : Soft, non-distended, non-TTP in all quadrants, well-healed right abdominal/flank incisions, no rebound/guarding [de-identified] : Testes in normal anatomic position, large reducible right scrotal inguinal hernia, reducible left inguinal hernia [de-identified] : No skin changes to abdominal wall/inguinal region

## 2023-03-28 NOTE — ASSESSMENT
[FreeTextEntry1] : 73 year old male with symptomatic b/l inguinal hernias with large right scrotal component

## 2023-04-07 ENCOUNTER — OUTPATIENT (OUTPATIENT)
Dept: OUTPATIENT SERVICES | Facility: HOSPITAL | Age: 73
LOS: 1 days | End: 2023-04-07
Payer: MEDICARE

## 2023-04-07 VITALS
HEIGHT: 63 IN | HEART RATE: 81 BPM | SYSTOLIC BLOOD PRESSURE: 145 MMHG | WEIGHT: 134.92 LBS | RESPIRATION RATE: 14 BRPM | OXYGEN SATURATION: 98 % | DIASTOLIC BLOOD PRESSURE: 71 MMHG | TEMPERATURE: 98 F

## 2023-04-07 DIAGNOSIS — Z98.890 OTHER SPECIFIED POSTPROCEDURAL STATES: Chronic | ICD-10-CM

## 2023-04-07 DIAGNOSIS — N13.30 UNSPECIFIED HYDRONEPHROSIS: ICD-10-CM

## 2023-04-07 DIAGNOSIS — Z90.5 ACQUIRED ABSENCE OF KIDNEY: Chronic | ICD-10-CM

## 2023-04-07 DIAGNOSIS — Z00.00 ENCOUNTER FOR GENERAL ADULT MEDICAL EXAMINATION WITHOUT ABNORMAL FINDINGS: ICD-10-CM

## 2023-04-07 DIAGNOSIS — C67.9 MALIGNANT NEOPLASM OF BLADDER, UNSPECIFIED: ICD-10-CM

## 2023-04-07 DIAGNOSIS — Z98.41 CATARACT EXTRACTION STATUS, RIGHT EYE: Chronic | ICD-10-CM

## 2023-04-07 DIAGNOSIS — Z01.818 ENCOUNTER FOR OTHER PREPROCEDURAL EXAMINATION: ICD-10-CM

## 2023-04-07 DIAGNOSIS — N40.1 BENIGN PROSTATIC HYPERPLASIA WITH LOWER URINARY TRACT SYMPTOMS: ICD-10-CM

## 2023-04-07 DIAGNOSIS — Z96.0 PRESENCE OF UROGENITAL IMPLANTS: Chronic | ICD-10-CM

## 2023-04-07 DIAGNOSIS — Z00.01 ENCOUNTER FOR GENERAL ADULT MEDICAL EXAMINATION WITH ABNORMAL FINDINGS: ICD-10-CM

## 2023-04-07 LAB
ANION GAP SERPL CALC-SCNC: 12 MMOL/L — SIGNIFICANT CHANGE UP (ref 5–17)
BUN SERPL-MCNC: 33 MG/DL — HIGH (ref 7–23)
CALCIUM SERPL-MCNC: 8.1 MG/DL — LOW (ref 8.4–10.5)
CHLORIDE SERPL-SCNC: 103 MMOL/L — SIGNIFICANT CHANGE UP (ref 96–108)
CO2 SERPL-SCNC: 24 MMOL/L — SIGNIFICANT CHANGE UP (ref 22–31)
CREAT SERPL-MCNC: 2.15 MG/DL — HIGH (ref 0.5–1.3)
EGFR: 31 ML/MIN/1.73M2 — LOW
GLUCOSE SERPL-MCNC: 165 MG/DL — HIGH (ref 70–99)
HCT VFR BLD CALC: 43.9 % — SIGNIFICANT CHANGE UP (ref 39–50)
HGB BLD-MCNC: 13.4 G/DL — SIGNIFICANT CHANGE UP (ref 13–17)
MCHC RBC-ENTMCNC: 28.2 PG — SIGNIFICANT CHANGE UP (ref 27–34)
MCHC RBC-ENTMCNC: 30.5 GM/DL — LOW (ref 32–36)
MCV RBC AUTO: 92.4 FL — SIGNIFICANT CHANGE UP (ref 80–100)
NRBC # BLD: 0 /100 WBCS — SIGNIFICANT CHANGE UP (ref 0–0)
PLATELET # BLD AUTO: 299 K/UL — SIGNIFICANT CHANGE UP (ref 150–400)
POTASSIUM SERPL-MCNC: 4.6 MMOL/L — SIGNIFICANT CHANGE UP (ref 3.5–5.3)
POTASSIUM SERPL-SCNC: 4.6 MMOL/L — SIGNIFICANT CHANGE UP (ref 3.5–5.3)
RBC # BLD: 4.75 M/UL — SIGNIFICANT CHANGE UP (ref 4.2–5.8)
RBC # FLD: 13.2 % — SIGNIFICANT CHANGE UP (ref 10.3–14.5)
SODIUM SERPL-SCNC: 139 MMOL/L — SIGNIFICANT CHANGE UP (ref 135–145)
WBC # BLD: 8.52 K/UL — SIGNIFICANT CHANGE UP (ref 3.8–10.5)
WBC # FLD AUTO: 8.52 K/UL — SIGNIFICANT CHANGE UP (ref 3.8–10.5)

## 2023-04-07 PROCEDURE — G0463: CPT

## 2023-04-07 PROCEDURE — 85027 COMPLETE CBC AUTOMATED: CPT

## 2023-04-07 PROCEDURE — 87086 URINE CULTURE/COLONY COUNT: CPT

## 2023-04-07 PROCEDURE — 36415 COLL VENOUS BLD VENIPUNCTURE: CPT

## 2023-04-07 PROCEDURE — 80048 BASIC METABOLIC PNL TOTAL CA: CPT

## 2023-04-07 RX ORDER — SODIUM CHLORIDE 9 MG/ML
3 INJECTION INTRAMUSCULAR; INTRAVENOUS; SUBCUTANEOUS EVERY 8 HOURS
Refills: 0 | Status: DISCONTINUED | OUTPATIENT
Start: 2023-04-19 | End: 2023-05-03

## 2023-04-07 RX ORDER — SODIUM CHLORIDE 9 MG/ML
1000 INJECTION, SOLUTION INTRAVENOUS
Refills: 0 | Status: DISCONTINUED | OUTPATIENT
Start: 2023-04-19 | End: 2023-05-03

## 2023-04-07 RX ORDER — LIDOCAINE HCL 20 MG/ML
0.2 VIAL (ML) INJECTION ONCE
Refills: 0 | Status: DISCONTINUED | OUTPATIENT
Start: 2023-04-19 | End: 2023-05-03

## 2023-04-07 NOTE — H&P PST ADULT - HISTORY OF PRESENT ILLNESS
74 YO M PMH HTN, current smoker ("mild emphysema" detected on CT 2020 & 2023), right urothelial carcinoma s/p right nephroureterectomy, bladder cancer, left ureteral stent change for ureteral obstruction every ~6 months (last stent change 3/2022), presents to PST for LEFT STENT EXCHANGE 4/19/2023. Denies any palpitations, SOB, N/V, Covid symptoms (fever, chills, cough) or exposure.      ** Pt reports 7 day course of antibiotics was prescribed by PCP & completed for +Ucx. Ucx repeated on 9/8/2022 by Dr. Smith, + for coag negative Staphylococcus, not treated per pt. Ucx repeated in PST 9/14/2022.    ***Denies COVID infection in past 90 days       72 YO M PMH HTN, current smoker ("mild emphysema" detected on CT 2020 & 2023), right urothelial carcinoma s/p right nephroureterectomy, bladder cancer, left ureteral stent change for ureteral obstruction every ~6 months (last stent change 3/2022), presents to PST for LEFT STENT EXCHANGE 4/19/2023. Denies any palpitations, SOB, N/V, Covid symptoms (fever, chills, cough) or exposure.      ***Denies COVID infection in past 90 days

## 2023-04-07 NOTE — H&P PST ADULT - ASSESSMENT
Denies loose teeth or implants; advised to either leave dentures home or bring cup  Malpati score- 2 with phonation

## 2023-04-07 NOTE — H&P PST ADULT - MUSCULOSKELETAL
details… normal/ROM intact/no joint swelling/no joint erythema/no joint warmth/normal gait/strength 5/5 bilateral upper extremities/strength 5/5 bilateral lower extremities

## 2023-04-07 NOTE — H&P PST ADULT - NSICDXFAMILYHX_GEN_ALL_CORE_FT
FAMILY HISTORY:  FH: lung cancer, Mother  FH: type 2 diabetes, Father     FAMILY HISTORY:  FH: lung cancer, Mother  FH: type 2 diabetes, Father  FHx: heart disease, CABG - brother

## 2023-04-07 NOTE — H&P PST ADULT - NSCAFFEINETYPE_GEN_ALL_CORE_SD
Problem: Potential for Falls  Goal: Patient will remain free of falls  Description: INTERVENTIONS:  - Educate patient/family on patient safety including physical limitations  - Instruct patient to call for assistance with activity   - Consult OT/PT to assist with strengthening/mobility   - Keep Call bell within reach  - Keep bed low and locked with side rails adjusted as appropriate  - Keep care items and personal belongings within reach  - Initiate and maintain comfort rounds  - Make Fall Risk Sign visible to staff  - Offer Toileting every 2 Hours, in advance of need  - Initiate/Maintain alarm  - Obtain necessary fall risk management equipment:   - Apply yellow socks and bracelet for high fall risk patients  - Consider moving patient to room near nurses station  Outcome: Progressing     Problem: MOBILITY - ADULT  Goal: Maintain or return to baseline ADL function  Description: INTERVENTIONS:  -  Assess patient's ability to carry out ADLs; assess patient's baseline for ADL function and identify physical deficits which impact ability to perform ADLs (bathing, care of mouth/teeth, toileting, grooming, dressing, etc )  - Assess/evaluate cause of self-care deficits   - Assess range of motion  - Assess patient's mobility; develop plan if impaired  - Assess patient's need for assistive devices and provide as appropriate  - Encourage maximum independence but intervene and supervise when necessary  - Involve family in performance of ADLs  - Assess for home care needs following discharge   - Consider OT consult to assist with ADL evaluation and planning for discharge  - Provide patient education as appropriate  Outcome: Progressing  Goal: Maintains/Returns to pre admission functional level  Description: INTERVENTIONS:  - Perform BMAT or MOVE assessment daily    - Set and communicate daily mobility goal to care team and patient/family/caregiver     - Collaborate with rehabilitation services on mobility goals if consulted  - Perform Range of Motion 4 times a day  - Reposition patient every 2 hours    - Dangle patient 4 times a day  - Stand patient 4 times a day  - Ambulate patient 4 times a day  - Out of bed to chair 3 times a day   - Out of bed for meals 3 times a day  - Out of bed for toileting  - Record patient progress and toleration of activity level   Outcome: Progressing     Problem: Prexisting or High Potential for Compromised Skin Integrity  Goal: Skin integrity is maintained or improved  Description: INTERVENTIONS:  - Identify patients at risk for skin breakdown  - Assess and monitor skin integrity  - Assess and monitor nutrition and hydration status  - Monitor labs   - Assess for incontinence   - Turn and reposition patient  - Assist with mobility/ambulation  - Relieve pressure over bony prominences  - Avoid friction and shearing  - Provide appropriate hygiene as needed including keeping skin clean and dry  - Evaluate need for skin moisturizer/barrier cream  - Collaborate with interdisciplinary team   - Patient/family teaching  - Consider wound care consult   Outcome: Progressing     Problem: GENITOURINARY - ADULT  Goal: Maintains or returns to baseline urinary function  Description: INTERVENTIONS:  - Assess urinary function  - Encourage oral fluids to ensure adequate hydration if ordered  - Administer IV fluids as ordered to ensure adequate hydration  - Administer ordered medications as needed  - Offer frequent toileting  - Follow urinary retention protocol if ordered  Outcome: Progressing  Goal: Absence of urinary retention  Description: INTERVENTIONS:  - Assess patient’s ability to void and empty bladder  - Monitor I/O  - Bladder scan as needed  - Discuss with physician/AP medications to alleviate retention as needed  - Discuss catheterization for long term situations as appropriate  Outcome: Progressing  Goal: Urinary catheter remains patent  Description: INTERVENTIONS:  - Assess patency of urinary catheter  - If patient has a chronic suarez, consider changing catheter if non-functioning  - Follow guidelines for intermittent irrigation of non-functioning urinary catheter  Outcome: Progressing     Problem: METABOLIC, FLUID AND ELECTROLYTES - ADULT  Goal: Electrolytes maintained within normal limits  Description: INTERVENTIONS:  - Monitor labs and assess patient for signs and symptoms of electrolyte imbalances  - Administer electrolyte replacement as ordered  - Monitor response to electrolyte replacements, including repeat lab results as appropriate  - Instruct patient on fluid and nutrition as appropriate  Outcome: Progressing  Goal: Fluid balance maintained  Description: INTERVENTIONS:  - Monitor labs   - Monitor I/O and WT  - Instruct patient on fluid and nutrition as appropriate  - Assess for signs & symptoms of volume excess or deficit  Outcome: Progressing  Goal: Glucose maintained within target range  Description: INTERVENTIONS:  - Monitor Blood Glucose as ordered  - Assess for signs and symptoms of hyperglycemia and hypoglycemia  - Administer ordered medications to maintain glucose within target range  - Assess nutritional intake and initiate nutrition service referral as needed  Outcome: Progressing     Problem: SKIN/TISSUE INTEGRITY - ADULT  Goal: Skin Integrity remains intact(Skin Breakdown Prevention)  Description: Assess:  -Perform Osito assessment every   -Clean and moisturize skin every   -Inspect skin when repositioning, toileting, and assisting with ADLS  -Assess under medical devices such as  every   -Assess extremities for adequate circulation and sensation     Bed Management:  -Have minimal linens on bed & keep smooth, unwrinkled  -Change linens as needed when moist or perspiring  -Avoid sitting or lying in one position for more than  hours while in bed  -Keep HOB at degrees     Toileting:  -Offer bedside commode  -Assess for incontinence every   -Use incontinent care products after each incontinent episode such as Activity:  -Mobilize patient  times a day  -Encourage activity and walks on unit  -Encourage or provide ROM exercises   -Turn and reposition patient every  Hours  -Use appropriate equipment to lift or move patient in bed  -Instruct/ Assist with weight shifting every  when out of bed in chair  -Consider limitation of chair time  hour intervals    Skin Care:  -Avoid use of baby powder, tape, friction and shearing, hot water or constrictive clothing  -Relieve pressure over bony prominences using   -Do not massage red bony areas    Next Steps:  -Teach patient strategies to minimize risks such as    -Consider consults to  interdisciplinary teams such as   Outcome: Progressing  Goal: Incision(s), wounds(s) or drain site(s) healing without S/S of infection  Description: INTERVENTIONS  - Assess and document dressing, incision, wound bed, drain sites and surrounding tissue  - Provide patient and family education  - Perform skin care/dressing changes every   Outcome: Progressing  Goal: Pressure injury heals and does not worsen  Description: Interventions:  - Implement low air loss mattress or specialty surface (Criteria met)  - Apply silicone foam dressing  - Instruct/assist with weight shifting every  minutes when in chair   - Limit chair time to  hour intervals  - Use special pressure reducing interventions such as  when in chair   - Apply fecal or urinary incontinence containment device   - Perform passive or active ROM every   - Turn and reposition patient & offload bony prominences every  hours   - Utilize friction reducing device or surface for transfers   - Consider consults to  interdisciplinary teams such as   - Use incontinent care products after each incontinent episode such as  - Consider nutrition services referral as needed  Outcome: Progressing coffee/tea

## 2023-04-07 NOTE — H&P PST ADULT - NSICDXPASTMEDICALHX_GEN_ALL_CORE_FT
PAST MEDICAL HISTORY:  Anemia     Arthritis     Bladder cancer x 20 yrs    Current every day smoker     Emphysema lung diagnosed ct scan 2020    Enlarged prostate     HTN (hypertension)     Prediabetes     Right inguinal hernia     Ureter cancer, right 2017 no chemo     PAST MEDICAL HISTORY:  Anemia     Arthritis     Bladder cancer x 20 yrs    Current every day smoker     Emphysema lung diagnosed ct scan 2020    Enlarged prostate BPH    H/O eye surgery     HTN (hypertension)     Left inguinal hernia     Right inguinal hernia     Ureter cancer, right 2017 no chemo

## 2023-04-07 NOTE — H&P PST ADULT - PRO TOBACCO QUIT ATTEMPTS
nicotine replacement therapy stopped nicotine replacement- "it didn't work"/nicotine replacement therapy

## 2023-04-07 NOTE — H&P PST ADULT - PROBLEM SELECTOR PLAN 1
-Scheduled for  LEFT STENT EXCHANGE 4/19/2023  -CBC, BMP, and urine culture today in PST  -Medical clearance pending  - -Scheduled for  LEFT STENT EXCHANGE 4/19/2023  -CBC, BMP, and urine culture today in PST  -Medical clearance pending- appt 4/11/23  -Preop instructions and surgical scrub provided; Patient stated understanding using teach back method; A copy of written instructions also provided  -Adequate time provided for questions and answers   -advised to take amlodipine am DOS with sip of water  -LR and antibiotics ordered DOS per protocol

## 2023-04-07 NOTE — H&P PST ADULT - NS MD HP INPLANTS MED DEV
left urinary stent; screws in eye socket/None left urinary stent; screws in eye socket (11/2022)/None

## 2023-04-08 LAB
CULTURE RESULTS: SIGNIFICANT CHANGE UP
SPECIMEN SOURCE: SIGNIFICANT CHANGE UP

## 2023-04-18 ENCOUNTER — TRANSCRIPTION ENCOUNTER (OUTPATIENT)
Age: 73
End: 2023-04-18

## 2023-04-19 ENCOUNTER — OUTPATIENT (OUTPATIENT)
Dept: OUTPATIENT SERVICES | Facility: HOSPITAL | Age: 73
LOS: 1 days | End: 2023-04-19
Payer: MEDICARE

## 2023-04-19 ENCOUNTER — TRANSCRIPTION ENCOUNTER (OUTPATIENT)
Age: 73
End: 2023-04-19

## 2023-04-19 ENCOUNTER — APPOINTMENT (OUTPATIENT)
Dept: UROLOGY | Facility: HOSPITAL | Age: 73
End: 2023-04-19

## 2023-04-19 VITALS
DIASTOLIC BLOOD PRESSURE: 52 MMHG | OXYGEN SATURATION: 96 % | TEMPERATURE: 98 F | RESPIRATION RATE: 18 BRPM | HEART RATE: 80 BPM | SYSTOLIC BLOOD PRESSURE: 119 MMHG

## 2023-04-19 VITALS
RESPIRATION RATE: 18 BRPM | HEIGHT: 63 IN | OXYGEN SATURATION: 97 % | DIASTOLIC BLOOD PRESSURE: 61 MMHG | SYSTOLIC BLOOD PRESSURE: 132 MMHG | HEART RATE: 68 BPM | WEIGHT: 134.92 LBS | TEMPERATURE: 97 F

## 2023-04-19 DIAGNOSIS — N13.30 UNSPECIFIED HYDRONEPHROSIS: ICD-10-CM

## 2023-04-19 DIAGNOSIS — Z98.890 OTHER SPECIFIED POSTPROCEDURAL STATES: Chronic | ICD-10-CM

## 2023-04-19 DIAGNOSIS — Z90.5 ACQUIRED ABSENCE OF KIDNEY: Chronic | ICD-10-CM

## 2023-04-19 DIAGNOSIS — C67.9 MALIGNANT NEOPLASM OF BLADDER, UNSPECIFIED: ICD-10-CM

## 2023-04-19 DIAGNOSIS — Z98.41 CATARACT EXTRACTION STATUS, RIGHT EYE: Chronic | ICD-10-CM

## 2023-04-19 DIAGNOSIS — N40.1 BENIGN PROSTATIC HYPERPLASIA WITH LOWER URINARY TRACT SYMPTOMS: ICD-10-CM

## 2023-04-19 DIAGNOSIS — Z00.00 ENCOUNTER FOR GENERAL ADULT MEDICAL EXAMINATION WITHOUT ABNORMAL FINDINGS: ICD-10-CM

## 2023-04-19 PROCEDURE — 52332 CYSTOSCOPY AND TREATMENT: CPT | Mod: 22,LT

## 2023-04-19 PROCEDURE — 74420 UROGRAPHY RTRGR +-KUB: CPT | Mod: 26

## 2023-04-19 PROCEDURE — C1769: CPT

## 2023-04-19 PROCEDURE — 76000 FLUOROSCOPY <1 HR PHYS/QHP: CPT

## 2023-04-19 PROCEDURE — 52332 CYSTOSCOPY AND TREATMENT: CPT | Mod: LT

## 2023-04-19 PROCEDURE — C1758: CPT

## 2023-04-19 PROCEDURE — C2617: CPT

## 2023-04-19 DEVICE — IMPLANTABLE DEVICE: Type: IMPLANTABLE DEVICE | Status: FUNCTIONAL

## 2023-04-19 DEVICE — URETERAL CATH OPEN END 5FR 70CM: Type: IMPLANTABLE DEVICE | Status: FUNCTIONAL

## 2023-04-19 DEVICE — STENT URET INLAY 6FRX26CM 1USE W/O GD WIRE: Type: IMPLANTABLE DEVICE | Status: FUNCTIONAL

## 2023-04-19 DEVICE — GUIDEWIRE SENSOR DUAL-FLEX NITINOL STRAIGHT .035" X 150CM: Type: IMPLANTABLE DEVICE | Status: FUNCTIONAL

## 2023-04-19 RX ORDER — CEFAZOLIN SODIUM 1 G
2000 VIAL (EA) INJECTION ONCE
Refills: 0 | Status: COMPLETED | OUTPATIENT
Start: 2023-04-19 | End: 2023-04-19

## 2023-04-19 RX ORDER — CEPHALEXIN 500 MG
1 CAPSULE ORAL
Qty: 6 | Refills: 0
Start: 2023-04-19 | End: 2023-04-21

## 2023-04-19 RX ADMIN — SODIUM CHLORIDE 100 MILLILITER(S): 9 INJECTION, SOLUTION INTRAVENOUS at 13:04

## 2023-04-19 NOTE — ASU PATIENT PROFILE, ADULT - FALL HARM RISK - RISK INTERVENTIONS

## 2023-04-19 NOTE — ASU DISCHARGE PLAN (ADULT/PEDIATRIC) - NURSING INSTRUCTIONS
Next dose of Tylenol will be on or after ____8pm_______ ,today/tonight and every 6 hours afterwards for pain management, do not take any Tylenol containing products until this time. Your first dose of Tylenol was given at __2pm_________. Do not exceed more than 4000mg of Tylenol in one 24 hour setting. If no contraindications, you may alternate with Ibuprofen 3 hours after dose of Tylenol. Ibuprofen can be taken every 6 hours.

## 2023-04-19 NOTE — PRE-ANESTHESIA EVALUATION ADULT - LAST CARDIAC ANGIOGRAM/IMAGING
[facility]  Group Therapy    Date of Service: 4/18/2023  Start time: 1100  Stop time: 1150  Type of session: Therapy Group    Problem number: 1Dep. F33.0    Short term goal (STG): O Pt is exploring ways to find purpose at this stage of her life Pt will work to identify a sense of purpose for this phase of her life and how that purpose can increase her wellbeing and satisfaction and share with group.       Intervention/techniques: Informed, Validated/Supported, Prompted/Cued, Observed/Monitored, and Reinforced    Patient mental status/affect: Calm and Congruent    Patient behavior/appearance: Attentive, Cooperative, Needed Prompting, and Withdrawn/Quiet    Special patient treatment accommodations provided (describe): None    Patient response and progress towards goals: Focus of session was on developing a healthy stress management plan for dealing with triggers to stress, anxiety, and worry. We spoke about the importance of focusing on life experiences that have strengthened me and has taught us how to manage stress. We also spoke about the importance of having a positive support network of people who can help nurture us and encourage us, we spoke about attitudes and beliefs that have helped to protect us and help us view things differently, we addressed physical self care habits and that prepare us or help us relieve tension, as well as action skills that we can use to change the situation we are in right at the moment of experiencing stress. Pt listened to the group activity and discussion. She responded in a supportive way to another pt that had concerns. She was attentive but remained quiet for the rest of the session denies

## 2023-04-19 NOTE — ASU DISCHARGE PLAN (ADULT/PEDIATRIC) - ACTIVITY LEVEL
Cardiology Consult Note    Date of service: 1/29/2018    We are being asked to see Valentin Cazares at the request of Dr. White Ask  for evaluation of elevated troponin. HPI:  Valentin Cazares is a 59year old male with past medical history notable for hypertension and hyperlipidemia. He presented to the ER on 1/27 for acute upper abdominal pain. He was found to have free air on CT abdomen and was taken to OR with repair of perforated duodenal ulcer. Post operatively he became confused and combative and noted to have respiratory distress. He was transferred to the ICU and subsequently intubated. He is sedated currently on fentanyl and propofol. He also reportedly drinks multiple glasses of wine daily. There is some concern for alcohol withdrawal.  A troponin was checked and found to be elevated at 1.13 and has peaked at 2.3, and trended down at 1.84 at this time. There are some T wave inversions in lead III and aVF, no significant ST segment elevations or depression. He was started on heparin drip last night. He is NPO. Prior to admission family reports patient was complaining of some increasing heart burn but they deny and chest pain or dyspnea or palpitations. He does have an essential tremor as well which has been bothering him. He is a current every day smoker. He does not have known coronary artery disease. He exercises on elliptical 2 days per week with no complaints with exercise. History for this patient is obtained from patient's family as the patient is currently sedated and intubated. ROS:  10-point review of systems reviewed and negative unless otherwise stated above.      PMHx:  Patient Active Problem List    Diagnosis Date Noted   â¢ Perforated duodenal ulcer (CMS/[facility]) 01/29/2018     Priority: Low   â¢ Alcohol abuse 01/29/2018     Priority: Low   â¢ Acute delirium 01/29/2018     Priority: Low   â¢ Respiratory distress following surgery 01/29/2018     Priority: Low   â¢ Non-ST elevation MI (NSTEMI) (CMS/[facility]) 01/29/2018     Priority: Low   â¢ Prediabetes 02/17/2017     Priority: Low   â¢ RBD (REM behavioral disorder) 04/17/2015     Priority: Low   â¢ Tremor 12/16/2014     Priority: Low   â¢ Disordered sleep 12/16/2014     Priority: Low   â¢ Hypertension 10/24/2014     Priority: Low   â¢ Hyperlipidemia 10/23/2013     Priority: Low   â¢ Vitamin D deficiency 10/23/2013     Priority: Low   â¢ Low back pain 10/23/2013     Priority: Low       Medications:  Scheduled Meds:  â¢ fentaNYL       â¢ chlorhexidine gluconate  15 mL Swish & Spit 2 times per day   â¢ petrolatum(white)/mineral oil/NaCL  1 application Both Eyes 6 times per day   â¢ nicotine  1 patch Transdermal Daily    And   â¢ nicotine  1 patch Transdermal Daily   â¢ thiamine  100 mg Intravenous Daily   â¢ albuterol-ipratropium 2.5 mg/0.5 mg  3 mL Nebulization 4x Daily Resp   â¢ budesonide  0.5 mg Nebulization BID Resp   â¢ fluconazole (DIFLUCAN) IVPB  200 mg Intravenous Daily   â¢ sodium chloride (PF)  2 mL Injection 2 times per day   â¢ piperacillin-tazobactam (ZOSYN) extended interval IVPB  3.375 g Intravenous 3 times per day   â¢ metoPROLOL  10 mg Intravenous 2 times per day   â¢ famotidine  20 mg Intravenous 2 times per day     Continuous Infusions:  â¢ propofol (DIPRIVAN) infusion 30 mcg/kg/min (01/29/18 0721)   â¢ heparin (porcine) 13466 units/500 mL dextrose 5% standard infusion 12 Units/kg/hr (01/29/18 0722)   â¢ fentaNYL 1000 mcg in sodium chloride 0.9% 100 mL infusion premix 50 mcg/hr (01/29/18 0836)   â¢ lactated ringers infusion 50 mL/hr at 01/29/18 0631     PRN Meds:.fentaNYL, fentaNYL, chlorhexidine gluconate **AND** chlorhexidine gluconate, propofol (DIPRIVAN) infusion **AND** Triglyceride, heparin (porcine), albuterol-ipratropium 2.5 mg/0.5 mg, haloperidol, potassium chloride, potassium chloride, potassium chloride, potassium chloride, potassium chloride, potassium chloride, sodium chloride, acetaminophen, sodium chloride (PF), HYDROmorphone, ondansetron, prochlorperazine, hydrALAZINE, HYDROmorphone    Allergies:  ALLERGIES:  No Known Allergies    Social Hx:  History   Smoking Status   â¢ Current Every Day Smoker   â¢ Packs/day: 0.50   â¢ Years: 40.00   â¢ Types: Cigarettes   Smokeless Tobacco   â¢ Never Used     History   Alcohol Use   â¢ 12.0 oz/week   â¢ 20 Standard drinks or equivalent per week     Comment: 3 glasses of wine per night       Family Hx:  Family History   Problem Relation Age of Onset   â¢ Diabetes Father 77   Father passed away from a stroke. Mother passed away from heart attack. Sister with breast cancer. Physical Exam:     Last value Range last 48 hrs   Temperature Temp: 99.9 Â°F (37.7 Â°C) Temp  Min: 97.7 Â°F (36.5 Â°C)  Max: 101.1 Â°F (38.4 Â°C)   Heart Rate Pulse: 66 Pulse  Min: 62  Max: 91   Blood Pressure BP: 126/75 BP  Min: 93/63  Max: 164/95   Respiratory Rate Resp: 22 Resp  Min: 16  Max: 59   SpO2 SpO2: 99 % O2 Flow Rate (L/min)  Av.7 L/min  Min: 1.5 L/min   Min taken time: 18 1027  Max: 40 L/min   Max taken time: 18 1120     Patient Vitals for the past 168 hrs:   Weight   18 0500 73.3 kg   18 1230 76.2 kg   18 1300 76.6 kg   18 0413 72.6 kg       General: Pt is ill appearing, sedated and intubated. HEENT: Oropharynx without edema. Neck: no carotid, no JVD  Lungs: clear with no wheezes or crackles. He is intubated with mechanical ventilation  Cardiac: Regular without appreciable murmurs/gallops/rubs, normal S1S2, PMI non-displaced  Abdomen: Soft, no distension. Mid abdominal surgical dressing with no drainage.    Extremities: No clubbing/cyanosis, no LE edema, 2+ DP/PT pulses bilaterally  Skin: Warm and dry  Neuro: patient is sedated, mildly agitated at times      Labs:    Recent Labs  Lab 18  0459 18  0211 18  1227   WBC 11.7* 10.7 10.8   HGB 12.8* 13.1 12.9*   HCT 37.0* 37.8* 37.5*    157 162       Recent Labs  Lab 18  0459 18  1224 SODIUM 139 138 139   POTASSIUM 3.3* 3.6 3.7   CHLORIDE 102 103 104   CO2 28 28 26   BUN 18 17 19   CREATININE 0.96 0.88 0.81       Recent Labs  Lab 01/29/18  0459 01/28/18 2024 01/28/18  1227   AST 60* 66* 69*   GPT 37 40 38   BILIRUBIN 0.5 0.4 0.3       Recent Labs  Lab 01/29/18  0459 01/28/18 2024 01/28/18  1227   CALCIUM 8.5 8.3* 8.2*           Recent Labs  Lab 01/27/18  0412   INR 1.0       Recent Labs  Lab 01/29/18  0920 01/29/18  0211 01/28/18 2024   RAPDTR 1.84* 2.38* 1.98*       B-TYPE NATRIURETIC PEPTIDE (pg/mL)   Date Value   01/29/2018 328 (H)     TSH (mcUnits/mL)   Date Value   09/17/2014 0.726     Hemoglobin A1C (%)   Date Value   08/04/2017 6.2 (H)   02/10/2017 6.1 (H)     CHOLESTEROL (mg/dL)   Date Value   08/04/2017 191     CALCULATED LDL (mg/dL)   Date Value   08/04/2017 114           Diagnostics:  ECG: sinus rhythm with t wave inversions in leads III and aVF, non specific ST segment changes with no elevation or depression. Echo (1/29/18): No wall motion abnormalities and normal LV ejection fraction    Telemetry:  Sinus rhythm with rates 50s-60s    Imaging:  Chest xray (1/29/18) Findings: Endotracheal tube tip is approximately 4 cm above the liban. There is a nasogastric tube within the stomach. Â   Heart size is unremarkable. Prominent interstitial and alveolar airspace  opacities within both lungs most pronounced in the hilar regions are  unchanged, edema and/or pneumonitis. Â   No pleural effusions or pneumothorax. Additional retrocardiac atelectasis  or consolidation. Impression/Recommendations:    Elevated troponin, question NSTEMI. Given critical condition of patient and down trending troponin would recommend medical management at this time. For now continue with heparin. Will hold on ASA and plavix at this time given recent surgery, can start once cleared by surgery.  Ideally would be started on betablocker at some point but he may not tolerate it given baseline HR in 50s-60s. Hypertension-   Lisinopril reported on outpatient basis but has been held, he is receiving lasix. Blood pressures are controlled. Restart lisinopril once stable. Hyperlipidemia- appears he was on simvastatin on outpatient basis, this can be restarted as his condition improves  Tobacco use disorder, can continue with nicotine patch  Hypokalemia- agree to continue with  replacement therapy. Alcohol abuse/ withdrawal, perforated duodenal ulcer, delirium, respiratory failure - as managed by hospitalist team      The patient was seen and examined by Dr Faith Hagen MD. The plan of care was determined by Dr Faith Hagen MD.  The patient's echocardiogram showed normal systolic function. Contrast was used for better visualization of the endocardium. The troponin rise is mild and EKG changes are nonspecific. His wife reports that he is been exercising regularly aerobically up until last week. We will need to question the patient when he is extubated and alert. At this point unfractionated heparin is a reasonable option. We will follow the troponin curve and the patient will require ischemia evaluation with stress testing or diagnostic cardiac catheterization if historically he has symptoms compatible with angina.  Patria Conti MD 1/29/2018    Anthony Burton, [address]  01/29/18  11:43 AM No heavy lifting/Weight bearing as tolerated

## 2023-04-19 NOTE — ASU PATIENT PROFILE, ADULT - NSICDXPASTMEDICALHX_GEN_ALL_CORE_FT
PAST MEDICAL HISTORY:  Anemia     Arthritis     Bladder cancer x 20 yrs    Current every day smoker     Emphysema lung diagnosed ct scan 2020    Enlarged prostate BPH    H/O eye surgery     HTN (hypertension)     Left inguinal hernia     Right inguinal hernia     Ureter cancer, right 2017 no chemo

## 2023-04-19 NOTE — ASU DISCHARGE PLAN (ADULT/PEDIATRIC) - NS MD DC FALL RISK RISK
For information on Fall & Injury Prevention, visit: [email or web address] OR  [email or web address] OR  https://www.cdc.gov/steadi/patient.html

## 2023-04-19 NOTE — ASU DISCHARGE PLAN (ADULT/PEDIATRIC) - ASU DC SPECIAL INSTRUCTIONSFT
You may take Tylenol over the counter as directed for pain relief.   Drink plenty of fluids.  Follow up with your urologist in 5 months.  Seek medical attention if you have:  - excessive blood in the urine  - inability to urinate  - fever of 100.4 or greater  - symptoms such as fatigue, chills, nausea, vomiting

## 2023-05-26 ENCOUNTER — OUTPATIENT (OUTPATIENT)
Dept: OUTPATIENT SERVICES | Facility: HOSPITAL | Age: 73
LOS: 1 days | Discharge: HOME | End: 2023-05-26
Payer: MEDICARE

## 2023-05-26 VITALS
HEART RATE: 74 BPM | SYSTOLIC BLOOD PRESSURE: 126 MMHG | RESPIRATION RATE: 18 BRPM | OXYGEN SATURATION: 98 % | TEMPERATURE: 98 F | HEIGHT: 64 IN | WEIGHT: 137.13 LBS | DIASTOLIC BLOOD PRESSURE: 68 MMHG

## 2023-05-26 DIAGNOSIS — Z98.41 CATARACT EXTRACTION STATUS, RIGHT EYE: Chronic | ICD-10-CM

## 2023-05-26 DIAGNOSIS — Z98.890 OTHER SPECIFIED POSTPROCEDURAL STATES: Chronic | ICD-10-CM

## 2023-05-26 DIAGNOSIS — I10 ESSENTIAL (PRIMARY) HYPERTENSION: ICD-10-CM

## 2023-05-26 DIAGNOSIS — K40.20 BILATERAL INGUINAL HERNIA, WITHOUT OBSTRUCTION OR GANGRENE, NOT SPECIFIED AS RECURRENT: ICD-10-CM

## 2023-05-26 DIAGNOSIS — Z96.0 PRESENCE OF UROGENITAL IMPLANTS: Chronic | ICD-10-CM

## 2023-05-26 DIAGNOSIS — Z01.818 ENCOUNTER FOR OTHER PREPROCEDURAL EXAMINATION: ICD-10-CM

## 2023-05-26 DIAGNOSIS — Z90.5 ACQUIRED ABSENCE OF KIDNEY: Chronic | ICD-10-CM

## 2023-05-26 DIAGNOSIS — J44.9 CHRONIC OBSTRUCTIVE PULMONARY DISEASE, UNSPECIFIED: ICD-10-CM

## 2023-05-26 LAB
ANION GAP SERPL CALC-SCNC: 6 MMOL/L — SIGNIFICANT CHANGE UP (ref 5–17)
BUN SERPL-MCNC: 48 MG/DL — HIGH (ref 7–23)
CALCIUM SERPL-MCNC: 7.8 MG/DL — LOW (ref 8.5–10.1)
CHLORIDE SERPL-SCNC: 105 MMOL/L — SIGNIFICANT CHANGE UP (ref 96–108)
CO2 SERPL-SCNC: 27 MMOL/L — SIGNIFICANT CHANGE UP (ref 22–31)
CREAT SERPL-MCNC: 2.32 MG/DL — HIGH (ref 0.5–1.3)
EGFR: 29 ML/MIN/1.73M2 — LOW
GLUCOSE SERPL-MCNC: 129 MG/DL — HIGH (ref 70–99)
HCT VFR BLD CALC: 40.3 % — SIGNIFICANT CHANGE UP (ref 39–50)
HGB BLD-MCNC: 13 G/DL — SIGNIFICANT CHANGE UP (ref 13–17)
MCHC RBC-ENTMCNC: 28.5 PG — SIGNIFICANT CHANGE UP (ref 27–34)
MCHC RBC-ENTMCNC: 32.3 G/DL — SIGNIFICANT CHANGE UP (ref 32–36)
MCV RBC AUTO: 88.4 FL — SIGNIFICANT CHANGE UP (ref 80–100)
NRBC # BLD: 0 /100 WBCS — SIGNIFICANT CHANGE UP (ref 0–0)
PLATELET # BLD AUTO: 327 K/UL — SIGNIFICANT CHANGE UP (ref 150–400)
POTASSIUM SERPL-MCNC: 4.5 MMOL/L — SIGNIFICANT CHANGE UP (ref 3.5–5.3)
POTASSIUM SERPL-SCNC: 4.5 MMOL/L — SIGNIFICANT CHANGE UP (ref 3.5–5.3)
RBC # BLD: 4.56 M/UL — SIGNIFICANT CHANGE UP (ref 4.2–5.8)
RBC # FLD: 13.4 % — SIGNIFICANT CHANGE UP (ref 10.3–14.5)
SODIUM SERPL-SCNC: 138 MMOL/L — SIGNIFICANT CHANGE UP (ref 135–145)
WBC # BLD: 8.29 K/UL — SIGNIFICANT CHANGE UP (ref 3.8–10.5)
WBC # FLD AUTO: 8.29 K/UL — SIGNIFICANT CHANGE UP (ref 3.8–10.5)

## 2023-05-26 PROCEDURE — 93010 ELECTROCARDIOGRAM REPORT: CPT

## 2023-05-26 NOTE — H&P PST ADULT - HISTORY OF PRESENT ILLNESS
73M PMH HTN, current smoker ("mild emphysema" detected on CT 2020 & 2023), right urothelial carcinoma s/p right nephroureterectomy, bladder cancer, left ureteral stent change for ureteral obstruction every ~6 months c/o inguinal swelling 2/2 bilateral inguinal hernia here for PsT for scheduled robotic assisted laparoscopic bilateral inguinal hernia repair with mesh   This patient denies any fever, cough, sob, flu like symptoms or travel outside of the [place] in the past 30 days

## 2023-05-26 NOTE — H&P PST ADULT - ATTENDING COMMENTS
I spoke with the patient regarding the planned robotic-assisted laparoscopic bilateral inguinal hernia repair with mesh, possible open, and we discussed the indications, risks, benefits, and alternatives of the procedure, including the use of mesh. We also discussed that should the minimally invasive approach prove difficult, that should the open approach be performed, only the right side would be repaired. The patient stated that he understood, gave consent, and all his questions were answered. Sites were marked.

## 2023-05-26 NOTE — H&P PST ADULT - ASSESSMENT
73M PMH HTN, current smoker ("mild emphysema" detected on CT  & ), right urothelial carcinoma s/p right nephroureterectomy, bladder cancer, left ureteral stent change for ureteral obstruction every ~6 months c/o inguinal swelling 2/2 bilateral inguinal hernia here for PsT for scheduled robotic assisted laparoscopic bilateral inguinal hernia repair with mesh  CAPRINI SCORE [CLOT]    AGE RELATED RISK FACTORS                                                       MOBILITY RELATED FACTORS  [ ] Age 41-60 years                                            (1 Point)                  [ ] Bed rest                                                        (1 Point)  [ ] Age: 61-74 years                                           (2 Points)                 [ ] Plaster cast                                                   (2 Points)  [ x] Age= 75 years                                              (3 Points)                 [ ] Bed bound for more than 72 hours                 (2 Points)    DISEASE RELATED RISK FACTORS                                               GENDER SPECIFIC FACTORS  [ ] Edema in the lower extremities                       (1 Point)                  [ ] Pregnancy                                                     (1 Point)  [ ] Varicose veins                                               (1 Point)                  [ ] Post-partum < 6 weeks                                   (1 Point)             [ ] BMI > 25 Kg/m2                                            (1 Point)                  [ ] Hormonal therapy  or oral contraception          (1 Point)                 [ ] Sepsis (in the previous month)                        (1 Point)                  [ ] History of pregnancy complications                 (1 point)  [ ] Pneumonia or serious lung disease                                               [ ] Unexplained or recurrent                     (1 Point)           (in the previous month)                               (1 Point)  [ ] Abnormal pulmonary function test                     (1 Point)                 SURGERY RELATED RISK FACTORS  [ ] Acute myocardial infarction                              (1 Point)                 [ ]  Section                                             (1 Point)  [ ] Congestive heart failure (in the previous month)  (1 Point)               [ ] Minor surgery                                                  (1 Point)   [ ] Inflammatory bowel disease                             (1 Point)                 [ ] Arthroscopic surgery                                        (2 Points)  [ ] Central venous access                                      (2 Points)                [x ] General surgery lasting more than 45 minutes   (2 Points)       [ ] Stroke (in the previous month)                          (5 Points)               [ ] Elective arthroplasty                                         (5 Points)                                                                                                                                               HEMATOLOGY RELATED FACTORS                                                 TRAUMA RELATED RISK FACTORS  [ ] Prior episodes of VTE                                     (3 Points)                [ ] Fracture of the hip, pelvis, or leg                       (5 Points)  [ ] Positive family history for VTE                         (3 Points)                 [ ] Acute spinal cord injury (in the previous month)  (5 Points)  [ ] Prothrombin 48173 A                                     (3 Points)                 [ ] Paralysis  (less than 1 month)                             (5 Points)  [ ] Factor V Leiden                                             (3 Points)                  [ ] Multiple Trauma within 1 month                        (5 Points)  [ ] Lupus anticoagulants                                     (3 Points)                                                           [ ] Anticardiolipin antibodies                               (3 Points)                                                       [ ] High homocysteine in the blood                      (3 Points)                                             [ ] Other congenital or acquired thrombophilia      (3 Points)                                                [ ] Heparin induced thrombocytopenia                  (3 Points)                                          Total Score [    5    ]    Caprini Score 0 - 2:  Low Risk, No VTE Prophylaxis required for most patients, encourage ambulation  Caprini Score 3 - 6:  At Risk, pharmacologic VTE prophylaxis is indicated for most patients (in the absence of a contraindication)  Caprini Score Greater than or = 7:  High Risk, pharmacologic VTE prophylaxis is indicated for most patients (in the absence of a contraindication)

## 2023-05-26 NOTE — H&P PST ADULT - PROBLEM SELECTOR PLAN 1
robotic assisted laparoscopic bilateral inguinal hernia repair  Pre-op instructions given by RN, patient verbalized understanding  Chlorhexidine wash instructions given   medical clearance

## 2023-06-08 ENCOUNTER — TRANSCRIPTION ENCOUNTER (OUTPATIENT)
Age: 73
End: 2023-06-08

## 2023-06-09 ENCOUNTER — APPOINTMENT (OUTPATIENT)
Dept: SURGERY | Facility: HOSPITAL | Age: 73
End: 2023-06-09

## 2023-06-09 ENCOUNTER — TRANSCRIPTION ENCOUNTER (OUTPATIENT)
Age: 73
End: 2023-06-09

## 2023-06-09 ENCOUNTER — OUTPATIENT (OUTPATIENT)
Dept: OUTPATIENT SERVICES | Facility: HOSPITAL | Age: 73
LOS: 1 days | Discharge: HOME | End: 2023-06-09
Payer: MEDICARE

## 2023-06-09 VITALS
DIASTOLIC BLOOD PRESSURE: 64 MMHG | SYSTOLIC BLOOD PRESSURE: 115 MMHG | HEART RATE: 82 BPM | OXYGEN SATURATION: 96 % | RESPIRATION RATE: 17 BRPM

## 2023-06-09 VITALS
DIASTOLIC BLOOD PRESSURE: 72 MMHG | WEIGHT: 134.92 LBS | OXYGEN SATURATION: 98 % | HEART RATE: 76 BPM | TEMPERATURE: 98 F | SYSTOLIC BLOOD PRESSURE: 122 MMHG | HEIGHT: 64 IN | RESPIRATION RATE: 17 BRPM

## 2023-06-09 DIAGNOSIS — Z90.5 ACQUIRED ABSENCE OF KIDNEY: Chronic | ICD-10-CM

## 2023-06-09 DIAGNOSIS — Z98.890 OTHER SPECIFIED POSTPROCEDURAL STATES: Chronic | ICD-10-CM

## 2023-06-09 DIAGNOSIS — Z98.41 CATARACT EXTRACTION STATUS, RIGHT EYE: Chronic | ICD-10-CM

## 2023-06-09 DIAGNOSIS — Z96.0 PRESENCE OF UROGENITAL IMPLANTS: Chronic | ICD-10-CM

## 2023-06-09 PROCEDURE — 49650 LAP ING HERNIA REPAIR INIT: CPT | Mod: 50

## 2023-06-09 PROCEDURE — S2900 ROBOTIC SURGICAL SYSTEM: CPT | Mod: NC

## 2023-06-09 DEVICE — MESH HERNIA INGUINAL PROGRIP LAPAROSCOPIC 15 X 10CM LEFT: Type: IMPLANTABLE DEVICE | Site: BILATERAL | Status: FUNCTIONAL

## 2023-06-09 DEVICE — MESH HERNIA INGUINAL PROGRIP LAPAROSCOPIC RIGHT: Type: IMPLANTABLE DEVICE | Site: BILATERAL | Status: FUNCTIONAL

## 2023-06-09 RX ORDER — HYDROMORPHONE HYDROCHLORIDE 2 MG/ML
0.5 INJECTION INTRAMUSCULAR; INTRAVENOUS; SUBCUTANEOUS
Refills: 0 | Status: DISCONTINUED | OUTPATIENT
Start: 2023-06-09 | End: 2023-06-09

## 2023-06-09 RX ORDER — OXYCODONE HYDROCHLORIDE 5 MG/1
1 TABLET ORAL
Qty: 6 | Refills: 0
Start: 2023-06-09

## 2023-06-09 RX ORDER — SODIUM CHLORIDE 9 MG/ML
1000 INJECTION, SOLUTION INTRAVENOUS
Refills: 0 | Status: DISCONTINUED | OUTPATIENT
Start: 2023-06-09 | End: 2023-06-09

## 2023-06-09 RX ADMIN — SODIUM CHLORIDE 50 MILLILITER(S): 9 INJECTION, SOLUTION INTRAVENOUS at 11:01

## 2023-06-09 NOTE — ASU PREOP CHECKLIST - TEMPERATURE IN FAHRENHEIT (DEGREES F)
"Patient: Harsha Delaney  : 1952    Primary Care Provider: Jessie Simons MD    Requesting Provider: As above        History    Chief Complaint:   1.  Seizure disorder.  2.  Nonfunctioning vagal nerve stimulator.    History of Present Illness: Mr. 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  He's had lifelong seizures.  His seizures are apparently complex partial seizures that occur at night.  These occur several times a week.  They are thought to last for only a minute or 2.  He tenses up or stiffens with the seizures.  He has had 2 prior craniotomies for temporal lobe resections.  A vagal nerve stimulator was placed in 2007 at the [place].  The battery was changed out in 2012.  Apparently the unit is nonfunctional now.  Records from his neurologist suggest that lead impedances are high.    Review of Systems   Neurological: Positive for seizures.   All other systems reviewed and are negative.      The patient's past medical history, past surgical history, family history, and social history have been reviewed at length in the electronic medical record.    Physical Exam:   Visit Vitals   • Temp 97.4 °F (36.3 °C)   • Ht 69\" (175.3 cm)   • Wt 225 lb 6.4 oz (102 kg)   • BMI 33.29 kg/m2     CONSTITUTIONAL: Patient is well-nourished, pleasant and appears stated age.  CV: Heart regular rate and rhythm without murmur, rub, or gallop.  PULMONARY: Lungs are clear to ascultation.  MUSCULOSKELETAL:  Neck tenderness to palpation is not observed.   ROM in neck is normal.  His VNS battery sits on his mid left chest fairly low.  A pacemaker is present on the right side.  NEUROLOGICAL:  Orientation, memory, attention span, language function, and cognition have been examined and are intact.  Strength is intact in the upper and lower extremities to direct testing.  Muscle tone is normal throughout.  Station and gait are normal.  Sensation is intact to light touch testing " throughout.  Deep tendon reflexes are 2+ and symmetrical.  Viridiana's Sign is negative bilaterally. No clonus is elicited.  Coordination is intact.  CRANIAL NERVES:  Cranial Nerve II:  Visual fields are full to confrontation.  Cranial Nerve III, IV, and VI: PERRLADC. Extraocular movements are intact.  Nystagmus is not present.  Cranial Nerve V: Facial sensation is intact to light touch.  Cranial Nerve VII: Muscles of facial expression demonstate no weakness or asymmetry.  Cranial Nerve VIII: Hearing is intact to finger rub bilaterally.  Cranial Nerve IX and X: Palate elevates symmetrically.  Cranial Nerve XI: Shoulder shrug is intact bilaterally.  Cranial Nerve XII: Tongue is midline without evidence of atrophy or fasciculation.    Medical Decision Making    Data Review:   I have reviewed records from his neurologist.    Diagnosis:   1.  Nonfunctioning vagal nerve stimulator.  2.  Complex partial seizure disorder.    Treatment Options:   The patient is here for consideration of vagal nerve stimulator battery change out and revision.  Obviously the battery change out is a fairly straightforward measure.  The revision of the lead is potentially more complicated with higher risk given the scar tissue present from prior interventions.  The goal of surgery would be to get a functioning VNS system in place.  Simple battery change out would be a more simple endeavor.  If there is lead malfunction then we may need to change out the battery and lead.  The battery may also need to be repositioned given its current location.  Should we need to change out the lead then there is a higher risk of vagus nerve injury with resultant difficulties or the risk of substantial bleeding due to vascular injury.  The nature of the procedure as well as the potential risks, complications, limitations, and alternatives to the procedure were discussed at length with the patient and the patient has agreed to proceed with surgery.  Given some of  his cardiac issues formal cardiac clearance will be necessary before proceeding with the surgery.       Diagnosis Plan   1. Seizure disorder             I, Dr. Lopez, personally performed the services described in the documentation, as scribed in my presence, and it is both accurate and complete.  Scribed for Christiano Lopez MD by Rory Gallegos CMA on 01/28/2017 at 8:19 AM     97.7

## 2023-06-09 NOTE — ASU DISCHARGE PLAN (ADULT/PEDIATRIC) - CARE PROVIDER_API CALL
Wilfred Vaughan  Surgery  [address] [id number]  Phone: [phone]  Fax: [phone]  Follow Up Time: 2 weeks

## 2023-06-09 NOTE — BRIEF OPERATIVE NOTE - NSICDXBRIEFPROCEDURE_GEN_ALL_CORE_FT
PROCEDURES:  Robot-assisted laparoscopic repair of  bilateral inguinal hernia with bilateral insertion of mesh using da [employer] Marquita 09-Jun-2023 10:17:03  Merritt HART

## 2023-06-09 NOTE — ASU DISCHARGE PLAN (ADULT/PEDIATRIC) - NURSING INSTRUCTIONS
Discharged instructions and teachings done. Patient verbalizes understandings of teachings. Patient will f/u with md as plan.

## 2023-06-09 NOTE — ASU DISCHARGE PLAN (ADULT/PEDIATRIC) - NS MD DC FALL RISK RISK
For information on Fall & Injury Prevention, visit: [email or web address] OR  [email or web address] OR  https://www.cdc.gov/steadi/patient.html

## 2023-06-14 DIAGNOSIS — Z85.54 PERSONAL HISTORY OF MALIGNANT NEOPLASM OF URETER: ICD-10-CM

## 2023-06-14 DIAGNOSIS — J43.9 EMPHYSEMA, UNSPECIFIED: ICD-10-CM

## 2023-06-14 DIAGNOSIS — R73.03 PREDIABETES: ICD-10-CM

## 2023-06-14 DIAGNOSIS — I10 ESSENTIAL (PRIMARY) HYPERTENSION: ICD-10-CM

## 2023-06-14 DIAGNOSIS — K40.20 BILATERAL INGUINAL HERNIA, WITHOUT OBSTRUCTION OR GANGRENE, NOT SPECIFIED AS RECURRENT: ICD-10-CM

## 2023-06-14 DIAGNOSIS — Z85.51 PERSONAL HISTORY OF MALIGNANT NEOPLASM OF BLADDER: ICD-10-CM

## 2023-06-14 DIAGNOSIS — N40.0 BENIGN PROSTATIC HYPERPLASIA WITHOUT LOWER URINARY TRACT SYMPTOMS: ICD-10-CM

## 2023-06-14 DIAGNOSIS — F17.210 NICOTINE DEPENDENCE, CIGARETTES, UNCOMPLICATED: ICD-10-CM

## 2023-06-14 DIAGNOSIS — D64.9 ANEMIA, UNSPECIFIED: ICD-10-CM

## 2023-06-27 ENCOUNTER — APPOINTMENT (OUTPATIENT)
Dept: SURGERY | Facility: CLINIC | Age: 73
End: 2023-06-27
Payer: MEDICARE

## 2023-06-27 VITALS
SYSTOLIC BLOOD PRESSURE: 149 MMHG | BODY MASS INDEX: 22.71 KG/M2 | HEIGHT: 64 IN | TEMPERATURE: 98.2 F | WEIGHT: 133 LBS | OXYGEN SATURATION: 95 % | HEART RATE: 82 BPM | DIASTOLIC BLOOD PRESSURE: 69 MMHG

## 2023-06-27 PROCEDURE — 99024 POSTOP FOLLOW-UP VISIT: CPT

## 2023-06-27 NOTE — HISTORY OF PRESENT ILLNESS
[de-identified] : Patient is a 73 year old male who underwent robotic-assisted laparoscopic bilateral inguinal hernia repair with mesh on 6/9/2023 who presents for postoperative follow up. States that since the procedure, pain has been well controlled, denies N/V, no fever/chills, is tolerating diet well, and having normal bowel function.\par \par Examination of the abdomen reveals well-healing incisions without incisional hernia, non-TTP in all quadrants, no rebound/guarding. Testes are in normal anatomic position, no recurrence of inguinal hernias B/L, right seroma, no skin changes\par \par Patient is healing well, advised to avoid heavy lifting for total of 4 weeks postoperatively. Advised to apply warm compress to right groin region at night. Patient to follow in 3 months for evaluation of right seroma. Patient was agreeable to this plan and all his questions were answered to his apparent satisfaction.

## 2023-10-03 NOTE — ASU PATIENT PROFILE, ADULT - PAIN CHRONIC, PROFILE
ED pharmacist reviewed recent results of urine culture. The patient was treated with ciprofloxacin in the emergency department and received a prescription for ciprofloxacin upon discharge. Based on culture results, the patient is being adequately treated for the identified infection with existing antimicrobial therapy. No further intervention needed. Allergies as of 09/30/2023 - Fully Reviewed 09/30/2023   Allergen Reaction Noted    Compazine [prochlorperazine] Anaphylaxis 09/27/2022    Ketorolac Hives 07/19/2017    Nitrofurantoin Hives 08/12/2016    Sulfa antibiotics Swelling 12/10/2009     Inga RiosD.   Emergency Medicine Clinical Pharmacist no

## 2023-10-06 NOTE — H&P PST ADULT - ADMIT DATE
03-Nov-2020 Consent (Marginal Mandibular)/Introductory Paragraph: The rationale for Mohs was explained to the patient and consent was obtained. The risks, benefits and alternatives to therapy were discussed in detail. Specifically, the risks of damage to the marginal mandibular branch of the facial nerve, infection, scarring, bleeding, prolonged wound healing, incomplete removal, allergy to anesthesia, and recurrence were addressed. Prior to the procedure, the treatment site was clearly identified and confirmed by the patient. All components of Universal Protocol/PAUSE Rule completed.

## 2023-11-14 NOTE — ASU PATIENT PROFILE, ADULT - PRO ARRIVE FROM
Subjective   Patient ID: Jennifer is a 52 year old female.    Chief Complaint   Patient presents with   • Neck Pain     Fell from horse neck pain, vomiting. Incident happened on Saturday. Head is tender to touch. Left elbow bruised. Patient had helmet.    • Nausea     Started last night.   • Vomiting     Stared at 3 am.    • Dizziness     Started Sunday evening.      HPI  This is a 52-year-old female coming in with complaints of severe neck pain/posterior scalp contusion status post fall from riding a horse that happened 4 days ago.  Now with some vomiting and some dizziness also has a bruise to the left elbow but has full range of motion and this is not necessarily something of concern.  Patient admits to having had a helmet on and did not think much of it until her symptoms started just a couple days ago with dizziness and vomiting.  Patient was on Tylenol for the first 2 days of her symptoms and her  was monitoring her very closely.  No prior history of head injuries or concussions.  Patient denies any numbness or tingling or weakness.  Able to walk in immediate care without any assistance.    Past Medical History:   Diagnosis Date   • Kidney stones    • Malignant neoplasm (CMD)        MEDICATIONS:  Current Outpatient Medications   Medication Sig   • ondansetron (ZOFRAN ODT) 4 MG disintegrating tablet Place 1 tablet onto the tongue every 8 hours as needed for Nausea.   • predniSONE (DELTASONE) 20 MG tablet Take 2 tablets by mouth 2 times daily.   • tamoxifen (NOLVADEX) 20 MG tablet Take 20 mg by mouth daily.   • cyclobenzaprine (FLEXERIL) 5 MG tablet Take 5 mg by mouth as needed.   • mupirocin (Bactroban) 2 % ointment Apply topically 3 times daily.     No current facility-administered medications for this visit.       ALLERGIES:  ALLERGIES:   Allergen Reactions   • Clindamycin HIVES       PAST SURGICAL HISTORY:  Past Surgical History:   Procedure Laterality Date   • Breast lumpectomy Left        FAMILY  HISTORY:  Family History   Problem Relation Age of Onset   • Patient is unaware of any medical problems Mother    • Patient is unaware of any medical problems Father        SOCIAL HISTORY:  Social History     Tobacco Use   • Smoking status: Never   • Smokeless tobacco: Never   Vaping Use   • Vaping Use: never used   Substance Use Topics   • Alcohol use: Yes     Comment: somethmes   • Drug use: Never         Patient's medications, allergies, past medical, surgical, and social history  were reviewed and updated as appropriate.    Review of Systems   A 10 point review of systems was performed all of which is negative except as mentioned in the HPI above      Objective   Physical Exam  Visit Vitals  /78 (BP Location: LUE - Left upper extremity, Patient Position: Sitting, Cuff Size: Regular)   Pulse (!) 60   Temp 98.2 °F (36.8 °C) (Oral)   Resp 16   LMP 09/25/2023   SpO2 100%   GENERAL: well developed, well nourished,in no apparent distress  SKIN: no rashes,no suspicious lesions  Eyes: Pupils equally reactive to light bilaterally, extraocular movements intact bilaterally  HEENT: atraumatic, normocephalic, bilateral tympanic brains within normal limits, no hemotympanum  NECK: supple, normal neck range of motion, diffuse cervical spine tenderness and tenderness along both trapezius    LUNGS & CARDIO: Clear to auscultation bilaterally regular rate and rhythm  GI: not distended   NEURO: Alert and oriented to person, place and time   EXTREMITIES: no cyanosis, clubbing or edema      Assessment   Problem List Items Addressed This Visit    None  Visit Diagnoses     Closed head injury without loss of consciousness, initial encounter    -  Primary    Relevant Orders    CT CERVICAL SPINE WO CONTRAST (Completed)    CT HEAD WO CONTRAST (Completed)    Concussion without loss of consciousness, initial encounter        Whiplash injury to neck, initial encounter              Medical decision making  This is a 52 year old year-old  female who presents with complaints and H&P as above.  Patient here with symptoms of pain in her posterior scalp associated with neck pain and episodes of vomiting/dizziness status post head injury that occurred 4 days ago when she fell from a horse and hurt her head.  Patient shows no signs of toxicity/no signs of decompensation.  Temperature and saturations were normal.  Bright lights and loud noises are affecting her symptoms.  CT of the head and CT cervical spine were within normal limits.  This was reviewed with the patient.  Patient was monitored while here in immediate care and did very well while here.  She was able to stomach some sips of water.    CT CERVICAL SPINE WO CONTRAST    Result Date: 11/14/2023  Narrative PROCEDURE:  CT CERVICAL SPINE WO CONTRAST. COMPARISON: None. INDICATIONS: Injury with neck pain TECHNIQUES: Multiple axial images of CT CERVICAL SPINE WO CONTRAST were obtained using 1 mm collimation.  Sagittal and coronal reconstructed images were also acquired. Adjustment of the mA and/or kV was done based on the patient's size.  FINDINGS: There is generalized osteopenia. Straightening of the cervical spine is nonspecific. Vertebral body height is normal. No acute fracture or subluxation is seen. Early spondylosis is noted at LN2 exarticulation and C3-C7 levels with minimal marginal osteophyte formation and facet arthropathy. The craniocervical junction is unremarkable. Prevertebral soft tissues are within normal limits.     Impression No acute fracture or subluxation of the cervical spine. Electronically Signed by: NAI BRUCE MD Signed on: 11/14/2023 9:16 AM Workstation ID: [id number]    CT HEAD WO CONTRAST    Result Date: 11/14/2023  Narrative PROCEDURE: CT brain without contrast TECHNIQUE: Multiple axial images of CT head were obtained without IV contrast. Adjustment of the mA and/or kV was done based on the patient's size. COMPARISON: None. CLINICAL INDICATIONS: Closed head injury with  loss of consciousness headache. FINDINGS: The ventricles and cortical sulci are compatible with patient's age. The brain parenchyma demonstrates normal density and white-gray matter differentiation without evidence for acute hemorrhage, mass, midline shift, or abnormal fluid collections. The skull is intact.     Impression No CT evidence for acute intracranial process. If patient's symptoms persist or clinically indicated, MRI is recommended to followup. Electronically Signed by: NAI BRUCE MD Signed on: 11/14/2023 9:15 AM Workstation ID: [id number]    Reviewed CT results with the patient   Plan as discussed below.    Tylenol 1 g every 6-8 hours as needed for pain/headache  Avoid Motrin for the next 2 days  Follow-up primary care provider in 2 days  Keep very well-hydrated, avoid loud noises/bright lights  Avoid excessive screen time/avoid distance in detail/focusing on minute details  Will need continuous follow-up with primary care provider until you can return to regular activities  Avoid any strenuous activity/working out/anything that causes excessive shaking of your head    Patient is well appearing, non-toxic, VSS. Recommend follow up with PCP this week, return with new or worsening symptoms. Discussed that the [facility] is not an exhaustive resource and should not take the place of primary care. Go to ED precautions discussed at length.  All questions answered and  v/u of the plan.   Patient understands the importance of follow up with PMD.     • The 21st Century Cures Act makes medical notes like these available to patients in the interest of transparency. However, be advised this is a medical document. It is intended as peer to peer communication. It is written in medical language and may contain abbreviations, jargon, and other verbiage that could be misleading or confusing to lay persons. It may appear blunt or direct. Medical documents are intended to carry relevant information, facts as evident, and the  clinical opinion of the medical provider.     • This note was made using voice dictation and may include inadvertent errors due to the dictation software. Please contact for clarification regarding any noted discrepancies.      Instructions provided as documented in the AVS.    Thank you for visiting [facility].  Please follow up with your PCP In the next 3 to 5 days.   home

## 2023-11-15 ENCOUNTER — OUTPATIENT (OUTPATIENT)
Dept: OUTPATIENT SERVICES | Facility: HOSPITAL | Age: 73
LOS: 1 days | End: 2023-11-15
Payer: MEDICARE

## 2023-11-15 VITALS
WEIGHT: 134.92 LBS | HEIGHT: 64 IN | SYSTOLIC BLOOD PRESSURE: 155 MMHG | OXYGEN SATURATION: 98 % | RESPIRATION RATE: 16 BRPM | HEART RATE: 73 BPM | TEMPERATURE: 98 F | DIASTOLIC BLOOD PRESSURE: 75 MMHG

## 2023-11-15 DIAGNOSIS — Z98.890 OTHER SPECIFIED POSTPROCEDURAL STATES: Chronic | ICD-10-CM

## 2023-11-15 DIAGNOSIS — Z98.41 CATARACT EXTRACTION STATUS, RIGHT EYE: Chronic | ICD-10-CM

## 2023-11-15 DIAGNOSIS — Z96.0 PRESENCE OF UROGENITAL IMPLANTS: ICD-10-CM

## 2023-11-15 DIAGNOSIS — Z90.5 ACQUIRED ABSENCE OF KIDNEY: Chronic | ICD-10-CM

## 2023-11-15 DIAGNOSIS — Z01.818 ENCOUNTER FOR OTHER PREPROCEDURAL EXAMINATION: ICD-10-CM

## 2023-11-15 DIAGNOSIS — C67.9 MALIGNANT NEOPLASM OF BLADDER, UNSPECIFIED: ICD-10-CM

## 2023-11-15 DIAGNOSIS — Z96.0 PRESENCE OF UROGENITAL IMPLANTS: Chronic | ICD-10-CM

## 2023-11-15 LAB
ANION GAP SERPL CALC-SCNC: 12 MMOL/L — SIGNIFICANT CHANGE UP (ref 5–17)
ANION GAP SERPL CALC-SCNC: 12 MMOL/L — SIGNIFICANT CHANGE UP (ref 5–17)
BUN SERPL-MCNC: 47 MG/DL — HIGH (ref 7–23)
BUN SERPL-MCNC: 47 MG/DL — HIGH (ref 7–23)
CALCIUM SERPL-MCNC: 8 MG/DL — LOW (ref 8.4–10.5)
CALCIUM SERPL-MCNC: 8 MG/DL — LOW (ref 8.4–10.5)
CHLORIDE SERPL-SCNC: 104 MMOL/L — SIGNIFICANT CHANGE UP (ref 96–108)
CHLORIDE SERPL-SCNC: 104 MMOL/L — SIGNIFICANT CHANGE UP (ref 96–108)
CO2 SERPL-SCNC: 25 MMOL/L — SIGNIFICANT CHANGE UP (ref 22–31)
CO2 SERPL-SCNC: 25 MMOL/L — SIGNIFICANT CHANGE UP (ref 22–31)
CREAT SERPL-MCNC: 2.13 MG/DL — HIGH (ref 0.5–1.3)
CREAT SERPL-MCNC: 2.13 MG/DL — HIGH (ref 0.5–1.3)
EGFR: 32 ML/MIN/1.73M2 — LOW
EGFR: 32 ML/MIN/1.73M2 — LOW
GLUCOSE SERPL-MCNC: 135 MG/DL — HIGH (ref 70–99)
GLUCOSE SERPL-MCNC: 135 MG/DL — HIGH (ref 70–99)
HCT VFR BLD CALC: 39.1 % — SIGNIFICANT CHANGE UP (ref 39–50)
HCT VFR BLD CALC: 39.1 % — SIGNIFICANT CHANGE UP (ref 39–50)
HGB BLD-MCNC: 11.9 G/DL — LOW (ref 13–17)
HGB BLD-MCNC: 11.9 G/DL — LOW (ref 13–17)
MCHC RBC-ENTMCNC: 27.7 PG — SIGNIFICANT CHANGE UP (ref 27–34)
MCHC RBC-ENTMCNC: 27.7 PG — SIGNIFICANT CHANGE UP (ref 27–34)
MCHC RBC-ENTMCNC: 30.4 GM/DL — LOW (ref 32–36)
MCHC RBC-ENTMCNC: 30.4 GM/DL — LOW (ref 32–36)
MCV RBC AUTO: 90.9 FL — SIGNIFICANT CHANGE UP (ref 80–100)
MCV RBC AUTO: 90.9 FL — SIGNIFICANT CHANGE UP (ref 80–100)
NRBC # BLD: 0 /100 WBCS — SIGNIFICANT CHANGE UP (ref 0–0)
NRBC # BLD: 0 /100 WBCS — SIGNIFICANT CHANGE UP (ref 0–0)
PLATELET # BLD AUTO: 282 K/UL — SIGNIFICANT CHANGE UP (ref 150–400)
PLATELET # BLD AUTO: 282 K/UL — SIGNIFICANT CHANGE UP (ref 150–400)
POTASSIUM SERPL-MCNC: 4.9 MMOL/L — SIGNIFICANT CHANGE UP (ref 3.5–5.3)
POTASSIUM SERPL-MCNC: 4.9 MMOL/L — SIGNIFICANT CHANGE UP (ref 3.5–5.3)
POTASSIUM SERPL-SCNC: 4.9 MMOL/L — SIGNIFICANT CHANGE UP (ref 3.5–5.3)
POTASSIUM SERPL-SCNC: 4.9 MMOL/L — SIGNIFICANT CHANGE UP (ref 3.5–5.3)
RBC # BLD: 4.3 M/UL — SIGNIFICANT CHANGE UP (ref 4.2–5.8)
RBC # BLD: 4.3 M/UL — SIGNIFICANT CHANGE UP (ref 4.2–5.8)
RBC # FLD: 13.5 % — SIGNIFICANT CHANGE UP (ref 10.3–14.5)
RBC # FLD: 13.5 % — SIGNIFICANT CHANGE UP (ref 10.3–14.5)
SODIUM SERPL-SCNC: 141 MMOL/L — SIGNIFICANT CHANGE UP (ref 135–145)
SODIUM SERPL-SCNC: 141 MMOL/L — SIGNIFICANT CHANGE UP (ref 135–145)
WBC # BLD: 8.51 K/UL — SIGNIFICANT CHANGE UP (ref 3.8–10.5)
WBC # BLD: 8.51 K/UL — SIGNIFICANT CHANGE UP (ref 3.8–10.5)
WBC # FLD AUTO: 8.51 K/UL — SIGNIFICANT CHANGE UP (ref 3.8–10.5)
WBC # FLD AUTO: 8.51 K/UL — SIGNIFICANT CHANGE UP (ref 3.8–10.5)

## 2023-11-15 PROCEDURE — 85027 COMPLETE CBC AUTOMATED: CPT

## 2023-11-15 PROCEDURE — 80048 BASIC METABOLIC PNL TOTAL CA: CPT

## 2023-11-15 PROCEDURE — G0463: CPT

## 2023-11-15 PROCEDURE — 87086 URINE CULTURE/COLONY COUNT: CPT

## 2023-11-15 RX ORDER — LIDOCAINE HCL 20 MG/ML
0.2 VIAL (ML) INJECTION ONCE
Refills: 0 | Status: DISCONTINUED | OUTPATIENT
Start: 2023-11-29 | End: 2023-12-13

## 2023-11-15 RX ORDER — SODIUM CHLORIDE 9 MG/ML
1000 INJECTION, SOLUTION INTRAVENOUS
Refills: 0 | Status: DISCONTINUED | OUTPATIENT
Start: 2023-11-29 | End: 2023-12-13

## 2023-11-15 NOTE — H&P PST ADULT - ATTENDING COMMENTS
Patient with left ureteral obstruction and previous bladder cancer. Patient wishes to proceed with left stent exchange. Patient declined ureteral reimplant and requested serial stent changes. Informed consent was obtained. Alternatives were given. Risks including but not limited to bleeding, infection, risk of anesthesia, pain, failure to cure, stone migration, need for future procedure, and injury to surrounding structures were discussed. Patient wishes to proceed with procedure.

## 2023-11-15 NOTE — H&P PST ADULT - NSICDXPASTSURGICALHX_GEN_ALL_CORE_FT
PAST SURGICAL HISTORY:  H/O hand surgery right    H/O right nephrectomy 2017    History of bilateral cataract extraction     History of prostate surgery laser with cystoscopy    S/P ureteral stent placement many times - q6 month exchange     PAST SURGICAL HISTORY:  H/O hand surgery right    H/O right nephrectomy 2017    History of bilateral cataract extraction     History of cystoscopy     History of prostate surgery laser with cystoscopy    S/P bilateral inguinal hernia repair     S/P cystoscopy with ureteral stent placement     S/P ureteral stent placement many times - q6 month exchange

## 2023-11-15 NOTE — H&P PST ADULT - HISTORY OF PRESENT ILLNESS
73M PMH HTN, current smoker ("mild emphysema" detected on CT 2020 & 2023), right urothelial carcinoma s/p right nephroureterectomy, bladder cancer, left ureteral stent change for ureteral obstruction every ~6 months c/o inguinal swelling 2/2 bilateral inguinal hernia here for PsT for scheduled robotic assisted laparoscopic bilateral inguinal hernia repair with mesh 5/2023  This patient denies any fever, cough, sob, flu like symptoms or travel outside of the [place] in the past 30 days    73M PMH HTN, current smoker ("mild emphysema" detected on CT 2020 & 2023), history of right urothelial carcinoma s/p right nephroureterectomy, bladder cancer, left ureteral stent change for ureteral obstruction every ~6 months ( last 4/19/2023, presents to PST for scheduled routine left ureteral stent exchange on 11/29/2023. Patient reports having bilateral inguinal hernia repair 6/9/2023 without complications. Denies UTI symptoms, no fever , chills, no acute complaints. Ambulating without assistance.

## 2023-11-15 NOTE — H&P PST ADULT - ASSESSMENT
Airway : no airway abnormalities , denies prior anesthesia complications   Mallampati :  Denies loose teeth / Dentures upper /lower /partial     Parish abrasion risk : Denies  Airway : no airway abnormalities , denies prior anesthesia complications   Mallampati : I  Upper and lower dentures     Parish abrasion risk : Denies

## 2023-11-15 NOTE — H&P PST ADULT - SKIN/BREAST COMMENTS
under eye redness and dryness ( patient reports scratching ) likely stye- going to see PCP on Friday

## 2023-11-15 NOTE — H&P PST ADULT - NSALCOHOLAMT_GEN_A_CORE_SD
SUBJECTIVE:  Calista Shook is a 4 month old female who presents for 4 month old well child exam.  Patient presents with Mother.    Concerns raised today include: none     Feeding: breast is adequate.  - 8 feedings a day  (3 oz. Bottles of pumped breast milk every 4 hours)  Sleeping: No sleep or behavioral concerns.  Elimination:  Normal wet diapers and bowel movements.    SOCIAL:  Primary caretakers: mother and grandmother  Support at home:  Yes  Smoke exposure: none    DEVELOPMENT:  holds head up to 90 degrees, laughs, follows past midline and no persistant fist clenching   Pushes up to elbows  [x]  YES    []  NO     Good head control  [x]  YES    []  NO     Symmetry in movements  [x]  YES    []  NO     Begins to roll, reach for objects  [x]  YES    []  NO     Responds to affection  [x]  YES    []  NO     Indicates pleasure, displeasure  [x]  YES    []  NO     Spontaneous expressive babbling  [x]  YES    []  NO     Social Smile  [x]  YES    []  NO     Elicits social interactions  [x]  YES    []  NO     Can calm down on own     Birth history, medical history, surgical history, and family history reviewed and updated.    ROS:  Negative except for above mentioned concerns.    PHYSICAL EXAM:  Vitals:    10/26/20 1526   Weight: 6.776 kg (14 lb 15 oz)   Height: 25.9\" (65.8 cm)   HC: 41.3 cm (16.25\")         There were no vitals taken for this visit.    Growth parameters & Vital Signs are noted and are appropriate for age. For specifics, refer to the patient's Growth Chart.    Physical Exam   Constitutional: Well-developed and well-nourished, active.         Not ill-appearing, in no distress.   HENT:   Head:  Normocephalic without obvious abnormality  Eyes: Conjunctivae normal, EOMs intact. Pupils are equal, round, and reactive to light.   Ears:   External ears and canals normal  Right Ear: Tympanic membrane normal. Left Ear: Tympanic membrane normal.   Nose: Normal without erythema, edema or drainage  Mouth/Throat:  Mucous membranes are moist. No oral lesions.        No erythema or exudates   Neck: Normal range of motion. Neck supple, no masses or adenopathy  Cardiovascular: Normal rate, regular rhythm, S1 normal and S2 normal.  No murmur heard.  Pulmonary/Chest: Effort normal and breath sounds normal. No stridor.          No wheezes. No rhonchi or rales. No retraction.   Abdominal:  No distension, soft, non-tender; no palpable mass;       No  hepatosplenomegaly. There is no guarding or tenderness. No hernia  Musculoskeletal: Normal muscle mass, Full ROM,  no evidence of joint instability, no edema or deformities   Spine:  No deformities, no evidence of scoliosis  Lymphadenopathy: No cervical adenopathy  Neurological: Alert, oriented x3, normal gait, no motor-sensory deficits  Skin: Skin is warm. Capillary refill takes less than 2 seconds. No rash noted.   Gu:  Normal FEMALE; HUGH I      ASSESSMENT:  4 month old female well child.  Assessment   Problem List Items Addressed This Visit     None      Visit Diagnoses     Encounter for routine child health examination without abnormal findings    -  Primary    Need for vaccination        Relevant Orders    DTAP HEPB IPV COMBINED VACCINE IM (PEDIARIX) (Completed)    HIB VACC,PRP-OMP,IM(PEDVAXHIB) (Completed)    PNEUMOCOCCAL CONJUGATE 13 VALENT VACC(PREVNAR-13) (Completed)    ROTAVIRUS PENTAVALENT VACC 3 DOSE(ROTATEQ) (Completed)          Screening tests  Developmental milestones were reviewed and were: normal based on age    Counseling  Nutrition/Weight Management:  Assessment and discussion of current Nutrition behaviors performed:  Yes  Assessment and discussion of current Physical Activity behaviors performed: Yes      PLAN:   All parental concerns and questions discussed.    Counseling  Nutrition/Weight Management:  Assessment and discussion of current Nutrition behaviors performed:  Yes  Assessment and discussion of current Physical Activity behaviors performed:  Yes    Immunizations: per orders.  History of previous adverse reactions to immunizations? no  Immunizations given today: Yes - Immunizations given today and vaccine counseling including benefits, risks, and adverse reactions were provided by myself during the visit.   Immunizations per orders.    Orders Placed This Encounter   • DTAP HEPB IPV COMBINED VACCINE IM (PEDIARIX) IMM12   • HiB PRP-OMP 3 DOSE IM (PEDVAX) IMM41   • PNEUMOCOCCAL CONJUGATE 13 VALENT VACC(PREVNAR-13) IMM78   • ROTAVIRUS PENTAVALENT VACC 3 DOSE(ROTATEQ) IMM57       Discussed with parent/guardian the most common risks of the immunizations given today, potential side effects and the benefits provided by the immunizations.  All questions addressed with parents.  Current VIS given to parent/guardian at visit and verbal parent/guardian consent for immunizations received at today's visit.    Anticipatory guidance provided, handout given.              Development              Accident prevention: scalding, falls, small toys, smothering              No bottle in bed              Analgesics/antipyretics              Sun exposure                 Return to office for 6 month well child examination or sooner as needed for any illness/concerns.    Gaurang Aguilar MD                 1-2 drinks

## 2023-11-15 NOTE — H&P PST ADULT - NSICDXPASTMEDICALHX_GEN_ALL_CORE_FT
PAST MEDICAL HISTORY:  Arthritis     Bladder cancer x 20 yrs    Current every day smoker     Emphysema lung diagnosed ct scan 2020    Enlarged prostate BPH    H/O eye surgery     History of anemia     HTN (hypertension)     Left inguinal hernia     Right inguinal hernia     Ureter cancer, right 2017 no chemo     PAST MEDICAL HISTORY:  Arthritis     Bladder cancer x 20 yrs    Current every day smoker     Emphysema lung diagnosed ct scan 2020    Enlarged prostate BPH    H/O eye surgery     History of anemia     HTN (hypertension)     Retained ureteral stent     Ureter cancer, right 2017 no chemo

## 2023-11-15 NOTE — H&P PST ADULT - FUNCTIONAL STATUS
4-10 METS DASI 5.07 able to walk daily  for few miles , house work , independent, Denies symptoms/4-10 METS

## 2023-11-15 NOTE — H&P PST ADULT - PROBLEM SELECTOR PLAN 1
left stent exchange  PST instructions provided, patient verbalized understanding   CBC, BMP, urine cx collected and sent   scheduled for PCP evaluation on 11/17/2023, form provided, will be in [facility]  will obtain recent echo report.

## 2023-11-15 NOTE — H&P PST ADULT - PRIMARY CARE PROVIDER
Dr. Star Adler [phone] physical exam 4/11/23  has preop visit 11/17/2023 Dr. Star Adler [phone]  has preop visit 11/17/2023 form provided

## 2023-11-16 LAB
CULTURE RESULTS: SIGNIFICANT CHANGE UP
CULTURE RESULTS: SIGNIFICANT CHANGE UP
SPECIMEN SOURCE: SIGNIFICANT CHANGE UP
SPECIMEN SOURCE: SIGNIFICANT CHANGE UP

## 2023-11-28 ENCOUNTER — TRANSCRIPTION ENCOUNTER (OUTPATIENT)
Age: 73
End: 2023-11-28

## 2023-11-29 ENCOUNTER — RESULT REVIEW (OUTPATIENT)
Age: 73
End: 2023-11-29

## 2023-11-29 ENCOUNTER — APPOINTMENT (OUTPATIENT)
Dept: UROLOGY | Facility: HOSPITAL | Age: 73
End: 2023-11-29

## 2023-11-29 ENCOUNTER — TRANSCRIPTION ENCOUNTER (OUTPATIENT)
Age: 73
End: 2023-11-29

## 2023-11-29 ENCOUNTER — OUTPATIENT (OUTPATIENT)
Dept: OUTPATIENT SERVICES | Facility: HOSPITAL | Age: 73
LOS: 1 days | End: 2023-11-29
Payer: MEDICARE

## 2023-11-29 VITALS
HEART RATE: 81 BPM | RESPIRATION RATE: 15 BRPM | WEIGHT: 130.07 LBS | HEIGHT: 64 IN | OXYGEN SATURATION: 97 % | SYSTOLIC BLOOD PRESSURE: 144 MMHG | DIASTOLIC BLOOD PRESSURE: 54 MMHG | TEMPERATURE: 98 F

## 2023-11-29 VITALS
DIASTOLIC BLOOD PRESSURE: 72 MMHG | SYSTOLIC BLOOD PRESSURE: 153 MMHG | HEART RATE: 76 BPM | OXYGEN SATURATION: 98 % | RESPIRATION RATE: 16 BRPM

## 2023-11-29 DIAGNOSIS — Z98.890 OTHER SPECIFIED POSTPROCEDURAL STATES: Chronic | ICD-10-CM

## 2023-11-29 DIAGNOSIS — C67.9 MALIGNANT NEOPLASM OF BLADDER, UNSPECIFIED: ICD-10-CM

## 2023-11-29 DIAGNOSIS — Z96.0 PRESENCE OF UROGENITAL IMPLANTS: Chronic | ICD-10-CM

## 2023-11-29 DIAGNOSIS — Z90.5 ACQUIRED ABSENCE OF KIDNEY: Chronic | ICD-10-CM

## 2023-11-29 DIAGNOSIS — Z98.41 CATARACT EXTRACTION STATUS, RIGHT EYE: Chronic | ICD-10-CM

## 2023-11-29 PROCEDURE — 74420 UROGRAPHY RTRGR +-KUB: CPT | Mod: 26

## 2023-11-29 PROCEDURE — C2617: CPT

## 2023-11-29 PROCEDURE — 52332 CYSTOSCOPY AND TREATMENT: CPT | Mod: LT

## 2023-11-29 PROCEDURE — 88112 CYTOPATH CELL ENHANCE TECH: CPT | Mod: 26

## 2023-11-29 PROCEDURE — 88112 CYTOPATH CELL ENHANCE TECH: CPT

## 2023-11-29 PROCEDURE — 76000 FLUOROSCOPY <1 HR PHYS/QHP: CPT

## 2023-11-29 PROCEDURE — 36415 COLL VENOUS BLD VENIPUNCTURE: CPT

## 2023-11-29 PROCEDURE — C1758: CPT

## 2023-11-29 PROCEDURE — C1769: CPT

## 2023-11-29 DEVICE — IMPLANTABLE DEVICE: Type: IMPLANTABLE DEVICE | Status: FUNCTIONAL

## 2023-11-29 DEVICE — STENT URET INLAY OPTIMA 6FRX26CM: Type: IMPLANTABLE DEVICE | Status: FUNCTIONAL

## 2023-11-29 DEVICE — URETERAL CATH OPEN END 5FR 70CM: Type: IMPLANTABLE DEVICE | Status: FUNCTIONAL

## 2023-11-29 DEVICE — GUIDEWIRE SENSOR DUAL-FLEX NITINOL STRAIGHT .035" X 150CM: Type: IMPLANTABLE DEVICE | Status: FUNCTIONAL

## 2023-11-29 RX ORDER — ACETAMINOPHEN 500 MG
1000 TABLET ORAL EVERY 6 HOURS
Refills: 0 | Status: DISCONTINUED | OUTPATIENT
Start: 2023-11-29 | End: 2023-12-13

## 2023-11-29 RX ORDER — CEFAZOLIN SODIUM 1 G
2000 VIAL (EA) INJECTION ONCE
Refills: 0 | Status: COMPLETED | OUTPATIENT
Start: 2023-11-29 | End: 2023-11-29

## 2023-11-29 RX ORDER — SODIUM CHLORIDE 9 MG/ML
1000 INJECTION, SOLUTION INTRAVENOUS
Refills: 0 | Status: DISCONTINUED | OUTPATIENT
Start: 2023-11-29 | End: 2023-12-13

## 2023-11-29 RX ORDER — ACETAMINOPHEN 500 MG
1000 TABLET ORAL ONCE
Refills: 0 | Status: DISCONTINUED | OUTPATIENT
Start: 2023-11-29 | End: 2023-12-13

## 2023-11-29 RX ADMIN — SODIUM CHLORIDE 100 MILLILITER(S): 9 INJECTION, SOLUTION INTRAVENOUS at 10:37

## 2023-11-29 NOTE — ASU PATIENT PROFILE, ADULT - VISION (WITH CORRECTIVE LENSES IF THE PATIENT USUALLY WEARS THEM):
Partially impaired: cannot see medication labels or newsprint, but can see obstacles in path, and the surrounding layout; can count fingers at arm's length right eye titanium plate/Partially impaired: cannot see medication labels or newsprint, but can see obstacles in path, and the surrounding layout; can count fingers at arm's length

## 2023-11-29 NOTE — ASU DISCHARGE PLAN (ADULT/PEDIATRIC) - ASU DC SPECIAL INSTRUCTIONSFT
syncope Discharge Instructions: stent exchange    · Stent: You have an internal stent (a hollow tube that runs from the kidney to your bladder) after your procedure, which helps urine drain from the kidney to your bladder. Some patients experience urinary frequency, burning, or even back pain (especially with urination). These sensations will gradually get better. Increasing your fluid intake can also improve these symptoms. While the stent is in place, your urine may continue to be bloody. This stent is temporary and must be removed or exchanged by your urologist as an outpatient within 3 months unless otherwise specified.    · General: It is common to have blood in the urine after your procedure. It may be pink or even red; inform your doctor if you have a significant amount of clot in the urine or if you are unable to void at all. The urine may clear and then become bloody again especially as you are more physically active.    · Bathing: You may shower or bathe.    · Diet: You may resume your regular diet and regular medication regimen.    · Pain: You may take Tylenol (acetaminophen) 650-975mg and/or Motrin (ibuprofen) 400-600mg, available over the counter, for pain every 6 hours as needed. Do not exceed 4000 milligrams of Tylenol (acetaminophen) daily. You may alternate these medications such that you take either one every 3 hours.    · Antibiotics: You have been given a prescription for an antibiotic, please take this medication as instructed and be sure to complete entire course.    · Stool softeners: Do not allow yourself to become constipated as this may increase your bother from the stent and/or straining may cause bleeding. Take stool softeners (ex. Colace) or a laxative (ex. Senekot, ExLax), available over the counter, if needed.    · Activity: No heavy lifting or strenuous exercise until you are evaluated at your post-operative appointment. Otherwise, you may return to your usual level of activity.    · Anticoagulation: If you are taking any blood thinning medications, please discuss with your urologist prior to restarting these medications unless otherwise specified.    · Follow-up: If you did not already schedule your post-operative appointment, please call your urologist to schedule a follow-up appointment.    · Call your urologist if: You have any bleeding that does not stop, inability to void >8 hours, fever over 100.4 F, chills, persistent nausea/vomiting, or if your pain is not controlled on your discharge pain medications.

## 2023-11-29 NOTE — ASU PREOP CHECKLIST - NS PREOP CHK MONITOR ANESTHESIA CONSENT
Spoke with Dr. Lora Leal who explained that she requested patient be seen today given urgent referral as well as full clinic schedule tomorrow, so that she may have more time to d/w family. She is also reaching out to other colleagues for second opinion as far as treatment recommendations. Also informed that pt has been seen at this clinic previously for treatment of iron deficiency anemia, and has not been seen in clinic since May, with 4 no shows as well. Will reach out to Dr. Lila Harper for additional information on previous colleagues he's reached out to for Dr. Lora Leal in her research. Returned call to mother after speaking with Dr. Lora Leal. Mother not available at the moment, but they are at [facility] for appt. Advised to have her call PRN. done

## 2023-11-29 NOTE — ASU PATIENT PROFILE, ADULT - FALL HARM RISK - RISK INTERVENTIONS

## 2023-11-29 NOTE — ASU PATIENT PROFILE, ADULT - NSTOBACCO QUIT READY_GEN_A_CORE_SD
Health Maintenance Due   Topic Date Due   • Diabetes Eye Exam  Never done   • DM/CKD Microalbumin  Never done   • Diabetes Foot Exam  Never done   • Shingles Vaccine (1 of 2) Never done   • DTaP/Tdap/Td Vaccine (1 - Tdap) 07/24/2008   • Colonoscopy Risk  07/15/2019   • Influenza Vaccine (1) 09/01/2021   • Diabetes A1C  11/04/2021   • Medicare Wellness Visit  01/21/2022       Patient is due for the topics as listed above and diabetic follow up due in November.  Td/tdap not covered by insurance.  Has been informed about Shingrix.    Would like flu vaccine today.  Declined colonoscopy.      Uses clinic pharmacy for short term   not motivated to quit

## 2023-11-29 NOTE — ASU DISCHARGE PLAN (ADULT/PEDIATRIC) - CARE PROVIDER_API CALL
Paul Smith  [facility]  Urology  [address] [place] [address], [address]  [place] [address]  Phone: [phone]  Fax: [phone]  Follow Up Time: 1 week

## 2023-11-29 NOTE — PRE-ANESTHESIA EVALUATION ADULT - LAST STRESS TEST
----- Message from Isacc Castro MD sent at 7/12/2021 11:01 AM CDT -----  Please notify- not immune to VZ. Thanks.   Denies

## 2023-11-30 ENCOUNTER — APPOINTMENT (OUTPATIENT)
Dept: UROLOGY | Facility: CLINIC | Age: 73
End: 2023-11-30

## 2023-12-04 LAB
NON-GYNECOLOGICAL CYTOLOGY STUDY: SIGNIFICANT CHANGE UP
NON-GYNECOLOGICAL CYTOLOGY STUDY: SIGNIFICANT CHANGE UP

## 2024-01-24 NOTE — ASU PREOP CHECKLIST - SURGICAL CONSENT
I have left hip to left leg pain for almost 2 weeks now. I was here last week and was told Sciatica. I took Tylenol at 1 AM"
done

## 2024-03-06 PROBLEM — Z96.0 PRESENCE OF UROGENITAL IMPLANTS: Chronic | Status: ACTIVE | Noted: 2023-11-15

## 2024-04-10 NOTE — ASU PATIENT PROFILE, ADULT - NSCAFFEAMTFREQ_GEN_ALL_CORE_SD
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 
3-4 cups/cans per day

## 2024-04-11 ENCOUNTER — APPOINTMENT (OUTPATIENT)
Dept: UROLOGY | Facility: CLINIC | Age: 74
End: 2024-04-11
Payer: MEDICARE

## 2024-04-11 DIAGNOSIS — N40.1 BENIGN PROSTATIC HYPERPLASIA WITH LOWER URINARY TRACT SYMPMS: ICD-10-CM

## 2024-04-11 DIAGNOSIS — C67.9 MALIGNANT NEOPLASM OF BLADDER, UNSPECIFIED: ICD-10-CM

## 2024-04-11 DIAGNOSIS — N13.30 UNSPECIFIED HYDRONEPHROSIS: ICD-10-CM

## 2024-04-11 DIAGNOSIS — N13.8 BENIGN PROSTATIC HYPERPLASIA WITH LOWER URINARY TRACT SYMPMS: ICD-10-CM

## 2024-04-11 PROCEDURE — 99214 OFFICE O/P EST MOD 30 MIN: CPT

## 2024-04-11 PROCEDURE — G2211 COMPLEX E/M VISIT ADD ON: CPT

## 2024-04-11 RX ORDER — FINASTERIDE 5 MG/1
5 TABLET, FILM COATED ORAL
Qty: 90 | Refills: 3 | Status: ACTIVE | COMMUNITY
Start: 2022-10-06 | End: 1900-01-01

## 2024-04-12 LAB
ANION GAP SERPL CALC-SCNC: 14 MMOL/L
BUN SERPL-MCNC: 45 MG/DL
CALCIUM SERPL-MCNC: 8.3 MG/DL
CHLORIDE SERPL-SCNC: 101 MMOL/L
CO2 SERPL-SCNC: 25 MMOL/L
CREAT SERPL-MCNC: 2.09 MG/DL
EGFR: 32 ML/MIN/1.73M2
GLUCOSE SERPL-MCNC: 84 MG/DL
HCT VFR BLD CALC: 38.5 %
HGB BLD-MCNC: 12.2 G/DL
MCHC RBC-ENTMCNC: 28.9 PG
MCHC RBC-ENTMCNC: 31.7 GM/DL
MCV RBC AUTO: 91.2 FL
PLATELET # BLD AUTO: 281 K/UL
POTASSIUM SERPL-SCNC: 5.3 MMOL/L
PSA FREE FLD-MCNC: 29 %
PSA FREE SERPL-MCNC: 0.24 NG/ML
PSA SERPL-MCNC: 0.85 NG/ML
RBC # BLD: 4.22 M/UL
RBC # FLD: 14.1 %
SODIUM SERPL-SCNC: 140 MMOL/L
URINE CYTOLOGY: NORMAL
WBC # FLD AUTO: 7.11 K/UL

## 2024-04-13 LAB — BACTERIA UR CULT: NORMAL

## 2024-04-21 NOTE — ADDENDUM
[FreeTextEntry1] : Entered by Ashu Nichole, acting as scribe for Dr. Pual Smith. The documentation recorded by the scribe accurately reflects the service I personally performed and the decisions made by me.

## 2024-04-21 NOTE — LETTER BODY
[FreeTextEntry1] : Kalin OBREGON Leventhal DO  [address] [place] [address]  [phone]    Dear Dr. Leventhal,    Reason for visit: Bladder cancer   Left ureteral obstruction managed with chronic stent exchanges..    This is a 74 year old gentleman with previous right urothelial carcinoma s/p right nephroureterectomy and recurrent bladder cancer s/p TURBT presenting with left ureteral obstruction. The patient previously experienced a diffuse pruritic rash on arms and legs with Mitomycin bladder instillation. His previous cystoscopy was unremarkable. Since he was last seen, the patient reports doing well. He has no urinary complaints or difficulties. The patient is overall well. He denies any changes in health. The past medical history and family history and social history are unchanged. All other review of systems are negative. Patient denies any changes in medications. Medication list was reconciled.    On examination, the patient is an older-appearing gentleman in no acute distress. He is alert and oriented follows commands. He has normal mood and affect. He is normocephalic. Neck is supple. Oral no thrush Respirations are unlabored. His abdomen is soft and nontender. Bladder is nonpalpable. No CVA tenderness. Neurologically he is grossly intact. No peripheral edema. Skin without gross abnormality. He has normal male external genitalia. Normal meatus. Bilateral testes are descended intrascrotally and normal to palpation. Patient has an inguinal hernia.    Assessment: Bladder cancer, s/p TURBT. Left ureteral obstruction. Inguinal hernia    I counseled the patient. Patient will undergo cystoscopy to evaluate his urinary outlet for bladder cancer. Patient will proceed with left stent exchange for his left ureteral obstruction. I counseled the patient regarding the procedure. The risks and benefits were discussed. Alternatives were given. I answered the patient questions. The patient will take the necessary preparations for the procedure, including activated partial thromboplastin time, BMP, CBC w/ DIFF, culture, prothrombin Time and INR. He will repeat urinalysis and urine cytology to look for high grade urothelial carcinoma. He will repeat PSA and BMP to establish baseline. Risks and alternatives were discussed. I answered the patient questions. The patient will follow-up as directed and will contact me with any questions or concerns. Thank you for the opportunity to participate in the care of Mr. AHMADI. I will keep you updated on his progress.   Patient will continue longitudinal care for his complex and serious chronic condition.   Plan: Left stent exchange. Preop labs. Cystoscopy. PSA. BMP. Urinalysis. Cytology. Follow up as directed.  I spent 30-minutes time today on all issues related to this patient on today date of service including non face to face time.

## 2024-04-21 NOTE — HISTORY OF PRESENT ILLNESS
[FreeTextEntry1] : Follow-up bladder cancer.  Cystoscopy.  Follow-up left ureteral obstruction.  Left stent exchange.  Preop labs.  PSA.  Urine cytology.  Please refer to URO Consult note

## 2024-05-01 ENCOUNTER — OUTPATIENT (OUTPATIENT)
Dept: OUTPATIENT SERVICES | Facility: HOSPITAL | Age: 74
LOS: 1 days | End: 2024-05-01
Payer: MEDICARE

## 2024-05-01 VITALS
HEIGHT: 64 IN | DIASTOLIC BLOOD PRESSURE: 70 MMHG | SYSTOLIC BLOOD PRESSURE: 150 MMHG | OXYGEN SATURATION: 98 % | HEART RATE: 86 BPM | WEIGHT: 134.04 LBS | RESPIRATION RATE: 16 BRPM | TEMPERATURE: 98 F

## 2024-05-01 DIAGNOSIS — Z98.890 OTHER SPECIFIED POSTPROCEDURAL STATES: Chronic | ICD-10-CM

## 2024-05-01 DIAGNOSIS — Z90.5 ACQUIRED ABSENCE OF KIDNEY: Chronic | ICD-10-CM

## 2024-05-01 DIAGNOSIS — C67.9 MALIGNANT NEOPLASM OF BLADDER, UNSPECIFIED: ICD-10-CM

## 2024-05-01 DIAGNOSIS — Z98.41 CATARACT EXTRACTION STATUS, RIGHT EYE: Chronic | ICD-10-CM

## 2024-05-01 DIAGNOSIS — N13.8 OTHER OBSTRUCTIVE AND REFLUX UROPATHY: ICD-10-CM

## 2024-05-01 DIAGNOSIS — Z01.818 ENCOUNTER FOR OTHER PREPROCEDURAL EXAMINATION: ICD-10-CM

## 2024-05-01 DIAGNOSIS — Z96.0 PRESENCE OF UROGENITAL IMPLANTS: Chronic | ICD-10-CM

## 2024-05-01 DIAGNOSIS — Z87.898 PERSONAL HISTORY OF OTHER SPECIFIED CONDITIONS: ICD-10-CM

## 2024-05-01 DIAGNOSIS — N40.1 BENIGN PROSTATIC HYPERPLASIA WITH LOWER URINARY TRACT SYMPTOMS: ICD-10-CM

## 2024-05-01 LAB
A1C WITH ESTIMATED AVERAGE GLUCOSE RESULT: 6.7 % — HIGH (ref 4–5.6)
ANION GAP SERPL CALC-SCNC: 16 MMOL/L — SIGNIFICANT CHANGE UP (ref 5–17)
BUN SERPL-MCNC: 58 MG/DL — HIGH (ref 7–23)
CALCIUM SERPL-MCNC: 8.1 MG/DL — LOW (ref 8.4–10.5)
CHLORIDE SERPL-SCNC: 103 MMOL/L — SIGNIFICANT CHANGE UP (ref 96–108)
CO2 SERPL-SCNC: 22 MMOL/L — SIGNIFICANT CHANGE UP (ref 22–31)
CREAT SERPL-MCNC: 2.09 MG/DL — HIGH (ref 0.5–1.3)
EGFR: 32 ML/MIN/1.73M2 — LOW
ESTIMATED AVERAGE GLUCOSE: 146 MG/DL — HIGH (ref 68–114)
GLUCOSE SERPL-MCNC: 132 MG/DL — HIGH (ref 70–99)
HCT VFR BLD CALC: 41.2 % — SIGNIFICANT CHANGE UP (ref 39–50)
HGB BLD-MCNC: 12.6 G/DL — LOW (ref 13–17)
MCHC RBC-ENTMCNC: 28 PG — SIGNIFICANT CHANGE UP (ref 27–34)
MCHC RBC-ENTMCNC: 30.6 GM/DL — LOW (ref 32–36)
MCV RBC AUTO: 91.6 FL — SIGNIFICANT CHANGE UP (ref 80–100)
NRBC # BLD: 0 /100 WBCS — SIGNIFICANT CHANGE UP (ref 0–0)
PLATELET # BLD AUTO: 306 K/UL — SIGNIFICANT CHANGE UP (ref 150–400)
POTASSIUM SERPL-MCNC: 4.3 MMOL/L — SIGNIFICANT CHANGE UP (ref 3.5–5.3)
POTASSIUM SERPL-SCNC: 4.3 MMOL/L — SIGNIFICANT CHANGE UP (ref 3.5–5.3)
RBC # BLD: 4.5 M/UL — SIGNIFICANT CHANGE UP (ref 4.2–5.8)
RBC # FLD: 14 % — SIGNIFICANT CHANGE UP (ref 10.3–14.5)
SODIUM SERPL-SCNC: 141 MMOL/L — SIGNIFICANT CHANGE UP (ref 135–145)
WBC # BLD: 8.3 K/UL — SIGNIFICANT CHANGE UP (ref 3.8–10.5)
WBC # FLD AUTO: 8.3 K/UL — SIGNIFICANT CHANGE UP (ref 3.8–10.5)

## 2024-05-01 PROCEDURE — G0463: CPT

## 2024-05-01 PROCEDURE — 80048 BASIC METABOLIC PNL TOTAL CA: CPT

## 2024-05-01 PROCEDURE — 85027 COMPLETE CBC AUTOMATED: CPT

## 2024-05-01 PROCEDURE — 36415 COLL VENOUS BLD VENIPUNCTURE: CPT

## 2024-05-01 PROCEDURE — 83036 HEMOGLOBIN GLYCOSYLATED A1C: CPT

## 2024-05-01 PROCEDURE — 87086 URINE CULTURE/COLONY COUNT: CPT

## 2024-05-01 RX ORDER — SODIUM CHLORIDE 9 MG/ML
1000 INJECTION, SOLUTION INTRAVENOUS
Refills: 0 | Status: DISCONTINUED | OUTPATIENT
Start: 2024-05-22 | End: 2024-06-05

## 2024-05-01 NOTE — H&P PST ADULT - HISTORY OF PRESENT ILLNESS
74 year old male with PMH HTN, current smoker (with "mild emphysema" detected on CT in 2020 and 2023), right urothelial carcinoma s/p right nephroureterectomy and bladder CA s/p TURBT requires a left ureteral stent exchange every 6 months for ureteral obstruction with his last exchange in November 2023. Pt tolerated procedure well. He denies any hematuria, dysuria, flank pain, fever or chills. Pt now presents to PST for scheduled cystoscopy and left stent exchange with Dr. Smith on 5/22/24 at the ambulatory care center. 74 year old male with PMH HTN, current smoker (with "mild emphysema" detected on CT in 2020 and 2023 - no supplemental O2 - 0.75 PPD x 60 years), benign essential tremor (right hand - as per pt, etiology unknown), right urothelial carcinoma s/p right nephroureterectomy (~2019) and bladder CA s/p TURBT (~25 years ago) requires a left ureteral stent exchange every 6 months for ureteral obstruction with his last exchange in November 2023. Pt tolerated procedure well. He denies any hematuria, dysuria, flank pain, fever or chills. Pt now presents to PST for scheduled cystoscopy and left stent exchange with Dr. Smith on 5/22/24 at the ambulatory care center. 74 year old male with PMH HTN, prediabetes, current smoker (with "mild emphysema" detected on CT in 2020 and 2023 - no supplemental O2 - 0.75 PPD x 60 years), benign essential tremor (right hand - as per pt, etiology unknown), right urothelial carcinoma s/p right nephroureterectomy (~2019) and bladder CA s/p TURBT (~25 years ago) requires a left ureteral stent exchange every 6 months for ureteral obstruction with his last exchange in November 2023. Pt tolerated procedure well. He denies any hematuria, dysuria, flank pain, fever or chills. Pt now presents to PST for scheduled cystoscopy and left stent exchange with Dr. Smith on 5/22/24 at the ambulatory care center.

## 2024-05-01 NOTE — H&P PST ADULT - ASSESSMENT
DASI score:  DASI activity: Able to walk 2-3 blocks, ADLs, Grocery Shopping, 1 Flight of Stairs   Loose teeth or denture: denies DASI score: 7.25  DASI activity: Able to walk 2-3 blocks, ADLs, Grocery Shopping, 1 Flight of Stairs, walking outdoors 2 miles daily, works part-time as a , mows the lawn  Loose teeth or denture: upper and lower complete dentures

## 2024-05-01 NOTE — H&P PST ADULT - ATTENDING COMMENTS
Patient with left ureteral obstruction. Patient wishes to proceed with left stent exchange, possible TURBT and left ureteroscopy, proceed as indicated. Informed consent was obtained. Alternatives were given. Risks including but not limited to bleeding, infection, risk of anesthesia, pain, failure to cure, , need for future procedure, and injury to surrounding structures were discussed. Patient wishes to proceed with procedure. Patient with left ureteral obstruction.  Patient wishes to proceed with left stent exchange, possible TURBT and left ureteroscopy, proceed as indicated. Informed consent was obtained. Alternatives were given. Risks including but not limited to bleeding, infection, risk of anesthesia, pain, failure to cure, , need for future procedure, and injury to surrounding structures were discussed. Patient wishes to proceed with procedure.

## 2024-05-01 NOTE — H&P PST ADULT - NSICDXPASTMEDICALHX_GEN_ALL_CORE_FT
PAST MEDICAL HISTORY:  Arthritis     Bladder cancer x 20 yrs    Current every day smoker     Emphysema lung diagnosed ct scan 2020    Enlarged prostate BPH    H/O eye surgery     History of anemia     HTN (hypertension)     Other obstructive and reflux uropathy     Retained ureteral stent     Ureter cancer, right 2017 no chemo     PAST MEDICAL HISTORY:  Arthritis     Bladder cancer x 20 yrs    Current every day smoker     Emphysema lung diagnosed ct scan 2020    Enlarged prostate BPH    H/O benign essential tremor     HTN (hypertension)     Other obstructive and reflux uropathy     Retained ureteral stent     Ureter cancer, right 2017 no chemo     PAST MEDICAL HISTORY:  Arthritis     Bladder cancer x 20 yrs    Current every day smoker     Emphysema lung diagnosed ct scan 2020    Enlarged prostate BPH    H/O benign essential tremor     History of prediabetes     HTN (hypertension)     Other obstructive and reflux uropathy     Retained ureteral stent     Ureter cancer, right 2017 no chemo

## 2024-05-01 NOTE — H&P PST ADULT - NEGATIVE GENERAL GENITOURINARY SYMPTOMS
no hematuria/no renal colic/no flank pain L/no flank pain R/no urine discoloration/no bladder infections/no incontinence/no dysuria/no urinary hesitancy/normal urinary frequency no hematuria/no renal colic/no flank pain L/no flank pain R/no urine discoloration/no bladder infections/no incontinence/no dysuria/no urinary hesitancy

## 2024-05-01 NOTE — H&P PST ADULT - PROBLEM SELECTOR PLAN 1
Pt is scheduled for a left stent exchange with Dr. Smith on 5/22/24 at the ambulatory care center  CBC, BMP and urine culture ordered and obtained at PST

## 2024-05-01 NOTE — H&P PST ADULT - PRO TOBACCO QUIT ATTEMPTS
stopped nicotine replacement- "it didn't work"/nicotine replacement therapy stopped nicotine replacement- "it didn't work"/none/nicotine replacement therapy

## 2024-05-01 NOTE — H&P PST ADULT - NSICDXPASTSURGICALHX_GEN_ALL_CORE_FT
PAST SURGICAL HISTORY:  H/O hand surgery right    H/O right nephrectomy 2017    History of bilateral cataract extraction     History of cystoscopy     History of prostate surgery laser with cystoscopy    S/P bilateral inguinal hernia repair     S/P cystoscopy with ureteral stent placement     S/P ureteral stent placement many times - q6 month exchange     PAST SURGICAL HISTORY:  H/O hand surgery right    H/O right nephrectomy 2017    History of bilateral cataract extraction     History of cystoscopy     History of eye surgery     History of prostate surgery laser with cystoscopy    S/P bilateral inguinal hernia repair     S/P cystoscopy with ureteral stent placement     S/P ureteral stent placement many times - q6 month exchange

## 2024-05-02 LAB
CULTURE RESULTS: SIGNIFICANT CHANGE UP
SPECIMEN SOURCE: SIGNIFICANT CHANGE UP

## 2024-05-21 ENCOUNTER — TRANSCRIPTION ENCOUNTER (OUTPATIENT)
Age: 74
End: 2024-05-21

## 2024-05-22 ENCOUNTER — RESULT REVIEW (OUTPATIENT)
Age: 74
End: 2024-05-22

## 2024-05-22 ENCOUNTER — OUTPATIENT (OUTPATIENT)
Dept: OUTPATIENT SERVICES | Facility: HOSPITAL | Age: 74
LOS: 1 days | End: 2024-05-22
Payer: MEDICARE

## 2024-05-22 ENCOUNTER — TRANSCRIPTION ENCOUNTER (OUTPATIENT)
Age: 74
End: 2024-05-22

## 2024-05-22 ENCOUNTER — APPOINTMENT (OUTPATIENT)
Dept: UROLOGY | Facility: HOSPITAL | Age: 74
End: 2024-05-22

## 2024-05-22 VITALS
TEMPERATURE: 97 F | HEART RATE: 75 BPM | DIASTOLIC BLOOD PRESSURE: 56 MMHG | SYSTOLIC BLOOD PRESSURE: 119 MMHG | RESPIRATION RATE: 15 BRPM | OXYGEN SATURATION: 95 %

## 2024-05-22 VITALS
RESPIRATION RATE: 16 BRPM | WEIGHT: 134.04 LBS | SYSTOLIC BLOOD PRESSURE: 120 MMHG | TEMPERATURE: 97 F | HEIGHT: 64 IN | OXYGEN SATURATION: 97 % | HEART RATE: 72 BPM | DIASTOLIC BLOOD PRESSURE: 70 MMHG

## 2024-05-22 DIAGNOSIS — Z96.0 PRESENCE OF UROGENITAL IMPLANTS: Chronic | ICD-10-CM

## 2024-05-22 DIAGNOSIS — Z98.41 CATARACT EXTRACTION STATUS, RIGHT EYE: Chronic | ICD-10-CM

## 2024-05-22 DIAGNOSIS — Z90.5 ACQUIRED ABSENCE OF KIDNEY: Chronic | ICD-10-CM

## 2024-05-22 DIAGNOSIS — Z98.890 OTHER SPECIFIED POSTPROCEDURAL STATES: Chronic | ICD-10-CM

## 2024-05-22 DIAGNOSIS — N40.1 BENIGN PROSTATIC HYPERPLASIA WITH LOWER URINARY TRACT SYMPTOMS: ICD-10-CM

## 2024-05-22 DIAGNOSIS — N13.8 OTHER OBSTRUCTIVE AND REFLUX UROPATHY: ICD-10-CM

## 2024-05-22 DIAGNOSIS — C67.9 MALIGNANT NEOPLASM OF BLADDER, UNSPECIFIED: ICD-10-CM

## 2024-05-22 PROBLEM — Z87.898 PERSONAL HISTORY OF OTHER SPECIFIED CONDITIONS: Chronic | Status: ACTIVE | Noted: 2024-05-01

## 2024-05-22 PROBLEM — Z86.69 PERSONAL HISTORY OF OTHER DISEASES OF THE NERVOUS SYSTEM AND SENSE ORGANS: Chronic | Status: ACTIVE | Noted: 2024-05-01

## 2024-05-22 PROCEDURE — C1769: CPT

## 2024-05-22 PROCEDURE — 88112 CYTOPATH CELL ENHANCE TECH: CPT

## 2024-05-22 PROCEDURE — 74420 UROGRAPHY RTRGR +-KUB: CPT | Mod: 26

## 2024-05-22 PROCEDURE — C1758: CPT

## 2024-05-22 PROCEDURE — C2617: CPT

## 2024-05-22 PROCEDURE — 52351 CYSTOURETERO & OR PYELOSCOPE: CPT | Mod: LT

## 2024-05-22 PROCEDURE — 52332 CYSTOSCOPY AND TREATMENT: CPT | Mod: LT

## 2024-05-22 PROCEDURE — 88341 IMHCHEM/IMCYTCHM EA ADD ANTB: CPT | Mod: 26,59

## 2024-05-22 PROCEDURE — 88341 IMHCHEM/IMCYTCHM EA ADD ANTB: CPT

## 2024-05-22 PROCEDURE — 88342 IMHCHEM/IMCYTCHM 1ST ANTB: CPT | Mod: 26,59

## 2024-05-22 PROCEDURE — 88342 IMHCHEM/IMCYTCHM 1ST ANTB: CPT

## 2024-05-22 PROCEDURE — 76000 FLUOROSCOPY <1 HR PHYS/QHP: CPT

## 2024-05-22 PROCEDURE — 52332 CYSTOSCOPY AND TREATMENT: CPT | Mod: LT,XS

## 2024-05-22 PROCEDURE — 88112 CYTOPATH CELL ENHANCE TECH: CPT | Mod: 26

## 2024-05-22 PROCEDURE — 52235 CYSTOSCOPY AND TREATMENT: CPT

## 2024-05-22 PROCEDURE — 88305 TISSUE EXAM BY PATHOLOGIST: CPT

## 2024-05-22 PROCEDURE — 52235 CYSTOSCOPY AND TREATMENT: CPT | Mod: 59

## 2024-05-22 PROCEDURE — 88360 TUMOR IMMUNOHISTOCHEM/MANUAL: CPT

## 2024-05-22 PROCEDURE — 88305 TISSUE EXAM BY PATHOLOGIST: CPT | Mod: 26

## 2024-05-22 PROCEDURE — 88360 TUMOR IMMUNOHISTOCHEM/MANUAL: CPT | Mod: 26

## 2024-05-22 DEVICE — GUIDEWIRE SENSOR DUAL-FLEX NITINOL STRAIGHT .035" X 150CM: Type: IMPLANTABLE DEVICE | Site: LEFT | Status: FUNCTIONAL

## 2024-05-22 DEVICE — IMPLANTABLE DEVICE: Type: IMPLANTABLE DEVICE | Site: LEFT | Status: FUNCTIONAL

## 2024-05-22 DEVICE — STENT URET INLAY OPTIMA 6FRX26CM: Type: IMPLANTABLE DEVICE | Site: LEFT | Status: FUNCTIONAL

## 2024-05-22 DEVICE — URETERAL CATH OPEN END 5FR 70CM: Type: IMPLANTABLE DEVICE | Site: LEFT | Status: FUNCTIONAL

## 2024-05-22 RX ORDER — CEPHALEXIN 500 MG
1 CAPSULE ORAL
Qty: 9 | Refills: 0
Start: 2024-05-22 | End: 2024-05-24

## 2024-05-22 RX ORDER — HYDROMORPHONE HYDROCHLORIDE 2 MG/ML
0.25 INJECTION INTRAMUSCULAR; INTRAVENOUS; SUBCUTANEOUS
Refills: 0 | Status: DISCONTINUED | OUTPATIENT
Start: 2024-05-22 | End: 2024-05-22

## 2024-05-22 RX ORDER — FINASTERIDE 5 MG/1
1 TABLET, FILM COATED ORAL
Qty: 0 | Refills: 0 | DISCHARGE

## 2024-05-22 RX ORDER — ONDANSETRON 8 MG/1
4 TABLET, FILM COATED ORAL ONCE
Refills: 0 | Status: DISCONTINUED | OUTPATIENT
Start: 2024-05-22 | End: 2024-06-05

## 2024-05-22 RX ORDER — AMLODIPINE BESYLATE 2.5 MG/1
1 TABLET ORAL
Qty: 0 | Refills: 0 | DISCHARGE

## 2024-05-22 RX ORDER — LIDOCAINE HCL 20 MG/ML
0.2 VIAL (ML) INJECTION ONCE
Refills: 0 | Status: COMPLETED | OUTPATIENT
Start: 2024-05-22 | End: 2024-05-22

## 2024-05-22 RX ORDER — CEFAZOLIN SODIUM 1 G
2000 VIAL (EA) INJECTION ONCE
Refills: 0 | Status: COMPLETED | OUTPATIENT
Start: 2024-05-22 | End: 2024-05-22

## 2024-05-22 RX ORDER — OXYCODONE HYDROCHLORIDE 5 MG/1
5 TABLET ORAL ONCE
Refills: 0 | Status: DISCONTINUED | OUTPATIENT
Start: 2024-05-22 | End: 2024-05-22

## 2024-05-22 RX ADMIN — SODIUM CHLORIDE 100 MILLILITER(S): 9 INJECTION, SOLUTION INTRAVENOUS at 07:46

## 2024-05-22 NOTE — ASU DISCHARGE PLAN (ADULT/PEDIATRIC) - ASU DC SPECIAL INSTRUCTIONSFT
STENT: You have internal stents (a hollow tube that runs from the kidney to your bladder) after your procedure, which helps urine drain from the kidney to your bladder. Some patients experience urinary frequency, burning, or even back pain (especially with urination). These sensations will gradually get better. Increasing your fluid intake can also improve these symptoms. While the stent is in place, your urine may continue to be bloody. This stent is temporary and must be removed by your urologist as an outpatient with in 3 months unless otherwise specified.  CATHETER: You are sent home with a reeves catheter the nurses will review instructions and care before you go home.  GENERAL: It is common to have blood in your urine after your procedure. It may be pink or even red; inform your doctor if you have a significant amount of clot in the urine or if you are unable to void at all or if your catheter stops draining. The urine may clear and then become bloody again especially as you are more physically active. It is not uncommon to have some burning when you urinate, this will gradually improve. With a catheter in place, it is not uncommon to have occasional leakage or urine or blood around the catheter. Please call your urologist if this is excessive and/or the urine is not draining through the catheter into the bag.  BATHING: You may shower or bathe. If going home with reeves, shower only until catheter is removed.  DIET: You may resume your regular diet and regular medication regimen.  PAIN: You may take Tylenol (acetaminophen) 650-975mg and/or Motrin (ibuprofen) 400-600mg, both available over the counter, for pain every 6 hours as needed. Do not exceed 4000mg of Tylenol (acetaminophen) daily. You may alternate these medications such that you take one or the other every 3 hours for around the clock pain coverage.  ANTIBIOTICS: You have been given a prescription for an antibiotic, please take this medication as instructed and be sure to complete the entire course.  STOOL SOFTENERS: Do not allow yourself to become constipated as straining may cause bleeding. Take stool softeners or a laxative (ex. Miralax, Colace, Senokot, ExLax, etc), available over the counter, if needed.  ACTIVITY: No heavy lifting or strenuous exercise until you are evaluated at your post-operative appointment. Otherwise, you may return to your usual level of physical activity.  FOLLOW-UP: If you did not already schedule your post-operative appointment, please call your urologist to schedule a follow-up appointment. Please follow-up with Dr. Smith tomorrow for catheter removal.   CALL YOUR UROLOGIST IF: You have any bleeding that does not stop, inability to void >8 hours, fever over 100.4 F, chills, persistent nausea/vomiting, changes in your incision concerning for infection, or if your pain is not controlled on your discharge pain medications. STENT: You have internal stents (a hollow tube that runs from the kidney to your bladder) after your procedure, which helps urine drain from the kidney to your bladder. Some patients experience urinary frequency, burning, or even back pain (especially with urination). These sensations will gradually get better. Increasing your fluid intake can also improve these symptoms. While the stent is in place, your urine may continue to be bloody. This stent is temporary and must be removed by your urologist as an outpatient with in 3 months unless otherwise specified.      ********************************************************************************************   CATHETER: You are sent home with a reeves catheter the nurses will review instructions and care before you go home.      ********************************************************************************************   GENERAL: It is common to have blood in your urine after your procedure. It may be pink or even red; inform your doctor if you have a significant amount of clot in the urine or if you are unable to void at all or if your catheter stops draining. The urine may clear and then become bloody again especially as you are more physically active. It is not uncommon to have some burning when you urinate, this will gradually improve. With a catheter in place, it is not uncommon to have occasional leakage or urine or blood around the catheter. Please call your urologist if this is excessive and/or the urine is not draining through the catheter into the bag.      ********************************************************************************************   BATHING: You may shower or bathe. If going home with reeves, shower only until catheter is removed.    ********************************************************************************************   DIET: You may resume your regular diet and regular medication regimen.    ********************************************************************************************   PAIN: You may take Tylenol (acetaminophen) 650-975mg and/or Motrin (ibuprofen) 400-600mg, both available over the counter, for pain every 6 hours as needed. Do not exceed 4000mg of Tylenol (acetaminophen) daily. You may alternate these medications such that you take one or the other every 3 hours for around the clock pain coverage.    ********************************************************************************************   ANTIBIOTICS: You have been given a prescription for an antibiotic, please take this medication as instructed and be sure to complete the entire course.    ********************************************************************************************   STOOL SOFTENERS: Do not allow yourself to become constipated as straining may cause bleeding. Take stool softeners or a laxative (ex. Miralax, Colace, Senokot, ExLax, etc), available over the counter, if needed.    ********************************************************************************************   ACTIVITY: No heavy lifting or strenuous exercise until you are evaluated at your post-operative appointment. Otherwise, you may return to your usual level of physical activity.    ********************************************************************************************   FOLLOW-UP: If you did not already schedule your post-operative appointment, please call your urologist to schedule a follow-up appointment. Please follow-up with Dr. Smith tomorrrubi for catheter removal.     ********************************************************************************************   CALL YOUR UROLOGIST IF: You have any bleeding that does not stop, inability to void >8 hours, fever over 100.4 F, chills, persistent nausea/vomiting, changes in your incision concerning for infection, or if your pain is not controlled on your discharge pain medications.

## 2024-05-22 NOTE — ASU DISCHARGE PLAN (ADULT/PEDIATRIC) - NS MD DC FALL RISK RISK
For information on Fall & Injury Prevention, visit: [email or web address] OR  [email or web address] OR  https://www.cdc.gov/steadi/patient.html

## 2024-05-22 NOTE — ASU PATIENT PROFILE, ADULT - PATIENT'S SEXUAL ORIENTATION
Heterosexual
ptwith gender dysmorphic disorder for tlh bso the risks and alternatives have been discussed.

## 2024-05-22 NOTE — ASU PATIENT PROFILE, ADULT - NSICDXPASTSURGICALHX_GEN_ALL_CORE_FT
PAST SURGICAL HISTORY:  H/O hand surgery right    H/O right nephrectomy 2017    History of bilateral cataract extraction     History of cystoscopy     History of eye surgery     History of prostate surgery laser with cystoscopy    S/P bilateral inguinal hernia repair     S/P cystoscopy with ureteral stent placement     S/P ureteral stent placement many times - q6 month exchange

## 2024-05-22 NOTE — BRIEF OPERATIVE NOTE - NSICDXBRIEFPROCEDURE_GEN_ALL_CORE_FT
PROCEDURES:  TURBT, with biopsy 22-May-2024 11:08:52  Soren Garcia  Bilateral ureteral JJ stent placement 22-May-2024 11:09:12  Soren Garcia

## 2024-05-22 NOTE — ASU PATIENT PROFILE, ADULT - NSICDXPASTMEDICALHX_GEN_ALL_CORE_FT
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

## 2024-05-22 NOTE — ASU DISCHARGE PLAN (ADULT/PEDIATRIC) - PROCEDURE
Cystoscopy, left Ureteroscopy, Left tandem stent exchange, transurethral resection of bladder tumors

## 2024-05-22 NOTE — ASU DISCHARGE PLAN (ADULT/PEDIATRIC) - CARE PROVIDER_API CALL
Paul Smith  [facility]  Urology  [address] [place] [address], [place] [id number]  [place] [address]  Phone: [phone]  Fax: [phone]  Follow Up Time:

## 2024-05-23 ENCOUNTER — APPOINTMENT (OUTPATIENT)
Dept: UROLOGY | Facility: CLINIC | Age: 74
End: 2024-05-23
Payer: MEDICARE

## 2024-05-23 ENCOUNTER — OUTPATIENT (OUTPATIENT)
Dept: OUTPATIENT SERVICES | Facility: HOSPITAL | Age: 74
LOS: 1 days | End: 2024-05-23
Payer: MEDICARE

## 2024-05-23 VITALS — HEART RATE: 74 BPM | SYSTOLIC BLOOD PRESSURE: 122 MMHG | DIASTOLIC BLOOD PRESSURE: 56 MMHG

## 2024-05-23 DIAGNOSIS — Z98.890 OTHER SPECIFIED POSTPROCEDURAL STATES: Chronic | ICD-10-CM

## 2024-05-23 DIAGNOSIS — Z96.0 PRESENCE OF UROGENITAL IMPLANTS: Chronic | ICD-10-CM

## 2024-05-23 DIAGNOSIS — Z90.5 ACQUIRED ABSENCE OF KIDNEY: Chronic | ICD-10-CM

## 2024-05-23 DIAGNOSIS — Z98.41 CATARACT EXTRACTION STATUS, RIGHT EYE: Chronic | ICD-10-CM

## 2024-05-23 DIAGNOSIS — R35.0 FREQUENCY OF MICTURITION: ICD-10-CM

## 2024-05-23 PROCEDURE — 51700 IRRIGATION OF BLADDER: CPT

## 2024-05-23 PROCEDURE — 99213 OFFICE O/P EST LOW 20 MIN: CPT | Mod: 25

## 2024-05-24 DIAGNOSIS — Z85.51 PERSONAL HISTORY OF MALIGNANT NEOPLASM OF BLADDER: ICD-10-CM

## 2024-05-24 DIAGNOSIS — C67.9 MALIGNANT NEOPLASM OF BLADDER, UNSPECIFIED: ICD-10-CM

## 2024-05-24 DIAGNOSIS — N13.30 UNSPECIFIED HYDRONEPHROSIS: ICD-10-CM

## 2024-05-24 NOTE — HISTORY OF PRESENT ILLNESS
[FreeTextEntry1] : Follow-up bladder cancer.  Patient previous right nephrectomy with recurrent bladder tumor.  Patient went left exchange yesterday.  He was found to have recurrent bladder lesions lesion biopsy.  Patient has a voiding trial.  Patient was given a voiding trial today. The patient's bladder was filled with 300 cc of water. Patient was able to void spontaneously.  Await final pathology report.  Please refer to URO Consult note

## 2024-05-24 NOTE — ADDENDUM
[FreeTextEntry1] : Entered by Ashu Nichole, acting as scribe for Dr. Paul Smith. The documentation recorded by the scribe accurately reflects the service I personally performed and the decisions made by me.

## 2024-05-24 NOTE — LETTER BODY
[FreeTextEntry1] : Kalin OBREGON Leventhal DO [address] [place] [address] [phone]  Dear Dr. Leventhal,    Reason for visit: Bladder cancer Left ureteral obstruction managed with chronic stent exchanges.    This is a 74 year old gentleman with previous right urothelial carcinoma s/p right nephroureterectomy and recurrent bladder cancer s/p TURBT presenting with left ureteral obstruction. The patient previously experienced a diffuse pruritic rash on arms and legs with Mitomycin bladder instillation. His previous cystoscopy was unremarkable. Patient is here for follow-up. Patient went left exchange yesterday. He was found to have recurrent bladder lesions. He returns today for voiding trial. He has no urinary complaints or difficulties. The patient is overall well. He denies any changes in health. The past medical history and family history and social history are unchanged. All other review of systems are negative. Patient denies any changes in medications. Medication list was reconciled.    On examination, the patient is an older-appearing gentleman in no acute distress. He is alert and oriented follows commands. He has normal mood and affect. He is normocephalic. Neck is supple. Oral no thrush Respirations are unlabored. His abdomen is soft and nontender. Bladder is nonpalpable. No CVA tenderness. Neurologically he is grossly intact. No peripheral edema. Skin without gross abnormality.    Patient was given a voiding trial today. The patient's bladder was filled with 300 cc of water. Patient was able to void spontaneously.    Assessment: Bladder cancer, s/p TURBT. Left ureteral obstruction. Inguinal hernia    I counseled the patient. Patient was found to have recurrent bladder lesions. Patient passed his voiding trial today. We will await for his final pathology report. Risks and alternatives were discussed. I answered the patient questions. The patient will follow-up as directed and will contact me with any questions or concerns. Thank you for the opportunity to participate in the care of Mr. AHMADI. I will keep you updated on his progress.  Patient will continue longitudinal care for his complex and serious chronic condition.  Plan: Await final pathology report. Follow up as directed.  I spent 30-minutes time today on all issues related to this patient on today date of service including non face to face time.

## 2024-05-30 LAB
NON-GYNECOLOGICAL CYTOLOGY STUDY: SIGNIFICANT CHANGE UP
NON-GYNECOLOGICAL CYTOLOGY STUDY: SIGNIFICANT CHANGE UP
SURGICAL PATHOLOGY STUDY: SIGNIFICANT CHANGE UP

## 2024-07-05 NOTE — ASU PREOP CHECKLIST - BSA (M2)
[facility] [employer] [facility]. See referral for details. Patient updated that authorization still pending. Dr Brody, patient's oncologist, updated as well. Informed supervisor, Valerie, as well that authorization still pending. Patient verbalized understanding.    Patient told writer while on this call that he started with blood in his urine 2 days ago. Patient admitted to clots in his urine on Wednesday that were difficult to pass, but states his urine has been getting lighter over the last 2 days. Denies dizziness. Writer updated Dr. Brody. Per MD, patient should go to ER if he starts getting clots in his urine again or if his urine is dark red; patient advised to watch urine if it is pink or orange tinged. Patient does have a history of bladder cancer and admits to having blood in his urine intermittently at times. Patient verbalized understanding of plan of care-no further questions at this time.    1.64

## 2024-09-12 NOTE — H&P PST ADULT - NSANTHOSAYNRD_GEN_A_CORE
[place]
No. MILAN screening performed.  STOP BANG Legend: 0-2 = LOW Risk; 3-4 = INTERMEDIATE Risk; 5-8 = HIGH Risk

## 2024-11-07 ENCOUNTER — APPOINTMENT (OUTPATIENT)
Dept: UROLOGY | Facility: CLINIC | Age: 74
End: 2024-11-07
Payer: MEDICARE

## 2024-11-07 ENCOUNTER — NON-APPOINTMENT (OUTPATIENT)
Age: 74
End: 2024-11-07

## 2024-11-07 DIAGNOSIS — C67.9 MALIGNANT NEOPLASM OF BLADDER, UNSPECIFIED: ICD-10-CM

## 2024-11-07 DIAGNOSIS — N40.1 BENIGN PROSTATIC HYPERPLASIA WITH LOWER URINARY TRACT SYMPMS: ICD-10-CM

## 2024-11-07 DIAGNOSIS — N13.30 UNSPECIFIED HYDRONEPHROSIS: ICD-10-CM

## 2024-11-07 DIAGNOSIS — N13.8 BENIGN PROSTATIC HYPERPLASIA WITH LOWER URINARY TRACT SYMPMS: ICD-10-CM

## 2024-11-07 PROCEDURE — G2211 COMPLEX E/M VISIT ADD ON: CPT

## 2024-11-07 PROCEDURE — 99214 OFFICE O/P EST MOD 30 MIN: CPT

## 2024-11-08 LAB
ANION GAP SERPL CALC-SCNC: 14 MMOL/L
BACTERIA UR CULT: NORMAL
BUN SERPL-MCNC: 50 MG/DL
CALCIUM SERPL-MCNC: 7.8 MG/DL
CHLORIDE SERPL-SCNC: 100 MMOL/L
CO2 SERPL-SCNC: 24 MMOL/L
CREAT SERPL-MCNC: 2.34 MG/DL
EGFR: 28 ML/MIN/1.73M2
GLUCOSE SERPL-MCNC: 136 MG/DL
HCT VFR BLD CALC: 40.8 %
HGB BLD-MCNC: 12.6 G/DL
MCHC RBC-ENTMCNC: 29.3 PG
MCHC RBC-ENTMCNC: 30.9 G/DL
MCV RBC AUTO: 94.9 FL
PLATELET # BLD AUTO: 301 K/UL
POTASSIUM SERPL-SCNC: 5 MMOL/L
RBC # BLD: 4.3 M/UL
RBC # FLD: 13.8 %
SODIUM SERPL-SCNC: 137 MMOL/L
URINE CYTOLOGY: NORMAL
WBC # FLD AUTO: 7.82 K/UL

## 2024-11-20 ENCOUNTER — OUTPATIENT (OUTPATIENT)
Dept: OUTPATIENT SERVICES | Facility: HOSPITAL | Age: 74
LOS: 1 days | End: 2024-11-20
Payer: MEDICARE

## 2024-11-20 VITALS
TEMPERATURE: 98 F | WEIGHT: 130.07 LBS | SYSTOLIC BLOOD PRESSURE: 150 MMHG | OXYGEN SATURATION: 98 % | RESPIRATION RATE: 18 BRPM | HEIGHT: 63 IN | HEART RATE: 85 BPM | DIASTOLIC BLOOD PRESSURE: 70 MMHG

## 2024-11-20 DIAGNOSIS — Z90.5 ACQUIRED ABSENCE OF KIDNEY: Chronic | ICD-10-CM

## 2024-11-20 DIAGNOSIS — Z96.0 PRESENCE OF UROGENITAL IMPLANTS: Chronic | ICD-10-CM

## 2024-11-20 DIAGNOSIS — N13.8 OTHER OBSTRUCTIVE AND REFLUX UROPATHY: ICD-10-CM

## 2024-11-20 DIAGNOSIS — Z98.890 OTHER SPECIFIED POSTPROCEDURAL STATES: Chronic | ICD-10-CM

## 2024-11-20 DIAGNOSIS — Z98.41 CATARACT EXTRACTION STATUS, RIGHT EYE: Chronic | ICD-10-CM

## 2024-11-20 DIAGNOSIS — N13.30 UNSPECIFIED HYDRONEPHROSIS: ICD-10-CM

## 2024-11-20 DIAGNOSIS — Z01.818 ENCOUNTER FOR OTHER PREPROCEDURAL EXAMINATION: ICD-10-CM

## 2024-11-20 DIAGNOSIS — N40.1 BENIGN PROSTATIC HYPERPLASIA WITH LOWER URINARY TRACT SYMPTOMS: ICD-10-CM

## 2024-11-20 PROCEDURE — G0463: CPT

## 2024-11-20 PROCEDURE — 85027 COMPLETE CBC AUTOMATED: CPT

## 2024-11-20 PROCEDURE — 87086 URINE CULTURE/COLONY COUNT: CPT

## 2024-11-20 PROCEDURE — 80048 BASIC METABOLIC PNL TOTAL CA: CPT

## 2024-11-20 NOTE — H&P PST ADULT - ASSESSMENT
DASI score: 7.5  DASI activity: walking daily raking leaves  Loose teeth or denture: upper and lower denture

## 2024-11-20 NOTE — H&P PST ADULT - HISTORY OF PRESENT ILLNESS
74 year old male with PMH HTN, prediabetes, current smoker (with "mild emphysema" detected on CT in 2020 and 2023 - no supplemental O2 - 0.75 PPD x 60 years), benign essential tremor (right hand - as per pt, etiology unknown), right urothelial carcinoma s/p right nephroureterectomy (~2019) and bladder CA s/p TURBT (~25 years ago) requires a left ureteral stent exchange every 6 months for ureteral obstruction with his exchanges in 11/2023, 5/22/2024 Pt has tolerated procedure well. He denies any hematuria, dysuria, flank pain, fever or chills. Pt now presents to PST for scheduled cystoscopy and left stent exchange with Dr. Smith on 12/11/24 at the ambulatory care center. 74 year old male with PMH HTN, prediabetes, current smoker (with "mild emphysema" detected on CT in 2020 and 2023 - no supplemental O2 - 0.75 PPD x 60 years), benign essential tremor (right hand - as per pt, etiology unknown), CKD Stage 3, right urothelial carcinoma s/p right nephroureterectomy (~2019) and bladder CA s/p TURBT (~25 years ago) requires a left ureteral stent exchange every 6 months for ureteral obstruction with his exchanges in 11/2023, 5/22/2024 Pt has tolerated procedure well. He denies any hematuria, dysuria, flank pain, fever or chills. Pt now presents to PST for scheduled cystoscopy and left stent exchange with Dr. Smith on 12/11/24 at the ambulatory care center.

## 2024-11-20 NOTE — H&P PST ADULT - NSICDXPASTMEDICALHX_GEN_ALL_CORE_FT
PAST MEDICAL HISTORY:  Arthritis     Bladder cancer x 20 yrs    Current every day smoker     Emphysema lung diagnosed ct scan 2020    Enlarged prostate BPH    H/O benign essential tremor     History of prediabetes     HTN (hypertension)     Other obstructive and reflux uropathy     Retained ureteral stent     Ureter cancer, right 2017 no chemo     PAST MEDICAL HISTORY:  Arthritis     Bladder cancer x 20 yrs    Current every day smoker     Emphysema lung diagnosed ct scan 2020    Enlarged prostate BPH    H/O benign essential tremor     History of prediabetes     HTN (hypertension)     Other obstructive and reflux uropathy     Retained ureteral stent     Stage 3 chronic kidney disease     Ureter cancer, right 2017 no chemo

## 2024-11-20 NOTE — H&P PST ADULT - ATTENDING COMMENTS
Patient with left ureteral obstruction. Patient wishes to proceed with left stent exchange. Informed consent was obtained. Alternatives were given. Risks including but not limited to bleeding, infection, risk of anesthesia, pain, failure to cure,  need for future procedure, and injury to surrounding structures were discussed. Patient wishes to proceed with procedure. Patient with left ureteral obstruction. Patient wishes to proceed with left stent exchange possible TURBT . 0Informed consent was obtained. Alternatives were given. Risks including but not limited to bleeding, infection, risk of anesthesia, pain, failure to cure,  need for future procedure, and injury to surrounding structures were discussed. Patient wishes to proceed with procedure. Patient with left ureteral obstruction and previous bladder cancer.  Patient wishes to proceed with left stent exchange possible TURBT . 0Informed consent was obtained. Alternatives were given. Risks including but not limited to bleeding, infection, risk of anesthesia, pain, failure to cure,  need for future procedure, and injury to surrounding structures were discussed. Patient wishes to proceed with procedure.

## 2024-12-11 ENCOUNTER — OUTPATIENT (OUTPATIENT)
Dept: OUTPATIENT SERVICES | Facility: HOSPITAL | Age: 74
LOS: 1 days | End: 2024-12-11
Payer: MEDICARE

## 2024-12-11 ENCOUNTER — APPOINTMENT (OUTPATIENT)
Dept: UROLOGY | Facility: HOSPITAL | Age: 74
End: 2024-12-11

## 2024-12-11 ENCOUNTER — TRANSCRIPTION ENCOUNTER (OUTPATIENT)
Age: 74
End: 2024-12-11

## 2024-12-11 ENCOUNTER — RESULT REVIEW (OUTPATIENT)
Age: 74
End: 2024-12-11

## 2024-12-11 VITALS
SYSTOLIC BLOOD PRESSURE: 137 MMHG | HEIGHT: 63 IN | HEART RATE: 80 BPM | OXYGEN SATURATION: 97 % | WEIGHT: 130.07 LBS | RESPIRATION RATE: 17 BRPM | TEMPERATURE: 97 F | DIASTOLIC BLOOD PRESSURE: 59 MMHG

## 2024-12-11 VITALS
SYSTOLIC BLOOD PRESSURE: 144 MMHG | OXYGEN SATURATION: 99 % | HEART RATE: 70 BPM | RESPIRATION RATE: 18 BRPM | DIASTOLIC BLOOD PRESSURE: 71 MMHG

## 2024-12-11 DIAGNOSIS — Z96.0 PRESENCE OF UROGENITAL IMPLANTS: Chronic | ICD-10-CM

## 2024-12-11 DIAGNOSIS — Z98.890 OTHER SPECIFIED POSTPROCEDURAL STATES: Chronic | ICD-10-CM

## 2024-12-11 DIAGNOSIS — Z90.5 ACQUIRED ABSENCE OF KIDNEY: Chronic | ICD-10-CM

## 2024-12-11 DIAGNOSIS — N40.1 BENIGN PROSTATIC HYPERPLASIA WITH LOWER URINARY TRACT SYMPTOMS: ICD-10-CM

## 2024-12-11 DIAGNOSIS — N13.30 UNSPECIFIED HYDRONEPHROSIS: ICD-10-CM

## 2024-12-11 DIAGNOSIS — Z98.41 CATARACT EXTRACTION STATUS, RIGHT EYE: Chronic | ICD-10-CM

## 2024-12-11 DIAGNOSIS — N13.8 OTHER OBSTRUCTIVE AND REFLUX UROPATHY: ICD-10-CM

## 2024-12-11 PROCEDURE — 88112 CYTOPATH CELL ENHANCE TECH: CPT | Mod: 26

## 2024-12-11 PROCEDURE — 52332 CYSTOSCOPY AND TREATMENT: CPT | Mod: LT

## 2024-12-11 PROCEDURE — C2617: CPT

## 2024-12-11 PROCEDURE — 88112 CYTOPATH CELL ENHANCE TECH: CPT

## 2024-12-11 PROCEDURE — C1769: CPT

## 2024-12-11 PROCEDURE — 76000 FLUOROSCOPY <1 HR PHYS/QHP: CPT

## 2024-12-11 PROCEDURE — C1758: CPT

## 2024-12-11 PROCEDURE — 74420 UROGRAPHY RTRGR +-KUB: CPT | Mod: 26

## 2024-12-11 DEVICE — IMPLANTABLE DEVICE: Type: IMPLANTABLE DEVICE | Site: LEFT | Status: FUNCTIONAL

## 2024-12-11 DEVICE — STENT URET INLAY OPTIMA 6FRX26CM: Type: IMPLANTABLE DEVICE | Site: LEFT | Status: FUNCTIONAL

## 2024-12-11 DEVICE — GUIDEWIRE SENSOR DUAL-FLEX NITINOL STRAIGHT .035" X 150CM: Type: IMPLANTABLE DEVICE | Site: LEFT | Status: FUNCTIONAL

## 2024-12-11 DEVICE — URETERAL CATH OPEN END 5FR 70CM: Type: IMPLANTABLE DEVICE | Site: LEFT | Status: FUNCTIONAL

## 2024-12-11 RX ORDER — LIDOCAINE HCL 20 MG/ML
0.2 VIAL (ML) INJECTION ONCE
Refills: 0 | Status: COMPLETED | OUTPATIENT
Start: 2024-12-11 | End: 2024-12-11

## 2024-12-11 RX ORDER — OXYCODONE HYDROCHLORIDE 30 MG/1
5 TABLET ORAL ONCE
Refills: 0 | Status: DISCONTINUED | OUTPATIENT
Start: 2024-12-11 | End: 2024-12-11

## 2024-12-11 RX ORDER — ONDANSETRON HYDROCHLORIDE 4 MG/1
4 TABLET, FILM COATED ORAL ONCE
Refills: 0 | Status: DISCONTINUED | OUTPATIENT
Start: 2024-12-11 | End: 2024-12-25

## 2024-12-11 RX ORDER — HYDROMORPHONE HYDROCHLORIDE 2 MG/1
0.5 TABLET ORAL ONCE
Refills: 0 | Status: DISCONTINUED | OUTPATIENT
Start: 2024-12-11 | End: 2024-12-11

## 2024-12-11 RX ORDER — CEPHALEXIN 500 MG
1 CAPSULE ORAL
Qty: 9 | Refills: 0
Start: 2024-12-11 | End: 2024-12-13

## 2024-12-11 RX ORDER — CEFAZOLIN SODIUM 10 G
2000 VIAL (EA) INJECTION ONCE
Refills: 0 | Status: COMPLETED | OUTPATIENT
Start: 2024-12-11 | End: 2024-12-11

## 2024-12-11 NOTE — ASU PATIENT PROFILE, ADULT - NSTOBACCO TYPE_GEN_A_CORE_RD
Received refill request for Cosopt for patient. Will send to Dr. Byrd to sign and send.    Cigarettes

## 2024-12-11 NOTE — ASU DISCHARGE PLAN (ADULT/PEDIATRIC) - NURSING INSTRUCTIONS
You can take Tylenol (Acetaminophen) every 6 hours, as needed. Do not exceed 4000mg of Tylenol (Acetaminophen) daily. The next time you can take Tylenol is at 730PM, if needed for pain and no contraindications.

## 2024-12-11 NOTE — ASU DISCHARGE PLAN (ADULT/PEDIATRIC) - CARE PROVIDER_API CALL
Paul Smith  [facility]  Urology  [address] [place] [address], [address]  [place] [address]  Phone: [phone]  Fax: [phone]  Follow Up Time: Routine

## 2024-12-11 NOTE — ASU DISCHARGE PLAN (ADULT/PEDIATRIC) - ASU DC SPECIAL INSTRUCTIONSFT
It is common to have blood in the urine after your procedure.  It may be pink or even red; inform your doctor if you have a significant amount of clots in the urine or if you are unable to void at all.  -It is not uncommon to have some burning when you urinate, this will improve over the next few days.  -You have an internal stent (a hollow tube that runs from the kidney to your bladder) after your procedure, helping your kidney drain down to your bladder after your surgery.  Some patients do not notice that they have a stent, while others complain of the sensation of needing to urinate frequently, burning on urination, or even some back pain (especially when they go to urinate). These sensations usually improve gradually, some faster than others. This is not uncommon, but may initially warrant the use of the pain medication which you were prescribed.  While the stent is in place, your urine may continue to be bloody. This stent is temporary and must be removed/exchanged by your urologist.  -Provided that you are not restricted with fluids by your physician, you should drink 6-8 (8 oz.) glasses of fluid per day.  -You may resume your regular diet and regular medication regimen.    -You may shower or bathe.    -You may take over the counter pain medications such as Motrin and Tylenol as needed for pain.  Do not exceed 4 grams of Tylenol daily.  Each medication may be taken every 6 hours.  You may alternate these medications such that you take either one every 3 hours.  If you have severe pain that does not improve with the pain medication or you have persistent vomiting, call your doctor.  -You may have a prescription for an antibiotic when you go home.  Take this medication as instructed; if you miss one dose, resume taking it on your previous schedule until you have completed the entire course of treatment.  -As you have just underwent general anesthesia, you should refrain from driving, heavy lifting, smoking, alcohol consumption, or important decision making for the next 24 hours.  You may climb stairs and you may resume sexual activity.  -Call your physician if you have a fever over 101F.  -Make a follow up appointment for with your urologist when you arrive home (or the next business day).  -Call your urologist during normal business hours with any other routine questions.    FOLLOW UP IN THE OFFICE IN 6 MONTHS

## 2024-12-11 NOTE — ASU PATIENT PROFILE, ADULT - NSICDXPASTMEDICALHX_GEN_ALL_CORE_FT
PAST MEDICAL HISTORY:  Arthritis     Bladder cancer x 20 yrs    Current every day smoker     Emphysema lung diagnosed ct scan 2020    Enlarged prostate BPH    H/O benign essential tremor     History of prediabetes     HTN (hypertension)     Other obstructive and reflux uropathy     Retained ureteral stent     Stage 3 chronic kidney disease     Ureter cancer, right 2017 no chemo

## 2024-12-11 NOTE — PRE-ANESTHESIA EVALUATION ADULT - NSANTHPMHFT_GEN_ALL_CORE
kidney function is being monitored, no intervention  no worsening SOB kidney function is being monitored, no intervention  no worsening SOB, denies any CP  ST changes on ECG unchanged per PMD, recent eval in the chart  The patient exercises regularly, walks 2 miles a day, does yardwork

## 2024-12-11 NOTE — ASU DISCHARGE PLAN (ADULT/PEDIATRIC) - FINANCIAL ASSISTANCE
[facility] provides services at a reduced cost to those who are determined to be eligible through [facility]’s financial assistance program. Information regarding [facility]’s financial assistance program can be found by going to [email or web address] or by calling [phone].

## 2024-12-13 LAB — NON-GYNECOLOGICAL CYTOLOGY STUDY: SIGNIFICANT CHANGE UP

## 2025-01-19 NOTE — ASU DISCHARGE PLAN (ADULT/PEDIATRIC) - YOU MAY EXPERIENCE SOME ITCHING OR SCRATCHINESS AFTER SURGERY.  THIS IS NORMAL
Statement Selected [facility] [place]  Address: [address] [place] [id number]  Phone: [phone]    You were seen for lower back pain. This pain is likely due to a muscle strain or disc herniation. For symptoms, please take motrin OR ibuprofen OR advil (600 mg by mouth up to every 6 hours with food and water) AND tylenol (650 mg up to every 6 hours)    You may also use a heating pad for additional comfort.     Please return to the ER immediately if you develop leg weakness, loss of bowel or bladder control (meaning that you are urinating or defecating on yourself when you did not mean to or are unable to urinate or defecate when you feel you must.  Also return to the ER if you develop numbness of your groin or genitals.  Even if the pain is well controlled or you are able to tolerate the pain, development of any of these symptoms may represent a medical emergency requiring immediate intervention.  Do not delay presentation should these occur.    Please follow-up with a spinal specialist as indicated in your paperwork for ongoing care.

## 2025-03-25 PROBLEM — N18.30 CHRONIC KIDNEY DISEASE, STAGE 3 UNSPECIFIED: Chronic | Status: ACTIVE | Noted: 2024-11-21

## 2025-06-02 NOTE — H&P PST ADULT - SPO2 (%)
96
What Type Of Note Output Would You Prefer (Optional)?: Bullet Format
Hpi Title: Evaluation of Skin Lesions

## 2025-06-19 ENCOUNTER — APPOINTMENT (OUTPATIENT)
Dept: UROLOGY | Facility: CLINIC | Age: 75
End: 2025-06-19
Payer: MEDICARE

## 2025-06-19 PROCEDURE — 99214 OFFICE O/P EST MOD 30 MIN: CPT

## 2025-06-19 PROCEDURE — G2211 COMPLEX E/M VISIT ADD ON: CPT

## 2025-06-22 LAB
ANION GAP SERPL CALC-SCNC: 15 MMOL/L
APPEARANCE: CLEAR
BACTERIA UR CULT: NORMAL
BACTERIA: NEGATIVE /HPF
BILIRUBIN URINE: NEGATIVE
BLOOD URINE: ABNORMAL
BUN SERPL-MCNC: 48 MG/DL
CALCIUM SERPL-MCNC: 8.3 MG/DL
CAST: 0 /LPF
CHLORIDE SERPL-SCNC: 103 MMOL/L
CO2 SERPL-SCNC: 22 MMOL/L
COLOR: YELLOW
CREAT SERPL-MCNC: 2.46 MG/DL
EGFRCR SERPLBLD CKD-EPI 2021: 26 ML/MIN/1.73M2
EPITHELIAL CELLS: 3 /HPF
GLUCOSE QUALITATIVE U: NEGATIVE MG/DL
GLUCOSE SERPL-MCNC: 128 MG/DL
HCT VFR BLD CALC: 38.7 %
HGB BLD-MCNC: 12.5 G/DL
KETONES URINE: NEGATIVE MG/DL
LEUKOCYTE ESTERASE URINE: ABNORMAL
MCHC RBC-ENTMCNC: 28.7 PG
MCHC RBC-ENTMCNC: 32.3 G/DL
MCV RBC AUTO: 88.8 FL
MICROSCOPIC-UA: NORMAL
NITRITE URINE: NEGATIVE
PH URINE: 6
PLATELET # BLD AUTO: 281 K/UL
POTASSIUM SERPL-SCNC: 5.1 MMOL/L
PROTEIN URINE: 30 MG/DL
PSA FREE FLD-MCNC: 27 %
PSA FREE SERPL-MCNC: 0.26 NG/ML
PSA SERPL-MCNC: 0.96 NG/ML
RBC # BLD: 4.36 M/UL
RBC # FLD: 12.9 %
RED BLOOD CELLS URINE: 26 /HPF
SODIUM SERPL-SCNC: 139 MMOL/L
SPECIFIC GRAVITY URINE: 1.01
URINE CYTOLOGY: NORMAL
UROBILINOGEN URINE: 0.2 MG/DL
WBC # FLD AUTO: 8.6 K/UL
WHITE BLOOD CELLS URINE: 5 /HPF

## 2025-06-23 NOTE — ASU DISCHARGE PLAN (ADULT/PEDIATRIC) - PLEASE INDICATE TEMPERATURE IN FAHRENHEIT OR CELSIUS
TIMEOUT PERFORMED  Patient identified, procedure confirmed, and allergies reviewed.     LEEP PROCEDURE:    Female patient  is here for LEEP.    DATA REVIEWED:  Last Pap:  Last Colposcopy biopsy result:    PRE- LEEP PROCEDURE COUNSELING:  Risks of infection, bleeding, pain, injury to adjacent organs as well as future cervical incompetence.  That this procedure does not guarantee to completely remove all abnormal cells and that dysplasia may reoccur in the future.  Alternatives to the LEEP procedure were discussed and the patient agrees to proceed.    PROCEDURE:  TIME OUT PERFORMED.  The cervix and transformation zone were visualized with a speculum and a local block was obtained with 2 cc of 2% Xylocaine with epinephrine.  A medium wide size loop was selected.  The entire transformation zone was excised.  Monsels solution was applied to the base to obtain hemostasis and the speculum removed.  The specimen was placed in formalyn and sent to Pathology for a Histopathologic diagnosis.  The patient tolerated the procedure well.    ASSESSMENT:  1.Cervical dysplasia. 622.1.    POST LEEP PROCEDURE COUNSELING:  Manage post LEEP cramping with NSAIDs or Tylenol or Rx per [facility].  Avoid anything in vagina (intercourse, douching, tampons) two weeks after the Procedure.  Expect a watery grey to yellow vaginal discharge for several weeks.  Limit activity for 2 weeks.  Report bleeding heavier than a period, worsening pain, fever > 101.0 F, or foul-smelling vaginal discharge.  Importance of follow-up stressed.    Counseling lasted approximately 15 minutes and all her questions were answered.    FOLLOW-UP based on pathology results.      
100.3

## 2025-06-30 ENCOUNTER — OUTPATIENT (OUTPATIENT)
Dept: OUTPATIENT SERVICES | Facility: HOSPITAL | Age: 75
LOS: 1 days | End: 2025-06-30
Payer: MEDICARE

## 2025-06-30 VITALS
WEIGHT: 131.4 LBS | SYSTOLIC BLOOD PRESSURE: 142 MMHG | TEMPERATURE: 98 F | DIASTOLIC BLOOD PRESSURE: 74 MMHG | OXYGEN SATURATION: 96 % | HEIGHT: 64 IN | RESPIRATION RATE: 16 BRPM | HEART RATE: 80 BPM

## 2025-06-30 DIAGNOSIS — Z98.890 OTHER SPECIFIED POSTPROCEDURAL STATES: Chronic | ICD-10-CM

## 2025-06-30 DIAGNOSIS — Z90.5 ACQUIRED ABSENCE OF KIDNEY: Chronic | ICD-10-CM

## 2025-06-30 DIAGNOSIS — Z98.41 CATARACT EXTRACTION STATUS, RIGHT EYE: Chronic | ICD-10-CM

## 2025-06-30 DIAGNOSIS — Z96.0 PRESENCE OF UROGENITAL IMPLANTS: Chronic | ICD-10-CM

## 2025-06-30 DIAGNOSIS — C67.9 MALIGNANT NEOPLASM OF BLADDER, UNSPECIFIED: ICD-10-CM

## 2025-06-30 DIAGNOSIS — Z01.818 ENCOUNTER FOR OTHER PREPROCEDURAL EXAMINATION: ICD-10-CM

## 2025-06-30 DIAGNOSIS — I10 ESSENTIAL (PRIMARY) HYPERTENSION: ICD-10-CM

## 2025-06-30 LAB
ANION GAP SERPL CALC-SCNC: 16 MMOL/L — SIGNIFICANT CHANGE UP (ref 5–17)
BUN SERPL-MCNC: 43 MG/DL — HIGH (ref 7–23)
CALCIUM SERPL-MCNC: 7.9 MG/DL — LOW (ref 8.4–10.5)
CHLORIDE SERPL-SCNC: 102 MMOL/L — SIGNIFICANT CHANGE UP (ref 96–108)
CO2 SERPL-SCNC: 20 MMOL/L — LOW (ref 22–31)
CREAT SERPL-MCNC: 2.31 MG/DL — HIGH (ref 0.5–1.3)
EGFR: 28 ML/MIN/1.73M2 — LOW
EGFR: 28 ML/MIN/1.73M2 — LOW
GLUCOSE SERPL-MCNC: 120 MG/DL — HIGH (ref 70–99)
HCT VFR BLD CALC: 38.8 % — LOW (ref 39–50)
HGB BLD-MCNC: 12.6 G/DL — LOW (ref 13–17)
MCHC RBC-ENTMCNC: 29.3 PG — SIGNIFICANT CHANGE UP (ref 27–34)
MCHC RBC-ENTMCNC: 32.5 G/DL — SIGNIFICANT CHANGE UP (ref 32–36)
MCV RBC AUTO: 90.2 FL — SIGNIFICANT CHANGE UP (ref 80–100)
NRBC # BLD AUTO: 0 K/UL — SIGNIFICANT CHANGE UP (ref 0–0)
NRBC # FLD: 0 K/UL — SIGNIFICANT CHANGE UP (ref 0–0)
NRBC BLD AUTO-RTO: 0 /100 WBCS — SIGNIFICANT CHANGE UP (ref 0–0)
PLATELET # BLD AUTO: 260 K/UL — SIGNIFICANT CHANGE UP (ref 150–400)
PMV BLD: 10.9 FL — SIGNIFICANT CHANGE UP (ref 7–13)
POTASSIUM SERPL-MCNC: 4.4 MMOL/L — SIGNIFICANT CHANGE UP (ref 3.5–5.3)
POTASSIUM SERPL-SCNC: 4.4 MMOL/L — SIGNIFICANT CHANGE UP (ref 3.5–5.3)
RBC # BLD: 4.3 M/UL — SIGNIFICANT CHANGE UP (ref 4.2–5.8)
RBC # FLD: 12.9 % — SIGNIFICANT CHANGE UP (ref 10.3–14.5)
SODIUM SERPL-SCNC: 138 MMOL/L — SIGNIFICANT CHANGE UP (ref 135–145)
WBC # BLD: 7.46 K/UL — SIGNIFICANT CHANGE UP (ref 3.8–10.5)
WBC # FLD AUTO: 7.46 K/UL — SIGNIFICANT CHANGE UP (ref 3.8–10.5)

## 2025-06-30 PROCEDURE — G0463: CPT

## 2025-06-30 PROCEDURE — 85027 COMPLETE CBC AUTOMATED: CPT

## 2025-06-30 PROCEDURE — 87086 URINE CULTURE/COLONY COUNT: CPT

## 2025-06-30 PROCEDURE — 80048 BASIC METABOLIC PNL TOTAL CA: CPT

## 2025-06-30 RX ORDER — SODIUM CHLORIDE 9 G/1000ML
1000 INJECTION, SOLUTION INTRAVENOUS
Refills: 0 | Status: DISCONTINUED | OUTPATIENT
Start: 2025-07-09 | End: 2025-07-23

## 2025-06-30 NOTE — H&P PST ADULT - PROBLEM SELECTOR PLAN 1
Scheduled for Left Ureteral Stent Exchange  Pre-procedure labs collected. PST instructions provided. Patient verbalized understanding of instructions.

## 2025-06-30 NOTE — H&P PST ADULT - ATTENDING COMMENTS
Patient with history of bladder cancer and right nephroureterectomy for urothelial carcinoma, chronic renal insufficiency and chronic left ureteral obstruction.  Patient returns today for left ureteral stent exchange and possible TURBT. Informed consent was obtained. Alternatives were given. Risks including but not limited to bleeding, infection, risk of anesthesia, pain, failure to cure, need for future procedure, and injury to surrounding structures were discussed. Patient wishes to proceed with procedure. Patient with history of bladder cancer and right nephroureterectomy for urothelial carcinoma, chronic renal insufficiency and chronic left ureteral obstruction.   Patient returns today for left ureteral stent exchange and possible TURBT. Informed consent was obtained. Alternatives were given. Risks including but not limited to bleeding, infection, risk of anesthesia, pain, failure to cure, need for future procedure, and injury to surrounding structures were discussed. Patient wishes to proceed with procedure.

## 2025-06-30 NOTE — H&P PST ADULT - NSICDXFAMILYHX_GEN_ALL_CORE_FT
Yes FAMILY HISTORY:  FH: lung cancer, Mother  FH: type 2 diabetes, Father  FHx: heart disease, CABG - brother

## 2025-06-30 NOTE — H&P PST ADULT - HISTORY OF PRESENT ILLNESS
74 y/o M with PMHx significant for Current Smoker, Mild Emphysema, no supplemental o2, Essential Tremor (right hand), CKD stage 3, Bladder Cancer s/p TUBRT (~25 years ago, subsequent cystoscopy(s), Right Urothelial Carcinoma s/p Nephroureterectomy (2019), recommended for ureteral stent exchange every 6 months due history of ureteral obstruction/left hydronephrosis. He has tolerated procedure many times. Patient is now scheduled for Left Stent Exchange with Dr. Smith on 07/09/2025.

## 2025-07-01 LAB
CULTURE RESULTS: SIGNIFICANT CHANGE UP
SPECIMEN SOURCE: SIGNIFICANT CHANGE UP

## 2025-07-09 ENCOUNTER — APPOINTMENT (OUTPATIENT)
Dept: UROLOGY | Facility: HOSPITAL | Age: 75
End: 2025-07-09

## 2025-07-09 ENCOUNTER — OUTPATIENT (OUTPATIENT)
Dept: OUTPATIENT SERVICES | Facility: HOSPITAL | Age: 75
LOS: 1 days | End: 2025-07-09
Payer: MEDICARE

## 2025-07-09 ENCOUNTER — TRANSCRIPTION ENCOUNTER (OUTPATIENT)
Age: 75
End: 2025-07-09

## 2025-07-09 ENCOUNTER — RESULT REVIEW (OUTPATIENT)
Age: 75
End: 2025-07-09

## 2025-07-09 VITALS
HEART RATE: 75 BPM | OXYGEN SATURATION: 96 % | RESPIRATION RATE: 15 BRPM | DIASTOLIC BLOOD PRESSURE: 63 MMHG | SYSTOLIC BLOOD PRESSURE: 138 MMHG

## 2025-07-09 VITALS
HEIGHT: 64 IN | OXYGEN SATURATION: 98 % | DIASTOLIC BLOOD PRESSURE: 63 MMHG | RESPIRATION RATE: 16 BRPM | SYSTOLIC BLOOD PRESSURE: 127 MMHG | TEMPERATURE: 98 F | HEART RATE: 74 BPM | WEIGHT: 131.4 LBS

## 2025-07-09 DIAGNOSIS — Z98.890 OTHER SPECIFIED POSTPROCEDURAL STATES: Chronic | ICD-10-CM

## 2025-07-09 DIAGNOSIS — C67.9 MALIGNANT NEOPLASM OF BLADDER, UNSPECIFIED: ICD-10-CM

## 2025-07-09 DIAGNOSIS — Z98.41 CATARACT EXTRACTION STATUS, RIGHT EYE: Chronic | ICD-10-CM

## 2025-07-09 DIAGNOSIS — Z96.0 PRESENCE OF UROGENITAL IMPLANTS: Chronic | ICD-10-CM

## 2025-07-09 DIAGNOSIS — Z90.5 ACQUIRED ABSENCE OF KIDNEY: Chronic | ICD-10-CM

## 2025-07-09 PROCEDURE — 52332 CYSTOSCOPY AND TREATMENT: CPT | Mod: LT

## 2025-07-09 PROCEDURE — 52332 CYSTOSCOPY AND TREATMENT: CPT | Mod: XS,LT

## 2025-07-09 PROCEDURE — 52234 CYSTOSCOPY AND TREATMENT: CPT

## 2025-07-09 PROCEDURE — 76000 FLUOROSCOPY <1 HR PHYS/QHP: CPT

## 2025-07-09 PROCEDURE — C1758: CPT

## 2025-07-09 PROCEDURE — 88112 CYTOPATH CELL ENHANCE TECH: CPT | Mod: 26

## 2025-07-09 PROCEDURE — 74420 UROGRAPHY RTRGR +-KUB: CPT | Mod: 26

## 2025-07-09 PROCEDURE — 88112 CYTOPATH CELL ENHANCE TECH: CPT

## 2025-07-09 PROCEDURE — 88305 TISSUE EXAM BY PATHOLOGIST: CPT | Mod: 26

## 2025-07-09 PROCEDURE — C1769: CPT

## 2025-07-09 PROCEDURE — C2617: CPT

## 2025-07-09 PROCEDURE — 88305 TISSUE EXAM BY PATHOLOGIST: CPT

## 2025-07-09 DEVICE — IMPLANTABLE DEVICE: Type: IMPLANTABLE DEVICE | Site: LEFT | Status: FUNCTIONAL

## 2025-07-09 DEVICE — URETERAL CATH OPEN END 5FR 70CM: Type: IMPLANTABLE DEVICE | Site: LEFT | Status: FUNCTIONAL

## 2025-07-09 DEVICE — GUIDEWIRE SENSOR DUAL-FLEX NITINOL STRAIGHT .035" X 150CM: Type: IMPLANTABLE DEVICE | Site: LEFT | Status: FUNCTIONAL

## 2025-07-09 DEVICE — STENT URET INLAY OPTIMA 6FRX26CM: Type: IMPLANTABLE DEVICE | Site: LEFT | Status: FUNCTIONAL

## 2025-07-09 RX ORDER — CEFAZOLIN SODIUM IN 0.9 % NACL 3 G/100 ML
2000 INTRAVENOUS SOLUTION, PIGGYBACK (ML) INTRAVENOUS ONCE
Refills: 0 | Status: COMPLETED | OUTPATIENT
Start: 2025-07-09 | End: 2025-07-09

## 2025-07-09 RX ORDER — CEPHALEXIN 250 MG/1
1 CAPSULE ORAL
Qty: 9 | Refills: 0
Start: 2025-07-09 | End: 2025-07-11

## 2025-07-09 RX ORDER — LIDOCAINE HCL/PF 10 MG/ML
0.2 VIAL (ML) INJECTION ONCE
Refills: 0 | Status: COMPLETED | OUTPATIENT
Start: 2025-07-09 | End: 2025-07-09

## 2025-07-09 RX ADMIN — Medication 3 MILLILITER(S): at 09:53

## 2025-07-09 RX ADMIN — SODIUM CHLORIDE 100 MILLILITER(S): 9 INJECTION, SOLUTION INTRAVENOUS at 09:53

## 2025-07-09 NOTE — ASU DISCHARGE PLAN (ADULT/PEDIATRIC) - PROCEDURE
cystoscoy, left retrograde pyelogram, left tandem stent exchange, biopsy of left tumor of the ureteral orifice

## 2025-07-09 NOTE — ASU DISCHARGE PLAN (ADULT/PEDIATRIC) - ASU DC SPECIAL INSTRUCTIONSFT
GENERAL: It is common to have blood in your urine after your procedure. It may be pink or even red; inform your doctor if you have a significant amount of clot in the urine or if you are unable to void at all or if your catheter stops draining. The urine may clear and then become bloody again especially as you are more physically active. It is not uncommon to have some burning when you urinate, this will gradually improve. With a catheter in place, it is not uncommon to have occasional leakage or urine or blood around the catheter. Please call your urologist if this is excessive and/or the urine is not draining through the catheter into the bag.  BATHING: You may shower or bathe. If going home with reeves, shower only until catheter is removed.  DIET: You may resume your regular diet and regular medication regimen.  PAIN: You may take Tylenol (acetaminophen) 650-975mg ,available over the counter, for pain every 6 hours as needed. Do not exceed 4000mg of Tylenol (acetaminophen) daily. You may take around the clock pain coverage.  ANTIBIOTICS: You have been given a prescription for an antibiotic, please take this medication as instructed and be sure to complete the entire course.  STOOL SOFTENERS: Do not allow yourself to become constipated as straining may cause bleeding. Take stool softeners or a laxative (ex. Miralax, Colace, Senokot, ExLax, etc), available over the counter, if needed.  ACTIVITY: No heavy lifting or strenuous exercise until you are evaluated at your post-operative appointment. Otherwise, you may return to your usual level of physical activity.  FOLLOW-UP: If you did not already schedule your post-operative appointment, please call your urologist to schedule a follow-up appointment.  CALL YOUR UROLOGIST IF: You have any bleeding that does not stop, inability to void >8 hours, fever over 100.4 F, chills, persistent nausea/vomiting, changes in your incision concerning for infection, or if your pain is not controlled on your discharge pain medications.

## 2025-07-09 NOTE — ASU DISCHARGE PLAN (ADULT/PEDIATRIC) - CARE PROVIDER_API CALL
Paul Smith  [facility]  Urology  [address] [place] [address], [facility] [address]  [place] [address]  Phone: [phone]  Fax: [phone]  Follow Up Time: 2 weeks

## 2025-07-09 NOTE — PRE-ANESTHESIA EVALUATION ADULT - NSANTHPMHFT_GEN_ALL_CORE
76 y/o M with PMHx significant for Current Smoker, Mild Emphysema, no supplemental o2, Essential Tremor (right hand), CKD stage 3, Bladder Cancer s/p TUBRT (~25 years ago, subsequent cystoscopy(s), Right Urothelial Carcinoma s/p Nephroureterectomy (2019), recommended for ureteral stent exchange every 6 months due history of ureteral obstruction/left hydronephrosis. He has tolerated procedure many times. Patient is now scheduled for Left Stent Exchange

## 2025-07-11 LAB — NON-GYNECOLOGICAL CYTOLOGY STUDY: SIGNIFICANT CHANGE UP

## 2025-07-14 LAB — SURGICAL PATHOLOGY STUDY: SIGNIFICANT CHANGE UP

## 2025-07-17 ENCOUNTER — NON-APPOINTMENT (OUTPATIENT)
Age: 75
End: 2025-07-17

## 2025-07-21 NOTE — ASU PATIENT PROFILE, ADULT - PACKS PER DAY
Discharge Planning Assessment  [facility]     Patient Name: Gee Whitney  MRN: [id number]  Today's Date: 7/21/2025    Admit Date: 7/21/2025    Plan: IDP   Discharge Needs Assessment       Row Name 07/21/25 1146       Living Environment    People in Home spouse    Current Living Arrangements home    Primary Care Provided by self       Transition Planning    Transportation Anticipated family or friend will provide       Discharge Needs Assessment    Readmission Within the Last 30 Days unable to assess    Equipment Currently Used at Home none    Concerns to be Addressed denies needs/concerns at this time                   Discharge Plan       Row Name 07/21/25 1147       Plan    Plan IDP    Plan Comments MSW met with Pt and family member at bedside to complete IDP. Pt lives with spouse in [place]. pt’s PCP is Dr. Blevins and Pt has [employer] insurance coverage. Pt independent at baseline; Pt reports no DME, HH, or home O2. Pt’s family can transport when medically ready. Pt denied needs or concerns. CM will continue to follow.                  Continued Care and Services - Admitted Since 7/21/2025    No active coordination exists.          Demographic Summary       Row Name 07/21/25 1146       General Information    Arrived From home    Referral Source admission list;emergency department    Reason for Consult discharge planning    Preferred Language English                   Functional Status       Row Name 07/21/25 1146       Functional Status    Usual Activity Tolerance good    Current Activity Tolerance good       Functional Status, IADL    Medications independent    Meal Preparation independent    Housekeeping independent    Laundry independent    Shopping independent                   Psychosocial    No documentation.                       RON Hollingsworth    
0.5

## 2025-07-22 ENCOUNTER — NON-APPOINTMENT (OUTPATIENT)
Age: 75
End: 2025-07-22

## 2025-08-21 ENCOUNTER — OUTPATIENT (OUTPATIENT)
Dept: OUTPATIENT SERVICES | Facility: HOSPITAL | Age: 75
LOS: 1 days | End: 2025-08-21
Payer: MEDICARE

## 2025-08-21 ENCOUNTER — APPOINTMENT (OUTPATIENT)
Dept: UROLOGY | Facility: CLINIC | Age: 75
End: 2025-08-21
Payer: MEDICARE

## 2025-08-21 VITALS
DIASTOLIC BLOOD PRESSURE: 85 MMHG | HEART RATE: 77 BPM | SYSTOLIC BLOOD PRESSURE: 140 MMHG | RESPIRATION RATE: 16 BRPM | TEMPERATURE: 98 F | OXYGEN SATURATION: 98 %

## 2025-08-21 DIAGNOSIS — Z98.890 OTHER SPECIFIED POSTPROCEDURAL STATES: Chronic | ICD-10-CM

## 2025-08-21 DIAGNOSIS — Z98.41 CATARACT EXTRACTION STATUS, RIGHT EYE: Chronic | ICD-10-CM

## 2025-08-21 DIAGNOSIS — Z96.0 PRESENCE OF UROGENITAL IMPLANTS: Chronic | ICD-10-CM

## 2025-08-21 DIAGNOSIS — Z90.5 ACQUIRED ABSENCE OF KIDNEY: Chronic | ICD-10-CM

## 2025-08-21 DIAGNOSIS — R35.0 FREQUENCY OF MICTURITION: ICD-10-CM

## 2025-08-21 PROCEDURE — 51720 TREATMENT OF BLADDER LESION: CPT

## 2025-08-21 RX ORDER — GEMCITABINE HYDROCHLORIDE 2 G/52.6ML
2 INJECTION INTRAVENOUS
Refills: 0 | Status: COMPLETED | OUTPATIENT
Start: 2025-08-21

## 2025-08-21 RX ORDER — GEMCITABINE HYDROCHLORIDE 38 MG/ML
2 INJECTION, SOLUTION INTRAVENOUS ONCE
Refills: 0 | Status: ACTIVE | OUTPATIENT
Start: 2025-08-21

## 2025-08-21 RX ORDER — GEMCITABINE 100 MG/ML
2 INJECTION, SOLUTION INTRAVENOUS
Qty: 1 | Refills: 0 | Status: COMPLETED | OUTPATIENT
Start: 2025-08-21 | End: 2025-08-21

## 2025-08-21 RX ORDER — AMLODIPINE BESYLATE 5 MG/1
5 TABLET ORAL
Refills: 0 | Status: ACTIVE | COMMUNITY

## 2025-08-22 DIAGNOSIS — C67.9 MALIGNANT NEOPLASM OF BLADDER, UNSPECIFIED: ICD-10-CM

## 2025-08-27 ENCOUNTER — OUTPATIENT (OUTPATIENT)
Dept: OUTPATIENT SERVICES | Facility: HOSPITAL | Age: 75
LOS: 1 days | End: 2025-08-27

## 2025-08-27 DIAGNOSIS — Z98.890 OTHER SPECIFIED POSTPROCEDURAL STATES: Chronic | ICD-10-CM

## 2025-08-27 DIAGNOSIS — Z96.0 PRESENCE OF UROGENITAL IMPLANTS: Chronic | ICD-10-CM

## 2025-08-27 DIAGNOSIS — R35.0 FREQUENCY OF MICTURITION: ICD-10-CM

## 2025-08-27 DIAGNOSIS — Z98.41 CATARACT EXTRACTION STATUS, RIGHT EYE: Chronic | ICD-10-CM

## 2025-08-28 ENCOUNTER — APPOINTMENT (OUTPATIENT)
Dept: UROLOGY | Facility: CLINIC | Age: 75
End: 2025-08-28
Payer: MEDICARE

## 2025-08-28 ENCOUNTER — OUTPATIENT (OUTPATIENT)
Dept: OUTPATIENT SERVICES | Facility: HOSPITAL | Age: 75
LOS: 1 days | End: 2025-08-28
Payer: MEDICARE

## 2025-08-28 VITALS
SYSTOLIC BLOOD PRESSURE: 116 MMHG | OXYGEN SATURATION: 99 % | RESPIRATION RATE: 15 BRPM | TEMPERATURE: 97.5 F | DIASTOLIC BLOOD PRESSURE: 78 MMHG | HEART RATE: 69 BPM

## 2025-08-28 DIAGNOSIS — Z98.890 OTHER SPECIFIED POSTPROCEDURAL STATES: Chronic | ICD-10-CM

## 2025-08-28 DIAGNOSIS — Z96.0 PRESENCE OF UROGENITAL IMPLANTS: Chronic | ICD-10-CM

## 2025-08-28 DIAGNOSIS — R35.0 FREQUENCY OF MICTURITION: ICD-10-CM

## 2025-08-28 DIAGNOSIS — Z90.5 ACQUIRED ABSENCE OF KIDNEY: Chronic | ICD-10-CM

## 2025-08-28 DIAGNOSIS — Z98.41 CATARACT EXTRACTION STATUS, RIGHT EYE: Chronic | ICD-10-CM

## 2025-08-28 PROCEDURE — 51720 TREATMENT OF BLADDER LESION: CPT

## 2025-08-28 RX ORDER — GEMCITABINE HYDROCHLORIDE 1 G/1
1 INJECTION, POWDER, LYOPHILIZED, FOR SOLUTION INTRAVENOUS
Qty: 1 | Refills: 0 | Status: DISCONTINUED | OUTPATIENT
Start: 2025-08-27 | End: 2025-08-28

## 2025-08-28 RX ORDER — GEMCITABINE HYDROCHLORIDE 38 MG/ML
2 INJECTION, SOLUTION INTRAVENOUS ONCE
Refills: 0 | Status: ACTIVE | OUTPATIENT
Start: 2025-08-28

## 2025-08-28 RX ORDER — GEMCITABINE 100 MG/ML
2 INJECTION, SOLUTION INTRAVENOUS
Qty: 1 | Refills: 0 | Status: COMPLETED | OUTPATIENT
Start: 2025-08-28 | End: 2025-08-28

## 2025-08-28 RX ORDER — GEMCITABINE 2 G/50ML
2 INJECTION, POWDER, LYOPHILIZED, FOR SOLUTION INTRAVENOUS
Qty: 1 | Refills: 0 | Status: COMPLETED | OUTPATIENT
Start: 2025-08-28

## 2025-08-28 RX ADMIN — GEMCITABINE HYDROCHLORIDE 0 GM: 1 INJECTION, POWDER, LYOPHILIZED, FOR SOLUTION INTRAVENOUS at 00:00

## 2025-08-29 DIAGNOSIS — C67.9 MALIGNANT NEOPLASM OF BLADDER, UNSPECIFIED: ICD-10-CM

## 2025-09-04 ENCOUNTER — APPOINTMENT (OUTPATIENT)
Dept: UROLOGY | Facility: CLINIC | Age: 75
End: 2025-09-04
Payer: MEDICARE

## 2025-09-04 ENCOUNTER — OUTPATIENT (OUTPATIENT)
Dept: OUTPATIENT SERVICES | Facility: HOSPITAL | Age: 75
LOS: 1 days | End: 2025-09-04
Payer: MEDICARE

## 2025-09-04 VITALS — DIASTOLIC BLOOD PRESSURE: 57 MMHG | HEART RATE: 77 BPM | SYSTOLIC BLOOD PRESSURE: 123 MMHG

## 2025-09-04 DIAGNOSIS — Z98.890 OTHER SPECIFIED POSTPROCEDURAL STATES: Chronic | ICD-10-CM

## 2025-09-04 DIAGNOSIS — Z96.0 PRESENCE OF UROGENITAL IMPLANTS: Chronic | ICD-10-CM

## 2025-09-04 DIAGNOSIS — R35.0 FREQUENCY OF MICTURITION: ICD-10-CM

## 2025-09-04 DIAGNOSIS — Z98.41 CATARACT EXTRACTION STATUS, RIGHT EYE: Chronic | ICD-10-CM

## 2025-09-04 DIAGNOSIS — Z90.5 ACQUIRED ABSENCE OF KIDNEY: Chronic | ICD-10-CM

## 2025-09-04 PROCEDURE — 51720 TREATMENT OF BLADDER LESION: CPT

## 2025-09-04 RX ORDER — GEMCITABINE HYDROCHLORIDE 2 G/52.6ML
2 INJECTION INTRAVENOUS
Refills: 0 | Status: COMPLETED | OUTPATIENT
Start: 2025-09-04

## 2025-09-04 RX ORDER — GEMCITABINE HYDROCHLORIDE 38 MG/ML
2 INJECTION, SOLUTION INTRAVENOUS ONCE
Refills: 0 | Status: ACTIVE | OUTPATIENT
Start: 2025-09-04

## 2025-09-04 RX ORDER — GEMCITABINE HYDROCHLORIDE 2 G/52.6ML
2 INJECTION INTRAVENOUS
Qty: 1 | Refills: 0 | Status: COMPLETED | OUTPATIENT
Start: 2025-09-04 | End: 2025-09-04

## 2025-09-04 RX ADMIN — GEMCITABINE HYDROCHLORIDE 0 GM/52.6ML: 1 INJECTION, POWDER, LYOPHILIZED, FOR SOLUTION INTRAVENOUS at 00:00

## 2025-09-05 DIAGNOSIS — C67.9 MALIGNANT NEOPLASM OF BLADDER, UNSPECIFIED: ICD-10-CM

## 2025-09-05 DIAGNOSIS — Z85.51 PERSONAL HISTORY OF MALIGNANT NEOPLASM OF BLADDER: ICD-10-CM

## 2025-09-11 ENCOUNTER — APPOINTMENT (OUTPATIENT)
Dept: UROLOGY | Facility: CLINIC | Age: 75
End: 2025-09-11
Payer: MEDICARE

## 2025-09-11 VITALS
OXYGEN SATURATION: 100 % | DIASTOLIC BLOOD PRESSURE: 70 MMHG | HEART RATE: 73 BPM | TEMPERATURE: 97.2 F | SYSTOLIC BLOOD PRESSURE: 120 MMHG

## 2025-09-11 DIAGNOSIS — N13.30 UNSPECIFIED HYDRONEPHROSIS: ICD-10-CM

## 2025-09-11 PROCEDURE — 51720 TREATMENT OF BLADDER LESION: CPT

## 2025-09-11 RX ORDER — GEMCITABINE 2 G/50ML
2 INJECTION, POWDER, LYOPHILIZED, FOR SOLUTION INTRAVENOUS
Qty: 1 | Refills: 0 | Status: COMPLETED | OUTPATIENT
Start: 2025-09-11

## 2025-09-11 RX ORDER — GEMCITABINE 100 MG/ML
2 INJECTION, SOLUTION INTRAVENOUS
Qty: 1 | Refills: 0 | Status: COMPLETED | OUTPATIENT
Start: 2025-09-11 | End: 2025-09-11

## 2025-09-11 RX ADMIN — GEMCITABINE HYDROCHLORIDE 0 GM: 1 INJECTION, POWDER, LYOPHILIZED, FOR SOLUTION INTRAVENOUS at 00:00

## 2025-09-17 DIAGNOSIS — N40.1 BENIGN PROSTATIC HYPERPLASIA WITH LOWER URINARY TRACT SYMPMS: ICD-10-CM

## 2025-09-17 DIAGNOSIS — N13.8 BENIGN PROSTATIC HYPERPLASIA WITH LOWER URINARY TRACT SYMPMS: ICD-10-CM

## 2025-09-17 RX ORDER — GEMCITABINE 100 MG/ML
2 INJECTION, SOLUTION INTRAVENOUS
Qty: 1 | Refills: 0 | Status: ACTIVE | OUTPATIENT
Start: 2025-09-17

## 2025-09-18 ENCOUNTER — APPOINTMENT (OUTPATIENT)
Dept: UROLOGY | Facility: CLINIC | Age: 75
End: 2025-09-18
Payer: MEDICARE

## 2025-09-18 PROCEDURE — 51720 TREATMENT OF BLADDER LESION: CPT

## 2025-09-18 RX ORDER — GEMCITABINE HYDROCHLORIDE 2 G/52.6ML
2 INJECTION INTRAVENOUS
Refills: 0 | Status: COMPLETED | OUTPATIENT
Start: 2025-09-18

## 2025-09-20 LAB
APPEARANCE: CLEAR
BACTERIA UR CULT: NORMAL
BACTERIA: NEGATIVE /HPF
BILIRUBIN URINE: NEGATIVE
BLOOD URINE: ABNORMAL
CAST: 1 /LPF
COLOR: YELLOW
EPITHELIAL CELLS: 1 /HPF
GLUCOSE QUALITATIVE U: NEGATIVE MG/DL
KETONES URINE: NEGATIVE MG/DL
LEUKOCYTE ESTERASE URINE: ABNORMAL
MICROSCOPIC-UA: NORMAL
NITRITE URINE: NEGATIVE
PH URINE: 6
PROTEIN URINE: 30 MG/DL
RED BLOOD CELLS URINE: 111 /HPF
REVIEW: NORMAL
SPECIFIC GRAVITY URINE: 1.02
URINE CYTOLOGY: NORMAL
UROBILINOGEN URINE: 0.2 MG/DL
WHITE BLOOD CELLS URINE: 2 /HPF

## (undated) DEVICE — POSITIONER FOAM HEADREST (PINK)

## (undated) DEVICE — ELCTR COLORADO 3CM

## (undated) DEVICE — SUT PLAIN GUT FAST ABSORBING 5-0 PC-1

## (undated) DEVICE — GLV 8.5 PROTEXIS (WHITE)

## (undated) DEVICE — POSITIONER FOAM EGG CRATE ULNAR 2PCS (PINK)

## (undated) DEVICE — GOWN TRIMAX LG

## (undated) DEVICE — WARMING BLANKET UPPER ADULT

## (undated) DEVICE — DRAINAGE BAG URINARY 2L

## (undated) DEVICE — DRAPE EQUIPMENT COVER 27"

## (undated) DEVICE — IRR BULB PATHFINDER + 10"

## (undated) DEVICE — SOL IRR POUR H2O 1500ML

## (undated) DEVICE — Device

## (undated) DEVICE — PREP BETADINE SPONGE STICKS

## (undated) DEVICE — FOLEY TRAY 16FR 5CC LTX UMETER CLOSED

## (undated) DEVICE — PACK CYSTO

## (undated) DEVICE — DRAPE 1/2 SHEET 40X57"

## (undated) DEVICE — TUBING RANGER FLUID IRRIGATION SET DISP

## (undated) DEVICE — FOLEY CATH 2-WAY 16FR 5CC LATEX LUBRICATH

## (undated) DEVICE — GLV 7.5 PROTEXIS (WHITE)

## (undated) DEVICE — SYR LUER LOK 10CC

## (undated) DEVICE — NSA-C-ARM: Type: DURABLE MEDICAL EQUIPMENT

## (undated) DEVICE — GLV 7 PROTEXIS (WHITE)

## (undated) DEVICE — TUBING SUCTION 20FT

## (undated) DEVICE — XI TIP COVER

## (undated) DEVICE — SOL IRR BAG H2O 3000ML

## (undated) DEVICE — MEDICATION LABELS W MARKER

## (undated) DEVICE — GLV 8 PROTEXIS (WHITE)

## (undated) DEVICE — DRSG SPANDAGE TUBULAR ELASTIC RETAINER NET SIZE 7

## (undated) DEVICE — SUT VLOC 180 2-0 6" GS-22 GREEN

## (undated) DEVICE — VENODYNE/SCD SLEEVE CALF LARGE

## (undated) DEVICE — FOLEY CATH 2-WAY 18FR 5CC LATEX HYDROGEL

## (undated) DEVICE — DRSG 4 X 4" 4PLY STERILE

## (undated) DEVICE — SOL IRR BAG NS 0.9% 3000ML

## (undated) DEVICE — DRAPE EQUIPMENT BANDED BAG 30 X 30" (SHOWER CAP)

## (undated) DEVICE — XI DRAPE ARM

## (undated) DEVICE — ACMI SELF-SEALING SEAL UP TO 7FR

## (undated) DEVICE — WARMING BLANKET FULL UNDERBODY

## (undated) DEVICE — CABLE DAC ACTIVE CORD

## (undated) DEVICE — OMNIPAQUE 300  30ML

## (undated) DEVICE — PACK GENERAL LAPAROSCOPY

## (undated) DEVICE — BAG URINE W METER 2L

## (undated) DEVICE — DRSG KERLIX ROLL 4.5"

## (undated) DEVICE — SPECIMEN CONTAINER 100ML

## (undated) DEVICE — VENODYNE/SCD SLEEVE CALF MEDIUM

## (undated) DEVICE — GLV 7 PROTEXIS (BLUE)

## (undated) DEVICE — GLV 6.5 PROTEXIS (WHITE)

## (undated) DEVICE — CATH FOLEY 2 WAY 16FR 5CC LATEX HYDROGEL

## (undated) DEVICE — FRAZIER SUCTION TIP 12FR

## (undated) DEVICE — XI OBTURATOR OPTICAL BLADELESS 8MM

## (undated) DEVICE — XI ARM NEEDLE DRIVER LARGE

## (undated) DEVICE — DRSG MASTISOL

## (undated) DEVICE — PACK MINOR

## (undated) DEVICE — D HELP - CLEARVIEW CLEARIFY SYSTEM

## (undated) DEVICE — DRAPE INSTRUMENT POUCH 6.75" X 11"

## (undated) DEVICE — PROTECTOR CORNEAL BLUE ADULT

## (undated) DEVICE — FOLEY HOLDER STATLOCK 2 WAY ADULT

## (undated) DEVICE — SUT SILK 4-0 18" P-3

## (undated) DEVICE — XI ARM SCISSOR MONO CURVED

## (undated) DEVICE — SUT MONOCRYL 5-0 18" P-3 UNDYED

## (undated) DEVICE — NDL HYPO SAFE 22G X 1.5" (BLACK)

## (undated) DEVICE — STAPLER SKIN VISI-STAT 35 WIDE

## (undated) DEVICE — ELCTR BOVIE TIP NEEDLE INSULATED 2.8" EDGE

## (undated) DEVICE — ELCTR CUTTING LOOP 24FR 12 DEG 0.35 WIRE

## (undated) DEVICE — XI DRAPE COLUMN

## (undated) DEVICE — XI SEAL UNIV 5- 8 MM

## (undated) DEVICE — ELCTR BOVIE TIP BLADE INSULATED 2.75" EDGE

## (undated) DEVICE — FOLEY CATH 3-WAY 20FR 5CC LATEX LUBRICATH

## (undated) DEVICE — DRILL BIT STRYKER CRANIOMAXILLOFACIAL 1.35X50

## (undated) DEVICE — TUBING STRYKER PNEUMOCLEAR SMOKE EVACUATION HIGH FLOW

## (undated) DEVICE — DRSG STERISTRIPS 0.5 X 4"